# Patient Record
Sex: MALE | Race: WHITE | NOT HISPANIC OR LATINO | Employment: OTHER | URBAN - NONMETROPOLITAN AREA
[De-identification: names, ages, dates, MRNs, and addresses within clinical notes are randomized per-mention and may not be internally consistent; named-entity substitution may affect disease eponyms.]

---

## 2017-06-07 DIAGNOSIS — M54.50 LOW BACK PAIN: Primary | ICD-10-CM

## 2017-06-08 ENCOUNTER — HOSPITAL ENCOUNTER (OUTPATIENT)
Dept: MRI IMAGING | Facility: HOSPITAL | Age: 67
Discharge: HOME | End: 2017-06-08
Attending: FAMILY MEDICINE | Admitting: FAMILY MEDICINE
Payer: MEDICARE

## 2017-06-08 PROCEDURE — 72148 MRI LUMBAR SPINE W/O DYE: CPT | Mod: TC

## 2017-06-19 ENCOUNTER — TRANSFERRED RECORDS (OUTPATIENT)
Dept: HEALTH INFORMATION MANAGEMENT | Facility: HOSPITAL | Age: 67
End: 2017-06-19

## 2017-06-19 DIAGNOSIS — M51.06 INTERVERTEBRAL LUMBAR DISC DISORDER WITH MYELOPATHY, LUMBAR REGION: Primary | ICD-10-CM

## 2017-06-23 ENCOUNTER — HOSPITAL ENCOUNTER (OUTPATIENT)
Dept: INTERVENTIONAL RADIOLOGY/VASCULAR | Facility: HOSPITAL | Age: 67
Discharge: HOME | End: 2017-06-23
Attending: FAMILY MEDICINE | Admitting: FAMILY MEDICINE
Payer: MEDICARE

## 2017-06-23 PROCEDURE — 64483 NJX AA&/STRD TFRM EPI L/S 1: CPT | Mod: TC

## 2017-06-23 PROCEDURE — 25000128 H RX IP 250 OP 636: Performed by: RADIOLOGY

## 2017-06-23 RX ORDER — IOPAMIDOL 612 MG/ML
15 INJECTION, SOLUTION INTRATHECAL ONCE
Status: COMPLETED | OUTPATIENT
Start: 2017-06-23 | End: 2017-06-23

## 2017-06-23 RX ORDER — DEXAMETHASONE SODIUM PHOSPHATE 10 MG/ML
INJECTION, SOLUTION INTRAMUSCULAR; INTRAVENOUS
Status: DISPENSED
Start: 2017-06-23 | End: 2017-06-23

## 2017-06-23 RX ORDER — DEXAMETHASONE SODIUM PHOSPHATE 10 MG/ML
10 INJECTION, SOLUTION INTRAMUSCULAR; INTRAVENOUS ONCE
Status: COMPLETED | OUTPATIENT
Start: 2017-06-23 | End: 2017-06-23

## 2017-06-23 RX ADMIN — DEXAMETHASONE SODIUM PHOSPHATE 10 MG: 10 INJECTION, SOLUTION INTRAMUSCULAR; INTRAVENOUS at 08:46

## 2017-06-23 RX ADMIN — IOPAMIDOL 2 ML: 612 INJECTION, SOLUTION INTRATHECAL at 08:47

## 2017-06-23 NOTE — IP AVS SNAPSHOT
MRN:[id number]                      After Visit Summary   6/23/2017    Ramo Lockwood    MRN: [id number]           Visit Information        Provider Department      6/23/2017  8:30 AM Radiologist, Need Interventional; [facility]           Review of your medicines      UNREVIEWED medicines. Ask your doctor about these medicines        Dose / Directions    furosemide 40 MG tablet   Commonly known as:  LASIX        Dose:  40 mg   Take 1 tablet (40 mg) by mouth daily   Quantity:  10 tablet   Refills:  0       nadolol 20 MG tablet   Commonly known as:  CORGARD   Used for:  Alcoholic cirrhosis of liver (H)        Dose:  20 mg   Take 1 tablet (20 mg) by mouth daily   Quantity:  30 tablet   Refills:  1       ranitidine 150 MG tablet   Commonly known as:  ZANTAC        Dose:  150 mg   Take 150 mg by mouth   Refills:  0       spironolactone 25 MG tablet   Commonly known as:  ALDACTONE   Used for:  Alcoholic cirrhosis of liver (H), Ascites, Portal hypertension (H)        Dose:  25 mg   Take 1 tablet (25 mg) by mouth daily   Quantity:  30 tablet   Refills:  1                Protect others around you: Learn how to safely use, store and throw away your medicines at www.disposemymeds.org.         Follow-ups after your visit         Care Instructions        Further instructions from your care team       Home number on file [phone] (home)  Is it ok to leave a message at this number(s)? Yes    Dr. Macias completed your epidural steroid injection lumbar procedure on 6/23/2017.    Current Pain Level (0-10 Scale): 7/10  Post Pain Level (0-10):  3/10    Radiology Discharge instructions for Steroid Injection    Activity Level:     Do not do any heavy activity or exercise for 24 hours.   Do not drive for 4 hours after your injection.  Diet:   Return to your normal diet.  Medications:   If you have stopped taking your Aspirin, Coumadin/Warfarin, Ibuprofen, or any   other blood  "thinner for this procedure you may resume in the morning unless   your primary care provider has given you other instructions.    Diabetics may see an increase in blood sugar after steroid injections. If you are concerned about your blood sugar, please contact your family doctor.    Site Care:  Remove the bandage and bathe or shower the morning after the procedure.      Please allow two weeks to experience improvement in your pain.  If you have any further issues, please contact your provider.    Call your Primary Care Provider if you have the following (if your primary care provider is not available please seek emergency care):   Nausea with vomiting   Severe headache   Drowsiness or confusion   Redness or drainage at the injection or puncture site   Temperature over 101 degrees F   Other concerns   Worsening back pain   Stiff neck       Additional Information About Your Visit        [facility] Information     [employer] lets you send messages to your doctor, view your test results, renew your prescriptions, schedule appointments and more. To sign up, go to [email or web address] . Click on \"Log in\" on the left side of the screen, which will take you to the Welcome page. Then click on \"Sign up Now\" on the right side of the page.     You will be asked to enter the access code listed below, as well as some personal information. Please follow the directions to create your username and password.     Your access code is: [email or web address]  Expires: 2017  8:51 AM     Your access code will  in 90 days. If you need help or a new code, please call your [facility] clinic or [phone].        Care EveryWhere ID     This is your Care EveryWhere ID. This could be used by other organizations to access your [facility] medical records  [id number]         Primary Care Provider Office Phone # Fax #    Aníbal Dave [phone]      Equal Access to Services     RYAN SCHMID AH: Madi Garcia, " wadesiraeda harpal, qaybta kaharris hodges, chuy nuhain hayaan evgenyhaydee sajigrayson laNadineaachristiano ah. So [facility] [phone].    ATENCIÓN: Si geraldo corado, tiene a leger disposición servicios gratuitos de asistencia lingüística. Zenia al [phone].    We comply with applicable federal civil rights laws and [place] laws. We do not discriminate on the basis of race, color, national origin, age, disability sex, sexual orientation or gender identity.            Thank you!     Thank you for choosing [facility] for your care. Our goal is always to provide you with excellent care. Hearing back from our patients is one way we can continue to improve our services. Please take a few minutes to complete the written survey that you may receive in the mail after you visit with us. Thank you!             Medication List: This is a list of all your medications and when to take them. Check marks below indicate your daily home schedule. Keep this list as a reference.      Medications           Morning Afternoon Evening Bedtime As Needed    furosemide 40 MG tablet   Commonly known as:  LASIX   Take 1 tablet (40 mg) by mouth daily                                nadolol 20 MG tablet   Commonly known as:  CORGARD   Take 1 tablet (20 mg) by mouth daily                                ranitidine 150 MG tablet   Commonly known as:  ZANTAC   Take 150 mg by mouth                                spironolactone 25 MG tablet   Commonly known as:  ALDACTONE   Take 1 tablet (25 mg) by mouth daily

## 2017-06-23 NOTE — IP AVS SNAPSHOT
[facility]    [address] [place] [address]    Phone:  [phone]    Fax:  [phone]                                       After Visit Summary   6/23/2017    Ramo Lockwood    MRN: [id number]           After Visit Summary Signature Page     I have received my discharge instructions, and my questions have been answered. I have discussed any challenges I see with this plan with the nurse or doctor.    ..........................................................................................................................................  Patient/Patient Representative Signature      ..........................................................................................................................................  Patient Representative Print Name and Relationship to Patient    ..................................................               ................................................  Date                                            Time    ..........................................................................................................................................  Reviewed by Signature/Title    ...................................................              ..............................................  Date                                                            Time

## 2017-08-04 ENCOUNTER — TRANSFERRED RECORDS (OUTPATIENT)
Dept: MULTI SPECIALTY CLINIC | Facility: CLINIC | Age: 67
End: 2017-08-04

## 2017-08-04 LAB — COLONOSCOPY: NORMAL

## 2017-09-01 ENCOUNTER — HOSPITAL ENCOUNTER (EMERGENCY)
Facility: HOSPITAL | Age: 67
Discharge: HOME | End: 2017-09-01
Attending: NURSE PRACTITIONER | Admitting: NURSE PRACTITIONER
Payer: MEDICARE

## 2017-09-01 VITALS
TEMPERATURE: 96.9 F | DIASTOLIC BLOOD PRESSURE: 79 MMHG | RESPIRATION RATE: 16 BRPM | SYSTOLIC BLOOD PRESSURE: 120 MMHG | HEART RATE: 68 BPM | OXYGEN SATURATION: 97 %

## 2017-09-01 DIAGNOSIS — S61.216A LACERATION OF RIGHT LITTLE FINGER WITHOUT FOREIGN BODY WITHOUT DAMAGE TO NAIL, INITIAL ENCOUNTER: ICD-10-CM

## 2017-09-01 PROCEDURE — 40000268 ZZH STATISTIC NO CHARGES

## 2017-09-01 PROCEDURE — 12001 RPR S/N/AX/GEN/TRNK 2.5CM/<: CPT

## 2017-09-01 PROCEDURE — 12001 RPR S/N/AX/GEN/TRNK 2.5CM/<: CPT | Performed by: NURSE PRACTITIONER

## 2017-09-01 ASSESSMENT — ENCOUNTER SYMPTOMS
CHILLS: 0
WOUND: 1
FATIGUE: 0
APPETITE CHANGE: 0
ARTHRALGIAS: 0
FEVER: 0

## 2017-09-01 NOTE — ED AVS SNAPSHOT
[facility]    [address] [place] [address]    Phone:  [phone]                                       Ramo Lockwood   MRN: [id number]    Department:  [facility]   Date of Visit:  9/1/2017           After Visit Summary Signature Page     I have received my discharge instructions, and my questions have been answered. I have discussed any challenges I see with this plan with the nurse or doctor.    ..........................................................................................................................................  Patient/Patient Representative Signature      ..........................................................................................................................................  Patient Representative Print Name and Relationship to Patient    ..................................................               ................................................  Date                                            Time    ..........................................................................................................................................  Reviewed by Signature/Title    ...................................................              ..............................................  Date                                                            Time

## 2017-09-01 NOTE — ED NOTES
Pt presents today alone for c/o a laceration to his right pinky finger in some brush/grass, incident happened just prior to arrival.

## 2017-09-01 NOTE — ED PROVIDER NOTES
History     Chief Complaint   Patient presents with     Laceration     right anterior distal pinky finger cut on brush     HPI  Ramo Lockwood is a 67 year old male who presents today with a CC of right little finger laceration over the palmar surface of the distal phalanx.  The laceration happened about 1 hour PTA.  He was cutting brush and caught it on some brush in the woods.  Pain is minimal.  The bleeding is stopped.  He has full ROM of joints in the finger.  He denies any other injuries.  Tetanus is up to date as of 10/2012.      I have reviewed the Medications, Allergies, Past Medical and Surgical History, and Social History in the Epic system.    Allergies:   Allergies   Allergen Reactions     Codeine Camsylate          No current facility-administered medications on file prior to encounter.   Current Outpatient Prescriptions on File Prior to Encounter:  nadolol (CORGARD) 20 MG tablet Take 1 tablet (20 mg) by mouth daily   ranitidine (ZANTAC) 150 MG tablet Take 150 mg by mouth       Patient Active Problem List   Diagnosis     Epistaxis     Acute maxillary sinusitis     Nondependent alcohol abuse, continuous drinking behavior     Degeneration of lumbar or lumbosacral intervertebral disc     Type II diabetes mellitus (H)     Gout     Hyperlipidemia     Neoplasm of uncertain behavior of lip, oral cavity, and pharynx     Anemia     Essential hypertension     GI bleed     Alcoholic cirrhosis of liver (H)     Ascites     Chronic hyponatremia     Thrombocytopenia (H)     Elevated INR     Epileptic petit mal status (H)     Diarrhea     Seizure (H)     C. difficile colitis     Hyponatremia       Past Surgical History:   Procedure Laterality Date     COLONOSCOPY       PHACOEMULSIFICATION WITH STANDARD INTRAOCULAR LENS IMPLANT  6/10/2013    Procedure: PHACOEMULSIFICATION WITH STANDARD INTRAOCULAR LENS IMPLANT;  CATARACT EXTRACTION WITH INTRA OCLULAR LENS LEFT;  Surgeon: Benoit Philip MD;  Location: [facility] OR      "PHACOEMULSIFICATION WITH STANDARD INTRAOCULAR LENS IMPLANT  7/23/2013    Procedure: PHACOEMULSIFICATION WITH STANDARD INTRAOCULAR LENS IMPLANT;  CATARACT EXTRACTION WITH INTRA OCULAR LENS RIGHT;  Surgeon: Benoit Philip MD;  Location: [facility]       Social History   Substance Use Topics     Smoking status: Never Smoker     Smokeless tobacco: Never Used     Alcohol use No      Comment: H/O abuse and treatment, sober for 2 yrs.        Most Recent Immunizations   Administered Date(s) Administered     Influenza (IIV3) 12/12/2012     TD (ADULT, 7+) 08/22/2000     TDAP Vaccine (Boostrix) 10/16/2012       BMI: Estimated body mass index is 25.37 kg/(m^2) as calculated from the following:    Height as of 6/5/15: 1.778 m (5' 10\").    Weight as of 6/7/15: 80.2 kg (176 lb 12.9 oz).      Review of Systems   Constitutional: Negative for appetite change, chills, fatigue and fever.   Musculoskeletal: Negative for arthralgias.   Skin: Positive for wound.       Physical Exam   BP: 120/79  Pulse: 68  Temp: 96.9  F (36.1  C)  Resp: 16  SpO2: 97 %    Physical Exam   Constitutional: He is oriented to person, place, and time. He appears well-developed and well-nourished. He is cooperative. He does not appear ill. No distress.   Cardiovascular: Normal rate.    Pulmonary/Chest: Effort normal.   Musculoskeletal:        Right hand: He exhibits laceration (palmar surface, distal phalanx, little finger 1.5 cm, into subcutaneous tissue). He exhibits normal range of motion and no bony tenderness. Normal sensation noted. Normal strength noted.   Neurological: He is alert and oriented to person, place, and time.   Psychiatric: He has a normal mood and affect. His behavior is normal.   Nursing note and vitals reviewed.      ED Course     ED Course     Laceration repair  Date/Time: 9/2/2017 12:29 PM  Performed by: TRUE SUMMERS  Authorized by: TRUE SUMMERS   Consent: Verbal consent obtained. Written consent not obtained.  Risks and " "benefits: risks, benefits and alternatives were discussed  Consent given by: patient  Patient understanding: patient states understanding of the procedure being performed  Body area: upper extremity  Location details: right small finger  Laceration length: 1.5 cm  Foreign bodies: no foreign bodies  Tendon involvement: none  Anesthesia: local infiltration    Anesthesia:  Local Anesthetic: lidocaine 2% without epinephrine  Anesthetic total: 1.5 mL    Sedation:  Patient sedated: no  Preparation: Patient was prepped and draped in the usual sterile fashion.  Irrigation solution: saline  Irrigation method: syringe  Amount of cleaning: standard  Debridement: minimal  Degree of undermining: none  Skin closure: 5-0 nylon  Number of sutures: 7  Technique: simple  Approximation: close  Approximation difficulty: simple  Dressing: tube gauze  Patient tolerance: Patient tolerated the procedure well with no immediate complications        Assessments & Plan (with Medical Decision Making)     I have reviewed the nursing notes.    I have reviewed the findings, diagnosis, plan and need for follow up with the patient.  ASSESSMENT / PLAN:  (S61.216A) Laceration of right little finger without foreign body without damage to nail, initial encounter  Comment: closed with 7 sutures  Plan:  Keep clean and dry x 24 hours, then may shower as usual. Do not soak or swim   Take Tylenol per package directions for pain   Call tomorrow to make a follow up appointment for suture removal in 7 days   Watch for signs and symptoms of infection: Increasing redness, pain, warmth, fever, chills, malodor, increased drainage, and follow up here or with PCP if any arise   Patient verbally educated and discharge instructions given, verbalizes understanding, and all questions answered to the best of my ability.   Return to ED/UC if symptoms increase or concerns develop such as those discussed and listed on the \"When to go the Emergency Room\" portion of your " discharge instructions.       Discharge Medication List as of 9/1/2017 12:14 PM          Final diagnoses:   Laceration of right little finger without foreign body without damage to nail, initial encounter - closed with 7 sutures       9/1/2017   [facility]     Holli Wan NP  09/02/17 1238

## 2017-09-01 NOTE — ED AVS SNAPSHOT
[facility]    [address] [address]    Phone:  [phone]                                       Ramo Lockwood   MRN: [id number]    Department:  [facility]   Date of Visit:  9/1/2017           Patient Information     Date Of Birth          1950        Your diagnoses for this visit were:     Laceration of right little finger without foreign body without damage to nail, initial encounter closed with 7 sutures       You were seen by Holli Wan NP.      Follow-up Information     Follow up with [facility].    Specialty:  EMERGENCY MEDICINE    Why:  As needed, If symptoms worsen, or concerns develop    Contact information:    [address] [address] [phone]    Additional information:    From [facility]: Take [address]. Turn left at [address]. Turn left at the first stoplight on [address]. At the first stop sign, take a right onto [address]. Take a left into the parking lot and continue through until you reach the North enterance of the building.       From [place]: Take [address]. Take the [address] ramp towards [place]. Turn left onto [address]. Take a slight right onto [address]. Turn left at the first stoplight on [address]. At the first stop sign, take a right onto [address]. Take a left into the parking lot and continue through until you reach the North enterance of the building.       From [place]: Take [address]. Take a right at [address]. At the first stop sign, take a right onto [address]. Take a left into the parking lot and continue through until you reach the North enterance of the building.         Follow up with Aníbal Dave MD In 1 week.    Specialty:  Family Practice    Why:  For suture removal    Contact information:    [facility]  [address] [facility] [address]  [phone]           Discharge Instructions         Extremity Laceration: Sutures, Staples, or Tape  A laceration is a cut through the skin. If it is deep, it may require stitches (sutures) or staples to close so it can heal. Minor cuts may be treated with surgical tape closures.   X-rays may be done if something may have entered the skin through the cut. You may also need a tetanus shot if you are not up to date on this vaccination.  Home care    Follow the health care provider s instructions on how to care for the cut.    Wash your hands with soap and warm water before and after caring for your wound. This is to help prevent infection.    Keep the wound clean and dry. If a bandage was applied and it becomes wet or dirty, replace it. Otherwise, leave it in place for the first 24 hours, then change it once a day or as directed.    If sutures or staples were used, clean the wound daily:    After removing the bandage, wash the area with soap and water. Use a wet cotton swab to loosen and remove any blood or crust that forms.    After cleaning, keep the wound clean and dry. Talk with your doctor before applying any antibiotic ointment to the wound. Reapply the bandage.    You may remove the bandage to shower as usual after the first 24 hours, but do not soak the area in water (no swimming) until the stitches or staples are removed.    If surgical tape closures were used, keep the area clean and dry. If it becomes wet, blot it dry with a towel.    The doctor may prescribe an antibiotic cream or ointment to prevent infection. Do not stop taking this medication until you have finished the prescribed course or the doctor tells you to stop. The doctor may also prescribe medications for pain. Follow the doctor s instructions for taking these medications.    Avoid activities that may reopen your wound.  Follow-up care  Follow up with your health care provider. Most skin wounds heal within ten days. However, an infection may sometimes occur  despite proper treatment. Therefore, check the wound daily for the signs of infection listed below. Stitches and staples should be removed within 7-14 days. If surgical tape closures were used, you may remove them after 10 days if they have not fallen off by then.   When to seek medical advice  Call your health care provider right away if any of these occur:    Wound bleeding not controlled by direct pressure    Signs of infection, including increasing pain in the wound, increasing wound redness or swelling, or pus or bad odor coming from the wound    Fever of 100.4 F (38 C) or higher or as directed by your healthcare provider    Stitches or staples come apart or fall out or surgical tape falls off before 7 days    Wound edges re-open    Wound changes colors    Numbness around the wound     Decreased movement around the injured area  Date Last Reviewed: 6/14/2015 2000-2017 [employer]. [address] [place] [address]. All rights reserved. This information is not intended as a substitute for professional medical care. Always follow your healthcare professional's instructions.             Review of your medicines      Our records show that you are taking the medicines listed below. If these are incorrect, please call your family doctor or clinic.        Dose / Directions Last dose taken    nadolol 20 MG tablet   Commonly known as:  CORGARD   Dose:  20 mg   Quantity:  30 tablet        Take 1 tablet (20 mg) by mouth daily   Refills:  1        ranitidine 150 MG tablet   Commonly known as:  ZANTAC   Dose:  150 mg        Take 150 mg by mouth   Refills:  0                Orders Needing Specimen Collection     None      Pending Results     No orders found from 8/30/2017 to 9/2/2017.            Pending Culture Results     No orders found from 8/30/2017 to 9/2/2017.            Thank you for choosing [facility]       Thank you for choosing [facility] for your care. Our goal is always to provide you with  "excellent care. Hearing back from our patients is one way we can continue to improve our services. Please take a few minutes to complete the written survey that you may receive in the mail after you visit with us. Thank you!        [employer] Information     [employer] lets you send messages to your doctor, view your test results, renew your prescriptions, schedule appointments and more. To sign up, go to [email or web address] . Click on \"Log in\" on the left side of the screen, which will take you to the Welcome page. Then click on \"Sign up Now\" on the right side of the page.     You will be asked to enter the access code listed below, as well as some personal information. Please follow the directions to create your username and password.     Your access code is: [email or web address]  Expires: 2017  8:51 AM     Your access code will  in 90 days. If you need help or a new code, please call your [facility] clinic or [phone].        Care EveryWhere ID     This is your Care EveryWhere ID. This could be used by other organizations to access your [facility] medical records  [id number]        Equal Access to Services     ALFONSO SCHMID : Hadii pete Garcia, wachun rowan, qamaite kaalmawoody hodges, chuy cox . So [facility] [phone].    ATENCIÓN: Si habla español, tiene a leger disposición servicios gratuitos de asistencia lingüística. Llame al [phone].    We comply with applicable federal civil rights laws and [place] laws. We do not discriminate on the basis of race, color, national origin, age, disability sex, sexual orientation or gender identity.            After Visit Summary       This is your record. Keep this with you and show to your community pharmacist(s) and doctor(s) at your next visit.                  "

## 2017-09-01 NOTE — DISCHARGE INSTRUCTIONS
Extremity Laceration: Sutures, Staples, or Tape  A laceration is a cut through the skin. If it is deep, it may require stitches (sutures) or staples to close so it can heal. Minor cuts may be treated with surgical tape closures.   X-rays may be done if something may have entered the skin through the cut. You may also need a tetanus shot if you are not up to date on this vaccination.  Home care    Follow the health care provider s instructions on how to care for the cut.    Wash your hands with soap and warm water before and after caring for your wound. This is to help prevent infection.    Keep the wound clean and dry. If a bandage was applied and it becomes wet or dirty, replace it. Otherwise, leave it in place for the first 24 hours, then change it once a day or as directed.    If sutures or staples were used, clean the wound daily:    After removing the bandage, wash the area with soap and water. Use a wet cotton swab to loosen and remove any blood or crust that forms.    After cleaning, keep the wound clean and dry. Talk with your doctor before applying any antibiotic ointment to the wound. Reapply the bandage.    You may remove the bandage to shower as usual after the first 24 hours, but do not soak the area in water (no swimming) until the stitches or staples are removed.    If surgical tape closures were used, keep the area clean and dry. If it becomes wet, blot it dry with a towel.    The doctor may prescribe an antibiotic cream or ointment to prevent infection. Do not stop taking this medication until you have finished the prescribed course or the doctor tells you to stop. The doctor may also prescribe medications for pain. Follow the doctor s instructions for taking these medications.    Avoid activities that may reopen your wound.  Follow-up care  Follow up with your health care provider. Most skin wounds heal within ten days. However, an infection may sometimes occur despite proper treatment.  Therefore, check the wound daily for the signs of infection listed below. Stitches and staples should be removed within 7-14 days. If surgical tape closures were used, you may remove them after 10 days if they have not fallen off by then.   When to seek medical advice  Call your health care provider right away if any of these occur:    Wound bleeding not controlled by direct pressure    Signs of infection, including increasing pain in the wound, increasing wound redness or swelling, or pus or bad odor coming from the wound    Fever of 100.4 F (38 C) or higher or as directed by your healthcare provider    Stitches or staples come apart or fall out or surgical tape falls off before 7 days    Wound edges re-open    Wound changes colors    Numbness around the wound     Decreased movement around the injured area  Date Last Reviewed: 6/14/2015 2000-2017 [employer]. [address] [place] [id number]. All rights reserved. This information is not intended as a substitute for professional medical care. Always follow your healthcare professional's instructions.

## 2018-01-15 ENCOUNTER — HOSPITAL ENCOUNTER (OUTPATIENT)
Facility: HOSPITAL | Age: 68
Setting detail: OBSERVATION
Discharge: HOME | End: 2018-01-16
Attending: PHYSICIAN ASSISTANT | Admitting: INTERNAL MEDICINE
Payer: MEDICARE

## 2018-01-15 ENCOUNTER — APPOINTMENT (OUTPATIENT)
Dept: GENERAL RADIOLOGY | Facility: HOSPITAL | Age: 68
End: 2018-01-15
Attending: PHYSICIAN ASSISTANT
Payer: MEDICARE

## 2018-01-15 DIAGNOSIS — K70.31 ALCOHOLIC CIRRHOSIS OF LIVER WITH ASCITES (H): Primary | Chronic | ICD-10-CM

## 2018-01-15 DIAGNOSIS — J10.1 INFLUENZA A: ICD-10-CM

## 2018-01-15 DIAGNOSIS — I10 ESSENTIAL HYPERTENSION: Chronic | ICD-10-CM

## 2018-01-15 DIAGNOSIS — N17.9 ACUTE KIDNEY INJURY SUPERIMPOSED ON CHRONIC KIDNEY DISEASE (H): ICD-10-CM

## 2018-01-15 DIAGNOSIS — N18.9 ACUTE KIDNEY INJURY SUPERIMPOSED ON CHRONIC KIDNEY DISEASE (H): ICD-10-CM

## 2018-01-15 DIAGNOSIS — M62.81 GENERALIZED MUSCLE WEAKNESS: ICD-10-CM

## 2018-01-15 LAB
ALBUMIN SERPL-MCNC: 2.4 G/DL (ref 3.4–5)
ALP SERPL-CCNC: 189 U/L (ref 40–150)
ALT SERPL W P-5'-P-CCNC: 23 U/L (ref 0–70)
ANION GAP SERPL CALCULATED.3IONS-SCNC: 10 MMOL/L (ref 3–14)
AST SERPL W P-5'-P-CCNC: 49 U/L (ref 0–45)
BASOPHILS # BLD AUTO: 0 10E9/L (ref 0–0.2)
BASOPHILS NFR BLD AUTO: 0 %
BILIRUB SERPL-MCNC: 2.6 MG/DL (ref 0.2–1.3)
BUN SERPL-MCNC: 13 MG/DL (ref 7–30)
CALCIUM SERPL-MCNC: 7.8 MG/DL (ref 8.5–10.1)
CHLORIDE SERPL-SCNC: 104 MMOL/L (ref 94–109)
CO2 SERPL-SCNC: 22 MMOL/L (ref 20–32)
CREAT SERPL-MCNC: 1.44 MG/DL (ref 0.66–1.25)
DIFFERENTIAL METHOD BLD: ABNORMAL
EOSINOPHIL # BLD AUTO: 0.2 10E9/L (ref 0–0.7)
EOSINOPHIL NFR BLD AUTO: 2 %
ERYTHROCYTE [DISTWIDTH] IN BLOOD BY AUTOMATED COUNT: 15.8 % (ref 10–15)
GFR SERPL CREATININE-BSD FRML MDRD: 49 ML/MIN/1.7M2
GLUCOSE SERPL-MCNC: 106 MG/DL (ref 70–99)
HCT VFR BLD AUTO: 36.1 % (ref 40–53)
HGB BLD-MCNC: 12.5 G/DL (ref 13.3–17.7)
LACTATE SERPL-SCNC: 2 MMOL/L (ref 0.4–2)
LYMPHOCYTES # BLD AUTO: 0.1 10E9/L (ref 0.8–5.3)
LYMPHOCYTES NFR BLD AUTO: 1 %
MCH RBC QN AUTO: 32.1 PG (ref 26.5–33)
MCHC RBC AUTO-ENTMCNC: 34.6 G/DL (ref 31.5–36.5)
MCV RBC AUTO: 93 FL (ref 78–100)
MONOCYTES # BLD AUTO: 0.7 10E9/L (ref 0–1.3)
MONOCYTES NFR BLD AUTO: 6 %
NEUTROPHILS # BLD AUTO: 10.9 10E9/L (ref 1.6–8.3)
NEUTROPHILS NFR BLD AUTO: 91 %
PLATELET # BLD AUTO: 57 10E9/L (ref 150–450)
POTASSIUM SERPL-SCNC: 4 MMOL/L (ref 3.4–5.3)
PROT SERPL-MCNC: 6.9 G/DL (ref 6.8–8.8)
RBC # BLD AUTO: 3.89 10E12/L (ref 4.4–5.9)
SODIUM SERPL-SCNC: 136 MMOL/L (ref 133–144)
WBC # BLD AUTO: 12 10E9/L (ref 4–11)

## 2018-01-15 PROCEDURE — G0378 HOSPITAL OBSERVATION PER HR: HCPCS

## 2018-01-15 PROCEDURE — 87040 BLOOD CULTURE FOR BACTERIA: CPT | Mod: 91 | Performed by: PHYSICIAN ASSISTANT

## 2018-01-15 PROCEDURE — 80053 COMPREHEN METABOLIC PANEL: CPT | Performed by: PHYSICIAN ASSISTANT

## 2018-01-15 PROCEDURE — 71046 X-RAY EXAM CHEST 2 VIEWS: CPT | Mod: TC

## 2018-01-15 PROCEDURE — 36415 COLL VENOUS BLD VENIPUNCTURE: CPT | Performed by: PHYSICIAN ASSISTANT

## 2018-01-15 PROCEDURE — A9270 NON-COVERED ITEM OR SERVICE: HCPCS | Mod: GY | Performed by: INTERNAL MEDICINE

## 2018-01-15 PROCEDURE — 25000132 ZZH RX MED GY IP 250 OP 250 PS 637: Mod: GY | Performed by: PHYSICIAN ASSISTANT

## 2018-01-15 PROCEDURE — 25000125 ZZHC RX 250: Performed by: PHYSICIAN ASSISTANT

## 2018-01-15 PROCEDURE — 99284 EMERGENCY DEPT VISIT MOD MDM: CPT | Performed by: PHYSICIAN ASSISTANT

## 2018-01-15 PROCEDURE — 94640 AIRWAY INHALATION TREATMENT: CPT

## 2018-01-15 PROCEDURE — 99285 EMERGENCY DEPT VISIT HI MDM: CPT | Mod: 25

## 2018-01-15 PROCEDURE — 99219 ZZC INITIAL OBSERVATION CARE,LEVL II: CPT | Performed by: INTERNAL MEDICINE

## 2018-01-15 PROCEDURE — 85025 COMPLETE CBC W/AUTO DIFF WBC: CPT | Performed by: PHYSICIAN ASSISTANT

## 2018-01-15 PROCEDURE — 96360 HYDRATION IV INFUSION INIT: CPT

## 2018-01-15 PROCEDURE — 25000128 H RX IP 250 OP 636: Performed by: PHYSICIAN ASSISTANT

## 2018-01-15 PROCEDURE — 83605 ASSAY OF LACTIC ACID: CPT | Performed by: PHYSICIAN ASSISTANT

## 2018-01-15 PROCEDURE — 25000132 ZZH RX MED GY IP 250 OP 250 PS 637: Mod: GY | Performed by: INTERNAL MEDICINE

## 2018-01-15 PROCEDURE — 40000786 ZZHCL STATISTIC ACTIVE MRSA SURVEILLANCE CULTURE: Performed by: INTERNAL MEDICINE

## 2018-01-15 PROCEDURE — 96361 HYDRATE IV INFUSION ADD-ON: CPT

## 2018-01-15 PROCEDURE — A9270 NON-COVERED ITEM OR SERVICE: HCPCS | Mod: GY | Performed by: PHYSICIAN ASSISTANT

## 2018-01-15 RX ORDER — NALOXONE HYDROCHLORIDE 0.4 MG/ML
.1-.4 INJECTION, SOLUTION INTRAMUSCULAR; INTRAVENOUS; SUBCUTANEOUS
Status: DISCONTINUED | OUTPATIENT
Start: 2018-01-15 | End: 2018-01-16 | Stop reason: HOSPADM

## 2018-01-15 RX ORDER — SODIUM CHLORIDE 9 MG/ML
1000 INJECTION, SOLUTION INTRAVENOUS CONTINUOUS
Status: DISCONTINUED | OUTPATIENT
Start: 2018-01-15 | End: 2018-01-15

## 2018-01-15 RX ORDER — ACETAMINOPHEN 650 MG/1
650 SUPPOSITORY RECTAL EVERY 4 HOURS PRN
Status: DISCONTINUED | OUTPATIENT
Start: 2018-01-15 | End: 2018-01-16 | Stop reason: HOSPADM

## 2018-01-15 RX ORDER — PROCHLORPERAZINE MALEATE 5 MG
5 TABLET ORAL EVERY 6 HOURS PRN
Status: DISCONTINUED | OUTPATIENT
Start: 2018-01-15 | End: 2018-01-16 | Stop reason: HOSPADM

## 2018-01-15 RX ORDER — IPRATROPIUM BROMIDE AND ALBUTEROL SULFATE 2.5; .5 MG/3ML; MG/3ML
3 SOLUTION RESPIRATORY (INHALATION) ONCE
Status: COMPLETED | OUTPATIENT
Start: 2018-01-15 | End: 2018-01-15

## 2018-01-15 RX ORDER — SODIUM CHLORIDE 9 MG/ML
1000 INJECTION, SOLUTION INTRAVENOUS CONTINUOUS
Status: DISCONTINUED | OUTPATIENT
Start: 2018-01-15 | End: 2018-01-16 | Stop reason: HOSPADM

## 2018-01-15 RX ORDER — NADOLOL 20 MG/1
20 TABLET ORAL EVERY EVENING
Status: DISCONTINUED | OUTPATIENT
Start: 2018-01-15 | End: 2018-01-16 | Stop reason: HOSPADM

## 2018-01-15 RX ORDER — ONDANSETRON 2 MG/ML
4 INJECTION INTRAMUSCULAR; INTRAVENOUS EVERY 6 HOURS PRN
Status: DISCONTINUED | OUTPATIENT
Start: 2018-01-15 | End: 2018-01-16 | Stop reason: HOSPADM

## 2018-01-15 RX ORDER — BENZONATATE 100 MG/1
200 CAPSULE ORAL 3 TIMES DAILY PRN
Status: DISCONTINUED | OUTPATIENT
Start: 2018-01-15 | End: 2018-01-15

## 2018-01-15 RX ORDER — DIPHENHYDRAMINE HCL 25 MG
25 CAPSULE ORAL EVERY 6 HOURS PRN
Status: DISCONTINUED | OUTPATIENT
Start: 2018-01-15 | End: 2018-01-16 | Stop reason: HOSPADM

## 2018-01-15 RX ORDER — OSELTAMIVIR PHOSPHATE 75 MG/1
75 CAPSULE ORAL 2 TIMES DAILY
Status: DISCONTINUED | OUTPATIENT
Start: 2018-01-15 | End: 2018-01-16 | Stop reason: HOSPADM

## 2018-01-15 RX ORDER — LEVOFLOXACIN 500 MG/1
500 TABLET, FILM COATED ORAL DAILY
Status: DISCONTINUED | OUTPATIENT
Start: 2018-01-16 | End: 2018-01-16 | Stop reason: HOSPADM

## 2018-01-15 RX ORDER — BENZONATATE 100 MG/1
200 CAPSULE ORAL EVERY 4 HOURS PRN
Status: DISCONTINUED | OUTPATIENT
Start: 2018-01-15 | End: 2018-01-15

## 2018-01-15 RX ORDER — ONDANSETRON 4 MG/1
4 TABLET, ORALLY DISINTEGRATING ORAL EVERY 6 HOURS PRN
Status: DISCONTINUED | OUTPATIENT
Start: 2018-01-15 | End: 2018-01-16 | Stop reason: HOSPADM

## 2018-01-15 RX ORDER — ACETAMINOPHEN 325 MG/1
650 TABLET ORAL EVERY 4 HOURS PRN
Status: DISCONTINUED | OUTPATIENT
Start: 2018-01-15 | End: 2018-01-16 | Stop reason: HOSPADM

## 2018-01-15 RX ORDER — GUAIFENESIN/DEXTROMETHORPHAN 100-10MG/5
5 SYRUP ORAL EVERY 4 HOURS PRN
Status: DISCONTINUED | OUTPATIENT
Start: 2018-01-15 | End: 2018-01-16 | Stop reason: HOSPADM

## 2018-01-15 RX ORDER — LIDOCAINE 40 MG/G
CREAM TOPICAL
Status: DISCONTINUED | OUTPATIENT
Start: 2018-01-15 | End: 2018-01-16 | Stop reason: HOSPADM

## 2018-01-15 RX ORDER — PROCHLORPERAZINE 25 MG
12.5 SUPPOSITORY, RECTAL RECTAL EVERY 12 HOURS PRN
Status: DISCONTINUED | OUTPATIENT
Start: 2018-01-15 | End: 2018-01-16 | Stop reason: HOSPADM

## 2018-01-15 RX ORDER — LEVOFLOXACIN 500 MG/1
500 TABLET, FILM COATED ORAL ONCE
Status: COMPLETED | OUTPATIENT
Start: 2018-01-15 | End: 2018-01-15

## 2018-01-15 RX ADMIN — LEVOFLOXACIN 500 MG: 500 TABLET, FILM COATED ORAL at 18:52

## 2018-01-15 RX ADMIN — SODIUM CHLORIDE 1000 ML: 9 INJECTION, SOLUTION INTRAVENOUS at 18:55

## 2018-01-15 RX ADMIN — ACETAMINOPHEN 650 MG: 325 TABLET ORAL at 21:24

## 2018-01-15 RX ADMIN — IPRATROPIUM BROMIDE AND ALBUTEROL SULFATE 3 ML: .5; 3 SOLUTION RESPIRATORY (INHALATION) at 16:55

## 2018-01-15 RX ADMIN — SODIUM CHLORIDE 1000 ML: 9 INJECTION, SOLUTION INTRAVENOUS at 17:20

## 2018-01-15 RX ADMIN — NADOLOL 20 MG: 20 TABLET ORAL at 21:28

## 2018-01-15 RX ADMIN — BENZONATATE 200 MG: 100 CAPSULE, LIQUID FILLED ORAL at 17:38

## 2018-01-15 RX ADMIN — GUAIFENESIN AND DEXTROMETHORPHAN 5 ML: 100; 10 SYRUP ORAL at 21:24

## 2018-01-15 RX ADMIN — OSELTAMIVIR PHOSPHATE 75 MG: 75 CAPSULE ORAL at 21:24

## 2018-01-15 RX ADMIN — DIPHENHYDRAMINE HYDROCHLORIDE 25 MG: 25 CAPSULE ORAL at 21:24

## 2018-01-15 ASSESSMENT — ACTIVITIES OF DAILY LIVING (ADL)
RETIRED_COMMUNICATION: 0-->UNDERSTANDS/COMMUNICATES WITHOUT DIFFICULTY
DRESS: 0 - INDEPENDENT
SWALLOWING: 0 - SWALLOWS FOODS/LIQUIDS WITHOUT DIFFICULTY
TOILETING: 0 - INDEPENDENT
FALL_HISTORY_WITHIN_LAST_SIX_MONTHS: NO
EATING: 0 - INDEPENDENT
COGNITION: 0 - NO COGNITION ISSUES REPORTED
TRANSFERRING: 0 - INDEPENDENT
TRANSFERRING: 0-->INDEPENDENT
AMBULATION: 0 - INDEPENDENT
DRESS: 0-->INDEPENDENT
AMBULATION: 0-->INDEPENDENT
CHANGE_IN_FUNCTIONAL_STATUS_SINCE_ONSET_OF_CURRENT_ILLNESS/INJURY: NO
RETIRED_EATING: 0-->INDEPENDENT
TOILETING: 0-->INDEPENDENT
COMMUNICATION: 0 - UNDERSTANDS/COMMUNICATES WITHOUT DIFFICULTY
SWALLOWING: 0-->SWALLOWS FOODS/LIQUIDS WITHOUT DIFFICULTY
BATHING: 0-->INDEPENDENT
BATHING: 0 - INDEPENDENT

## 2018-01-15 ASSESSMENT — ENCOUNTER SYMPTOMS
NAUSEA: 1
FATIGUE: 1
WEAKNESS: 1
WHEEZING: 1
ABDOMINAL PAIN: 0
APPETITE CHANGE: 1
VOMITING: 0
FEVER: 1
COUGH: 1
SORE THROAT: 1
MYALGIAS: 1
DIZZINESS: 1
ACTIVITY CHANGE: 1
CHEST TIGHTNESS: 1
CHILLS: 1
SHORTNESS OF BREATH: 1
LIGHT-HEADEDNESS: 1

## 2018-01-15 NOTE — ED PROVIDER NOTES
History     Chief Complaint   Patient presents with     Chest Wall Pain     Patient diagnosed with influenza and started on Tamiflu 3 days ago. He reports increased chest wall pain     Otalgia     Bilateral since yesterday     Pharyngitis     on going     The history is provided by the patient.     Ramo Lockwood is a 67 year old male who presented to the ED along with family member for evaluation of cough, weakness, dyspnea, and sore throat.  He was diagnosed with influenza A on 1/12 and started on Tamiflu.  Symptoms have not improved and slightly worsened over the last few days.  Has alcoholic cirrhosis.  Anterior chest wall pain with coughing.  Bilateral ear pain.  Sore throat.     Problem List:    Patient Active Problem List    Diagnosis Date Noted     Hyponatremia 06/05/2015     Priority: Medium     C. 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     Priority: Medium     Diarrhea 04/19/2015     Priority: Medium     Seizure (H) 04/19/2015     Priority: Medium     Nondependent alcohol abuse, continuous drinking behavior 05/18/2014     Priority: Medium     Overview:   IMO Update 10/11       GI bleed 05/18/2014     Priority: Medium     Alcoholic cirrhosis of liver (H) 05/18/2014     Priority: Medium     Ascites 05/18/2014     Priority: Medium     Chronic hyponatremia 05/18/2014     Priority: Medium     Thrombocytopenia (H) 05/18/2014     Priority: Medium     Elevated INR 05/18/2014     Priority: Medium     Anemia 02/17/2014     Priority: Medium     Acute maxillary sinusitis 12/26/2013     Priority: Medium     Epistaxis 12/24/2013     Priority: Medium     Hyperlipidemia 12/19/2011     Priority: Medium     Overview:   IMO Update 10/11       Type II diabetes mellitus (H) 03/23/2011     Priority: Medium     Overview:   IMO Update 10/11       Neoplasm of uncertain behavior of lip, oral cavity, 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  PT.  L-spine and T-spine x-rays. L-spine MRI 9/3/02. Lumbar degenerative disc disease. [facility]-.  IMO Update 10/11          Past Medical History:    Past Medical History:   Diagnosis Date     Acute conjunctivitis, unspecified 11/04/2000     Chronic pain      Diabetes mellitus (H)      Gout      Hyperlipidemia      Hypertension      Special screening for malignant neoplasms, colon 02/16/2009       Past Surgical History:    Past Surgical History:   Procedure Laterality Date     COLONOSCOPY       PHACOEMULSIFICATION WITH STANDARD INTRAOCULAR LENS IMPLANT  6/10/2013    Procedure: PHACOEMULSIFICATION WITH STANDARD INTRAOCULAR LENS IMPLANT;  CATARACT EXTRACTION WITH INTRA OCLULAR LENS LEFT;  Surgeon: Benoit Philip MD;  Location: [facility] OR     PHACOEMULSIFICATION WITH STANDARD INTRAOCULAR LENS IMPLANT  7/23/2013    Procedure: PHACOEMULSIFICATION WITH STANDARD INTRAOCULAR LENS IMPLANT;  CATARACT EXTRACTION WITH INTRA OCULAR LENS RIGHT;  Surgeon: Benoit Philip MD;  Location: [facility] OR       Family History:    Family History   Problem Relation Age of Onset     Cardiovascular Father      MI       Social History:  Marital Status:   [4]  Social History   Substance Use Topics     Smoking status: Never Smoker     Smokeless tobacco: Never Used     Alcohol use No      Comment: H/O abuse and treatment, sober for 2 yrs.         Medications:      Oseltamivir Phosphate (TAMIFLU PO)   nadolol (CORGARD) 20 MG tablet   ranitidine (ZANTAC) 150 MG tablet         Review of Systems   Constitutional: Positive for activity change, appetite change, chills, fatigue and fever.   HENT: Positive for congestion, ear pain and sore throat.    Respiratory: Positive for cough, chest tightness, shortness of breath and wheezing.   "  Gastrointestinal: Positive for nausea. Negative for abdominal pain and vomiting.   Genitourinary: Negative.    Musculoskeletal: Positive for myalgias.   Skin: Negative.    Neurological: Positive for dizziness, weakness and light-headedness.       Physical Exam   BP: 121/51  Pulse: 74  Temp: 98.9  F (37.2  C)  Resp: 20  Height: 177.8 cm (5' 10\")  Weight: 86.2 kg (190 lb)  SpO2: 97 %      Physical Exam   Constitutional:   Ill appearing    Eyes:   Injected conjunctiva    Cardiovascular: Normal rate and regular rhythm.    Pulmonary/Chest: Effort normal.   Scattered rhonchi and expiratory wheezes  Harsh cough    Abdominal: Soft. There is no tenderness.   Neurological: He is alert.   Skin: Skin is warm and dry.   Psychiatric: He has a normal mood and affect.   Nursing note and vitals reviewed.      ED Course     ED Course     Procedures          CXR shows a possible left retrocardiac infiltrate and fullness of the right hilum.  No significant change from previous on the right.      Critical Care time:  none     Lactate is greater than 2 due to flu, at this time there is no sign of severe sepsis or septic shock.           Labs Ordered and Resulted from Time of ED Arrival Up to the Time of Departure from the ED   CBC WITH PLATELETS DIFFERENTIAL - Abnormal; Notable for the following:        Result Value    WBC 12.0 (*)     RBC Count 3.89 (*)     Hemoglobin 12.5 (*)     Hematocrit 36.1 (*)     RDW 15.8 (*)     Platelet Count 57 (*)     Absolute Neutrophil 10.9 (*)     Absolute Lymphocytes 0.1 (*)     All other components within normal limits   COMPREHENSIVE METABOLIC PANEL - Abnormal; Notable for the following:     Glucose 106 (*)     Creatinine 1.44 (*)     GFR Estimate 49 (*)     GFR Estimate If Black 59 (*)     Calcium 7.8 (*)     Bilirubin Total 2.6 (*)     Albumin 2.4 (*)     Alkaline Phosphatase 189 (*)     AST 49 (*)     All other components within normal limits   LACTIC ACID   PERIPHERAL IV CATHETER   BLOOD CULTURE "   BLOOD CULTURE       Assessments & Plan (with Medical Decision Making)   I think he may develop an infiltrate after fluids. He certainly appears ill.  Lives alone and does not feel safe at home.  Discussed with Dr. Resendez.  We elected to obtain BC and treat with oral Levaquin.  Admit for obs.      I have reviewed the nursing notes.    I have reviewed the findings, diagnosis, plan and need for follow up with the patient.       New Prescriptions    No medications on file       Final diagnoses:   Influenza A   Acute kidney injury superimposed on chronic kidney disease (H)   Generalized muscle weakness       1/15/2018   [facility]     Hari Del Rio PA-C  01/15/18 1545

## 2018-01-15 NOTE — ED NOTES
Patient recent diagnosed with influenza. He has been on Tamiflu X 3days. Patient reports worsening cough with green sputum and new sore throat.

## 2018-01-15 NOTE — ED NOTES
Patient's family member called to assist, as he states patient is having a difficult time breathing. Patient brought to triage. 02 saturation 97%. Patient brought to ED room 7.

## 2018-01-15 NOTE — IP AVS SNAPSHOT
MRN:[id number]                      After Visit Summary   1/15/2018    Ramo Lockwood    MRN: [id number]           Thank you!     Thank you for choosing [facility] for your care. Our goal is always to provide you with excellent care. Hearing back from our patients is one way we can continue to improve our services. Please take a few minutes to complete the written survey that you may receive in the mail after you visit with us. Thank you!        Patient Information     Date Of Birth          1950        Designated Caregiver       Most Recent Value    Caregiver    Will someone help with your care after discharge? no    Name of designated caregiver brother    Phone number of caregiver see face sheet    Caregiver address see face sheet      About your hospital stay     You were admitted on:  January 15, 2018 You last received care in the:  [facility]    You were discharged on:  January 16, 2018        Reason for your hospital stay       Influenza A with possible early pneumonia                  Who to Call     For medical emergencies, please call 911.  For non-urgent questions about your medical care, please call your primary care provider or clinic, [phone]          Attending Provider     Provider Specialty    Hari Del Rio PA-C Emergency Medicine    Linda Resendez MD Internal Medicine    Aga Correa, NP Nurse Practitioner       Primary Care Provider Office Phone # Fax #    Aníbal Dave [phone]       When to contact your care team       Call your primary doctor if you have any of the following: fever, shortness of breath, chest pain, any new or worsening symptoms.                  After Care Instructions     Activity       Your activity upon discharge: activity as tolerated            Diet       Follow this diet upon discharge: Orders Placed This Encounter      Regular Diet Adult            Discharge Instructions       You received 2 doses of  "Tamiflu while your were in the hospital, so do not take 2 of the doses from your home prescription. Your last dose should be tomorrow morning to complete the course.                  Follow-up Appointments     Follow-up and recommended labs and tests        Follow up with primary care provider, Aníbal Dave, within 7 days for hospital follow- up.  No follow up labs or test are needed.                  Further instructions from your care team       Appointment to see Ana Moore next Tuesday January 23 at 10:00.    Pending Results     Date and Time Order Name Status Description    1/15/2018 2211 Active MRSA Surveillance Culture Preliminary     1/15/2018 2109 Sputum Culture Aerobic Bacterial In process     1/15/2018 1836 Blood culture Preliminary     1/15/2018 1836 Blood culture Preliminary             Statement of Approval     Ordered          01/16/18 0930  I have reviewed and agree with all the recommendations and orders detailed in this document.  EFFECTIVE NOW     Approved and electronically signed by:  Aga Correa NP             Admission Information     Date & Time Provider Department Dept. Phone    1/15/2018 Aga Correa NP [facility] [phone]      Your Vitals Were     Blood Pressure Pulse Temperature Respirations Height Weight    98/58 77 97.9  F (36.6  C) (Tympanic) 20 1.778 m (5' 10\") 89.1 kg (196 lb 6.9 oz)    Pulse Oximetry BMI (Body Mass Index)                94% 28.18 kg/m2          [employer] A[employer] Information     [employer] lets you send messages to your doctor, view your test results, renew your prescriptions, schedule appointments and more. To sign up, go to [email or web address] . Click on \"Log in\" on the left side of the screen, which will take you to the Welcome page. Then click on \"Sign up Now\" on the right side of the page.     You will be asked to enter the access code listed below, as well as some personal information. Please follow the directions to create your " username and password.     Your access code is: [email or web address]  Expires: 2018 10:06 AM     Your access code will  in 90 days. If you need help or a new code, please call your [facility] clinic or [phone].        Care EveryWhere ID     This is your Care EveryWhere ID. This could be used by other organizations to access your [facility] medical records  [id number]        Equal Access to Services     [facility] : Hadii aad ku hadasho Soomaali, waaxda luqadaha, qaybta kaalmada adeegyada, waxay idiin hayaan adehaydee lennonjjdestini cox . So [facility] [phone].    ATENCIÓN: Si habla español, tiene a leger disposición servicios gratuitos de asistencia lingüística. Llame al [phone].    We comply with applicable federal civil rights laws and [place] laws. We do not discriminate on the basis of race, color, national origin, age, disability, sex, sexual orientation, or gender identity.               Review of your medicines      START taking        Dose / Directions    levofloxacin 750 MG tablet   Commonly known as:  LEVAQUIN   Used for:  Alcoholic cirrhosis of liver with ascites (H), Essential hypertension        Dose:  750 mg   Take 1 tablet (750 mg) by mouth daily   Quantity:  5 tablet   Refills:  0         CONTINUE these medicines which have NOT CHANGED        Dose / Directions    nadolol 20 MG tablet   Commonly known as:  CORGARD   Used for:  Alcoholic cirrhosis of liver (H)        Dose:  20 mg   Take 1 tablet (20 mg) by mouth daily   Quantity:  30 tablet   Refills:  1       ranitidine 150 MG tablet   Commonly known as:  ZANTAC        Dose:  150 mg   Take 150 mg by mouth   Refills:  0       TAMIFLU PO        Take by mouth daily   Refills:  0            Where to get your medicines      These medications were sent to [id number] - [place] - [address] [address] [address]     Phone:  [phone]     levofloxacin 750 MG tablet               ANTIBIOTIC INSTRUCTION     You've Been  Prescribed an Antibiotic - Now What?  Your healthcare team thinks that you or your loved one might have an infection. Some infections can be treated with antibiotics, which are powerful, life-saving drugs. Like all medications, antibiotics have side effects and should only be used when necessary. There are some important things you should know about your antibiotic treatment.      Your healthcare team may run tests before you start taking an antibiotic.    Your team may take samples (e.g., from your blood, urine or other areas) to run tests to look for bacteria. These test can be important to determine if you need an antibiotic at all and, if you do, which antibiotic will work best.      Within a few days, your healthcare team might change or even stop your antibiotic.    Your team may start you on an antibiotic while they are working to find out what is making you sick.    Your team might change your antibiotic because test results show that a different antibiotic would be better to treat your infection.    In some cases, once your team has more information, they learn that you do not need an antibiotic at all. They may find out that you don't have an infection, or that the antibiotic you're taking won't work against your infection. For example, an infection caused by a virus can't be treated with antibiotics. Staying on an antibiotic when you don't need it is more likely to be harmful than helpful.      You may experience side effects from your antibiotic.    Like all medications, antibiotics have side effects. Some of these can be serious.    Let you healthcare team know if you have any known allergies when you are admitted to the hospital.    One significant side effect of nearly all antibiotics is the risk of severe and sometimes deadly diarrhea caused by Clostridium difficile (C. Difficile). This occurs when a person takes antibiotics because some good germs are destroyed. Antibiotic use allows C. diificile to  take over, putting patients at high risk for this serious infection.    As a patient or caregiver, it is important to understand your or your loved one's antibiotic treatment. It is especially important for caregivers to speak up when patients can't speak for themselves. Here are some important questions to ask your healthcare team.    What infection is this antibiotic treating and how do you know I have that infection?    What side effects might occur from this antibiotic?    How long will I need to take this antibiotic?    Is it safe to take this antibiotic with other medications or supplements (e.g., vitamins) that I am taking?     Are there any special directions I need to know about taking this antibiotic? For example, should I take it with food?    How will I be monitored to know whether my infection is responding to the antibiotic?    What tests may help to make sure the right antibiotic is prescribed for me?      Information provided by:  www.cdc.gov/getsmart  U.S. Department of Health and Human Services  Centers for disease Control and Prevention  National Center for Emerging and Zoonotic Infectious Diseases  Division of Healthcare Quality Promotion         Protect others around you: Learn how to safely use, store and throw away your medicines at www.disposemymeds.org.             Medication List: This is a list of all your medications and when to take them. Check marks below indicate your daily home schedule. Keep this list as a reference.      Medications           Morning Afternoon Evening Bedtime As Needed    levofloxacin 750 MG tablet   Commonly known as:  LEVAQUIN   Take 1 tablet (750 mg) by mouth daily   Last time this was given:  500 mg on 1/15/2018  6:52 PM                                nadolol 20 MG tablet   Commonly known as:  CORGARD   Take 1 tablet (20 mg) by mouth daily   Last time this was given:  20 mg on 1/15/2018  9:28 PM                                ranitidine 150 MG tablet   Commonly  known as:  ZANTAC   Take 150 mg by mouth                                TAMIFLU PO   Take by mouth daily   Last time this was given:  75 mg on 1/16/2018  8:52 AM                                          More Information        Patient Education    Levofloxacin Ophthalmic drops, solution    Levofloxacin Oral solution    Levofloxacin Oral tablet    Levofloxacin Solution for injection    Levofloxacin, Dextrose Solution for injection  Levofloxacin Oral tablet  What is this medicine?  LEVOFLOXACIN (ronald rick FLOX a sin) is a quinolone antibiotic. It is used to treat certain kinds of bacterial infections. It will not work for colds, flu, or other viral infections.  This medicine may be used for other purposes; ask your health care provider or pharmacist if you have questions.  What should I tell my health care provider before I take this medicine?  They need to know if you have any of these conditions:    cerebral disease    irregular heartbeat    kidney disease    seizure disorder    an unusual or allergic reaction to levofloxacin, other antibiotics or medicines, foods, dyes, or preservatives    pregnant or trying to get pregnant    breast-feeding  How should I use this medicine?  Take this medicine by mouth with a full glass of water. Follow the directions on the prescription label. This medicine can be taken with or without food. Take your medicine at regular intervals. Do not take your medicine more often than directed. Do not skip doses or stop your medicine early even if you feel better. Do not stop taking except on your doctor's advice.  A special MedGuide will be given to you by the pharmacist with each prescription and refill. Be sure to read this information carefully each time.  Talk to your pediatrician regarding the use of this medicine in children. While this drug may be prescribed for children as young as 6 months for selected conditions, precautions do apply.  Overdosage: If you think you have taken too much  of this medicine contact a poison control center or emergency room at once.  NOTE: This medicine is only for you. Do not share this medicine with others.  What if I miss a dose?  If you miss a dose, take it as soon as you remember. If it is almost time for your next dose, take only that dose. Do not take double or extra doses.  What may interact with this medicine?  Do not take this medicine with any of the following medications:    arsenic trioxide    chloroquine    droperidol    medicines for irregular heart rhythm like amiodarone, disopyramide, dofetilide, flecainide, quinidine, procainamide, sotalol    some medicines for depression or mental problems like phenothiazines, pimozide, and ziprasidone  This medicine may also interact with the following medications:    amoxapine    antacids    cisapride    dairy products    didanosine (ddI) buffered tablets or powder    haloperidol    multivitamins    NSAIDS, medicines for pain and inflammation, like ibuprofen or naproxen    retinoid products like tretinoin or isotretinoin    risperidone    some other antibiotics like clarithromycin or erythromycin    sucralfate    theophylline    warfarin  This list may not describe all possible interactions. Give your health care provider a list of all the medicines, herbs, non-prescription drugs, or dietary supplements you use. Also tell them if you smoke, drink alcohol, or use illegal drugs. Some items may interact with your medicine.  What should I watch for while using this medicine?  Tell your doctor or health care professional if your symptoms do not improve or if they get worse. Drink several glasses of water a day and cut down on drinks that contain caffeine. You must not get dehydrated while taking this medicine.  You may get drowsy or dizzy. Do not drive, use machinery, or do anything that needs mental alertness until you know how this medicine affects you. Do not sit or stand up quickly, especially if you are an older  patient. This reduces the risk of dizzy or fainting spells.  This medicine can make you more sensitive to the sun. Keep out of the sun. If you cannot avoid being in the sun, wear protective clothing and use a sunscreen. Do not use sun lamps or tanning beds/booths. Contact your doctor if you get a sunburn.  If you are a diabetic monitor your blood glucose carefully. If you get an unusual reading stop taking this medicine and call your doctor right away.  Do not treat diarrhea with over-the-counter products. Contact your doctor if you have diarrhea that lasts more than 2 days or if the diarrhea is severe and watery.  Avoid antacids, calcium, iron, and zinc products for 2 hours before and 2 hours after taking a dose of this medicine.  What side effects may I notice from receiving this medicine?  Side effects that you should report to your doctor or health care professional as soon as possible:    allergic reactions like skin rash or hives, swelling of the face, lips, or tongue    changes in vision    confusion, nightmares or hallucinations    difficulty breathing    irregular heartbeat, chest pain    joint, muscle or tendon pain    pain or difficulty passing urine    persistent headache with or without blurred vision    redness, blistering, peeling or loosening of the skin, including inside the mouth    seizures    unusual pain, numbness, tingling, or weakness    vaginal irritation, discharge  Side effects that usually do not require medical attention (report to your doctor or health care professional if they continue or are bothersome):    diarrhea    dry mouth    headache    stomach upset, nausea    trouble sleeping  This list may not describe all possible side effects. Call your doctor for medical advice about side effects. You may report side effects to FDA at 8-290-FDA-5398.  Where should I keep my medicine?  Keep out of the reach of children.  Store at room temperature between 15 and 30 degrees C (59 and 86  degrees F). Keep in a tightly closed container. Throw away any unused medicine after the expiration date.  NOTE:This sheet is a summary. It may not cover all possible information. If you have questions about this medicine, talk to your doctor, pharmacist, or health care provider. Copyright  2016 Gold Standard

## 2018-01-15 NOTE — IP AVS SNAPSHOT
[facility]    [address] [place] [id number]    Phone:  [phone]    Fax:  [phone]                                       After Visit Summary   1/15/2018    Ramo Lockwood    MRN: [id number]           After Visit Summary Signature Page     I have received my discharge instructions, and my questions have been answered. I have discussed any challenges I see with this plan with the nurse or doctor.    ..........................................................................................................................................  Patient/Patient Representative Signature      ..........................................................................................................................................  Patient Representative Print Name and Relationship to Patient    ..................................................               ................................................  Date                                            Time    ..........................................................................................................................................  Reviewed by Signature/Title    ...................................................              ..............................................  Date                                                            Time

## 2018-01-16 VITALS
SYSTOLIC BLOOD PRESSURE: 98 MMHG | BODY MASS INDEX: 28.12 KG/M2 | DIASTOLIC BLOOD PRESSURE: 58 MMHG | HEART RATE: 77 BPM | TEMPERATURE: 97.9 F | HEIGHT: 70 IN | OXYGEN SATURATION: 94 % | WEIGHT: 196.43 LBS | RESPIRATION RATE: 20 BRPM

## 2018-01-16 LAB
ANION GAP SERPL CALCULATED.3IONS-SCNC: 9 MMOL/L (ref 3–14)
BUN SERPL-MCNC: 15 MG/DL (ref 7–30)
CALCIUM SERPL-MCNC: 7.2 MG/DL (ref 8.5–10.1)
CHLORIDE SERPL-SCNC: 107 MMOL/L (ref 94–109)
CHOLEST SERPL-MCNC: 72 MG/DL
CO2 SERPL-SCNC: 20 MMOL/L (ref 20–32)
CREAT SERPL-MCNC: 1.38 MG/DL (ref 0.66–1.25)
ERYTHROCYTE [DISTWIDTH] IN BLOOD BY AUTOMATED COUNT: 15.6 % (ref 10–15)
GFR SERPL CREATININE-BSD FRML MDRD: 51 ML/MIN/1.7M2
GLUCOSE SERPL-MCNC: 83 MG/DL (ref 70–99)
GRAM STN SPEC: ABNORMAL
HCT VFR BLD AUTO: 31 % (ref 40–53)
HDLC SERPL-MCNC: 15 MG/DL
HGB BLD-MCNC: 11 G/DL (ref 13.3–17.7)
LDLC SERPL CALC-MCNC: 42 MG/DL
MCH RBC QN AUTO: 32.8 PG (ref 26.5–33)
MCHC RBC AUTO-ENTMCNC: 35.5 G/DL (ref 31.5–36.5)
MCV RBC AUTO: 93 FL (ref 78–100)
NONHDLC SERPL-MCNC: 57 MG/DL
PLATELET # BLD AUTO: 32 10E9/L (ref 150–450)
POTASSIUM SERPL-SCNC: 4 MMOL/L (ref 3.4–5.3)
PROCALCITONIN SERPL-MCNC: 2.84 NG/ML
RBC # BLD AUTO: 3.35 10E12/L (ref 4.4–5.9)
SODIUM SERPL-SCNC: 136 MMOL/L (ref 133–144)
SPECIMEN SOURCE: ABNORMAL
TRIGL SERPL-MCNC: 77 MG/DL
WBC # BLD AUTO: 7.1 10E9/L (ref 4–11)

## 2018-01-16 PROCEDURE — 80061 LIPID PANEL: CPT | Performed by: INTERNAL MEDICINE

## 2018-01-16 PROCEDURE — 80048 BASIC METABOLIC PNL TOTAL CA: CPT | Performed by: INTERNAL MEDICINE

## 2018-01-16 PROCEDURE — 84145 PROCALCITONIN (PCT): CPT | Performed by: INTERNAL MEDICINE

## 2018-01-16 PROCEDURE — 87070 CULTURE OTHR SPECIMN AEROBIC: CPT | Performed by: INTERNAL MEDICINE

## 2018-01-16 PROCEDURE — 99217 ZZC OBSERVATION CARE DISCHARGE: CPT | Performed by: NURSE PRACTITIONER

## 2018-01-16 PROCEDURE — 87147 CULTURE TYPE IMMUNOLOGIC: CPT | Performed by: INTERNAL MEDICINE

## 2018-01-16 PROCEDURE — G0378 HOSPITAL OBSERVATION PER HR: HCPCS

## 2018-01-16 PROCEDURE — 85027 COMPLETE CBC AUTOMATED: CPT | Performed by: INTERNAL MEDICINE

## 2018-01-16 PROCEDURE — A9270 NON-COVERED ITEM OR SERVICE: HCPCS | Mod: GY | Performed by: INTERNAL MEDICINE

## 2018-01-16 PROCEDURE — 36415 COLL VENOUS BLD VENIPUNCTURE: CPT | Performed by: INTERNAL MEDICINE

## 2018-01-16 PROCEDURE — 25000132 ZZH RX MED GY IP 250 OP 250 PS 637: Mod: GY | Performed by: INTERNAL MEDICINE

## 2018-01-16 PROCEDURE — 87205 SMEAR GRAM STAIN: CPT | Performed by: INTERNAL MEDICINE

## 2018-01-16 RX ORDER — LEVOFLOXACIN 750 MG/1
750 TABLET, FILM COATED ORAL DAILY
Qty: 5 TABLET | Refills: 0 | Status: SHIPPED | OUTPATIENT
Start: 2018-01-16 | End: 2018-10-27

## 2018-01-16 RX ADMIN — TRAZODONE HYDROCHLORIDE 25 MG: 50 TABLET ORAL at 01:00

## 2018-01-16 RX ADMIN — OSELTAMIVIR PHOSPHATE 75 MG: 75 CAPSULE ORAL at 08:52

## 2018-01-16 NOTE — PLAN OF CARE
Problem: Patient Care Overview  Goal: Plan of Care/Patient Progress Review  Outcome: No Change  Pt alert and orientation.  Ate well for supper.  Medicated for sore throat with tylenol.  Cough syrup given for frequent cough. Notice less coughing.  Need sputum for culture. Skin good.  No bruises or open sores. IV infusing . Site good.

## 2018-01-16 NOTE — PROVIDER NOTIFICATION
DATE:  1/16/2018   TIME OF RECEIPT FROM LAB:  0553   LAB TEST:  Platelet  LAB VALUE:  32  RESULTS GIVEN WITH READ-BACK TO (PROVIDER):  Linda Resendez MD  TIME LAB VALUE REPORTED TO PROVIDER:   0555

## 2018-01-16 NOTE — PLAN OF CARE
"BP 98/53  Pulse 77  Temp 98.9  F (37.2  C) (Tympanic)  Resp 20  Ht 1.778 m (5' 10\")  Wt 89.1 kg (196 lb 6.9 oz)  SpO2 94%  BMI 28.18 kg/m2    Pt alert and oriented upon initial assessment. C/O sore throat unable to rate, voice very very rasp. Requested something to help him sleep, Trazodone 25mg given, pt slept well throughout night. Lungs clear. Mid 90s on RA, denies SOB. BS active. HR regular. SBA to bathroom, drinking and voiding well. IVF@125. No falls or injuries this shift. Will continue to monitor.     Face to face report given with opportunity to observe patient.    Report given to LAUREN Sunshine   1/16/2018  7:17 AM      "

## 2018-01-16 NOTE — ED NOTES
Pt complaining that he cant stop coughing, hob elevated, sats are 99% on room air, brother at bedside. Reassured pt and will update provider

## 2018-01-16 NOTE — H&P
[facility]    History and Physical  Hospitalist       Date of Admission:  1/15/2018  Date of Service (when I saw the patient): 01/15/18    Assessment & Plan   Ramo Lockwood is a 67 year old male who presents with cough and phlegm.    1. Influenza A- Question whether Tamiflu is working for him. Smoldering presentation. Ongoing symptoms. Not able to hydrate at home. Cough bothering him a lot. Admitted for symptom control. Needing no O2. Continue Tamiflu- finish course and supportive mgmt. Does have a pharyngitis but no pus spots or tonsillitis. Sputum Cx ordered.  2. LLL pneumonia- There is a new effusion which is very small and may represent a superimposed bacterial infection. 7 day course of Levofloxacin started (adjusted for renal fx).  3. Alcoholic liver cirrhosis- Per his hepatologist he's doing well. Continue Nadolol. No h/o GI bleed or encephalopathy.   4. Dehydration- IVFs. Follow labs.   5. DAY- Likely due to prerenal azotemia. Hydrate and continue to follow.   6. Baseline INR is >1.5, so no anticoagulation for DVT pro. Use mechanical.     Active Problems:    Influenza A    DVT Prophylaxis: Pneumatic Compression Devices  Code Status: Full Code    Disposition: Expected discharge in 1-2 days once clinically better.    mick [facility]    Primary Care Physician   Aníbal Dave    Chief Complaint   Cough/phlegm.    History is obtained from the patient    History of Present Illness   Ramo Lockwood is a 67 year old male who presents with cough and phlegm. Started about 8 days ago. About 4 days ago went to his clinic, was Dxed with Influenza A and started on Tamiflu. On day 3 but not getting better. Cough with green phlegm and throat irritation. Appetite is down and has muscle aches and pains.     Past Medical History    I have reviewed this patient's medical history and updated it with pertinent information if needed.   Past Medical History:   Diagnosis Date     Acute conjunctivitis, unspecified  11/04/2000     Chronic pain     back pain     Diabetes mellitus (H)      Gout      Hyperlipidemia      Hypertension      Special screening for malignant neoplasms, colon 02/16/2009       Past Surgical History   I have reviewed this patient's surgical history and updated it with pertinent information if needed.  Past Surgical History:   Procedure Laterality Date     COLONOSCOPY       PHACOEMULSIFICATION WITH STANDARD INTRAOCULAR LENS IMPLANT  6/10/2013    Procedure: PHACOEMULSIFICATION WITH STANDARD INTRAOCULAR LENS IMPLANT;  CATARACT EXTRACTION WITH INTRA OCLULAR LENS LEFT;  Surgeon: Benoit Philip MD;  Location: [facility]     PHACOEMULSIFICATION WITH STANDARD INTRAOCULAR LENS IMPLANT  7/23/2013    Procedure: PHACOEMULSIFICATION WITH STANDARD INTRAOCULAR LENS IMPLANT;  CATARACT EXTRACTION WITH INTRA OCULAR LENS RIGHT;  Surgeon: Benoit Philip MD;  Location: [facility]       Prior to Admission Medications   Prior to Admission Medications   Prescriptions Last Dose Informant Patient Reported? Taking?   Oseltamivir Phosphate (TAMIFLU PO) 1/15/2018 at 0900  Yes Yes   Sig: Take by mouth daily   nadolol (CORGARD) 20 MG tablet 1/14/2018 at 2100  No Yes   Sig: Take 1 tablet (20 mg) by mouth daily   ranitidine (ZANTAC) 150 MG tablet Unknown at Unknown time  Yes No   Sig: Take 150 mg by mouth      Facility-Administered Medications: None     Allergies   Allergies   Allergen Reactions     Codeine Camsylate        Social History   I have reviewed this patient's social history and updated it with pertinent information if needed. Ramo Lockwood  reports that he has never smoked. He has never used smokeless tobacco. He reports that he does not drink alcohol or use illicit drugs.    Family History   I have reviewed this patient's family history and updated it with pertinent information if needed.   Family History   Problem Relation Age of Onset     Cardiovascular Father      MI       Review of Systems   The 10 point Review of Systems is  negative other than noted in the HPI or here.     Physical Exam   Temp: 99.4  F (37.4  C) Temp src: Tympanic BP: 101/74 Pulse: 77   Resp: 15 SpO2: 97 % O2 Device: None (Room air)    Vital Signs with Ranges  Temp:  [98.4  F (36.9  C)-99.4  F (37.4  C)] 99.4  F (37.4  C)  Pulse:  [74-77] 77  Resp:  [13-21] 15  BP: (101-121)/(51-74) 101/74  SpO2:  [94 %-100 %] 97 %  190 lbs 0 oz    Constitutional: In NAD. NOT toxic looking.   Eyes: Unremarkable  HEENT: Inflammed oropharynx. No LDP.   Respiratory: Clear  Cardiovascular: S1 + S2.   GI: Soft, NT, ND, no VM.  Skin: No rashes/lesions.  Musculoskeletal: NO pedal edema.  Neurologic: Grossly non-focal.  Psychiatric: Affect normal.     Data   Data reviewed today:  I personally reviewed all data below.     Recent Labs  Lab 01/15/18  1640   WBC 12.0*   HGB 12.5*   MCV 93   PLT 57*      POTASSIUM 4.0   CHLORIDE 104   CO2 22   BUN 13   CR 1.44*   ANIONGAP 10   NESSA 7.8*   *   ALBUMIN 2.4*   PROTTOTAL 6.9   BILITOTAL 2.6*   ALKPHOS 189*   ALT 23   AST 49*       Lactic Acid   Date Value Ref Range Status   01/15/2018 2.0 0.4 - 2.0 mmol/L Final       No results found for this or any previous visit (from the past 24 hour(s)).

## 2018-01-16 NOTE — PLAN OF CARE
[facility] Inpatient Admission Note:    Patient admitted to 3212/3212-1 at approximately 1930 via cart accompanied by transport tech from emergency room . Report received from Anitra huang from ED in SBAR format at 1920 via telephone. Patient transferred to bed via self.. Patient is alert and oriented X 3, reports pain; rates at 9 on 0-10 scale.  Patient oriented to room, unit, hourly rounding, and plan of care. Explained admission packet and patient handbook with patient bill of rights brochure. Will continue to monitor and document as needed.     Inpatient Nursing criteria listed below was met:      Health care directives status obtained and documented: Yes      Care Everywhere authorization obtained No      MRSA swab completed for patient 65 years and older: Yes      Patient identifies a surrogate decision maker:no  If yes, who: Contact Information:see face sheet      Core Measure diagnosis present:No. If yes, state diagnosis: na       Is initial lactic acid >2.0? No. LA- 2.0      Vaccination assessment and education completed: Yes   Vaccinations received prior to admission: Pneumovax yes  Influenza(seasonal)  NO   Vaccination(s) ordered: influenza vaccine      Clergy visit ordered if patient requests: Yes      Skin issues/needs documented: N/A      Isolation Patient: yes Education given, correct sign in place and documentation row added to PCS:  Yes      Fall Prevention N/A: Care plan updated, education given and documented, sticker and magnet in place: N/A      Care Plan initiated: Yes      Education Documented (including assessment): Yes      Patient has discharge needs : No If yes, please explain:na

## 2018-01-16 NOTE — PLAN OF CARE
Patient discharged at 11:15 AM via wheel chair accompanied by brother and staff. Prescriptions sent to patients preferred pharmacy. All belongings sent with patient.     Discharge instructions reviewed with patient. Listed belongings gathered and returned to patient. done    Patient discharged to home.   Report called to NA    Core Measures and Vaccines  Core Measures applicable during stay: No.   Pneumonia and Influenza given prior to discharge, if indicated: yes     Surgical Patient   Surgical Procedures during stay: NA  Did patient receive discharge instruction on wound care and recognition of infection symptoms? N/A    MISC  Follow up appointment made:  Yes  Home and hospital aquired medications returned to patient: NA  Patient reports pain was well managed at discharge: Yes

## 2018-01-16 NOTE — PROGRESS NOTES
Assessment completed see flow sheet.    Ramo is sitting in bed, his PCP is Dr Aníbal Dave, his dentist is Dr Melendez and his eye doctor is Dr Lior Salazar. [facility]. He does not have a POA, he has a healthcare directive on file. He uses [facility] in [place]. He is a . [facility], [facility] and does not want to establish at this time.    Ramo lives at home alone, he states he feels safe and his home is well maintained. He performs his own self cares, manages his medication and appointment schedules. He does his own shopping and food prep, drives and has reliable transportation. He is retired from [employer].    Ramo sleeps well and has no mood concerns. He has never smoked and does not consume alcohol. His mason is important to him. He denies stressors. His support system is his brother and children. He enjoys family time, traveling, hunting and fishing.    Ramo is being discharged to home today, no needs identified. His brother will transport.     LOC: alert    Others present: Patient    Dx: Influenza A    Lives with: Lives With: alone    Living at: Living Arrangements: house    Equipment used:  None    Support System: Description of Support System: Supportive, Involved    Primary PCP: Aníbal Dave    POA/Guardian: No     Health Care Directive: YES     Pharmacy: [facility] [place]    :  No    Homecare/County Services:   No    Adequate Resources for needs (housing, utilities, food/med): YES    Meds and appointments management: YES    Work: NO    Transportation: YES     ADLs: independent    Falls: No    Able to Return to Prior Living Arrangements: YES    [place]: YES    Goals: feel better    Barriers: none    Needs: Demonstrates Competency    GABO: None    Discharge Plan: home

## 2018-01-16 NOTE — DISCHARGE SUMMARY
[facility]    Discharge Summary  Hospitalist    Date of Admission:  1/15/2018  Date of Discharge:  1/16/2018  Discharging Provider: Aga Correa CNP  Date of Service (when I saw the patient): 01/16/18    Discharge Diagnoses   Influenza A  Possible early bacterial pneumonia  Chronic liver disease  Type 2 DM, diet controlled  Alcoholic cirrhosis  Esophageal varices  Normocytic anemia,chronic  Gout    History of Present Illness   Presented to PCP 1/12/18 with 4 day history of cough, sore throat and feeling feverish. He test positive for Influenza A and was started on Tamiflu. He presented to ED 1/15 with chest wall pain from coughing, bilateral ear pain and ongoing throat pain without improvement from clinic presentation.     Hospital Course   Ramo Lockwood was admitted on 1/15/2018.  The following problems were addressed during his hospitalization:      Influenza A: Started on Tamiflu 1/12 in clinic with symptoms x4 days at that time. He will continue for full 5 day course, advised him he did receive 2 doses here so he should discard 2 doses at home.       Possible early bacterial pneumonia: CXR did not show any consolidation, however based on exam there was concern for early pneumonia and patient felt unsafe to return home. He was given IV Levaquin in ED and IV fluids. He did not have any fever or hypoxia. However, his procalcitonin is elevated this morning at 2.84 which would indicate a bacterial infection. He also provided a sputum sample that does have + cocci present. He does have sourse and diminishhed left lower lobe on exam this morning. He feels improved this morning, slept well overnight. Because his respiratory status is stable and he is not having any hypoxia he is discharged home to continue full course of oral Levaquin. He is not having any wheezing or tightness at this time so steroids or nebs would not be of significant benefit. Follow-up with PCP in 5-7 days. Instructed to RTC if he  develops new or worsening symptoms.       Aga Correa, CNP      Pending Results     Unresulted Labs Ordered in the Past 30 Days of this Admission     Date and Time Order Name Status Description    1/15/2018 2211 Active MRSA Surveillance Culture Preliminary     1/15/2018 2109 Sputum Culture Aerobic Bacterial In process     1/15/2018 1836 Blood culture Preliminary     1/15/2018 1836 Blood culture Preliminary           Code Status   Full Code       Primary Care Physician   Aníbal Dave        Discharge Disposition   Discharged to home  Condition at discharge: Stable    Consultations This Hospital Stay   None    Time Spent on this Encounter   I, Aga Correa, personally saw the patient today and spent greater than 30 minutes discharging this patient.    Discharge Orders     Reason for your hospital stay   Influenza A with possible early pneumonia     Follow-up and recommended labs and tests    Follow up with primary care provider, Aníbal Dave, within 7 days for hospital follow- up.  No follow up labs or test are needed.     Activity   Your activity upon discharge: activity as tolerated     When to contact your care team   Call your primary doctor if you have any of the following: fever, shortness of breath, chest pain, any new or worsening symptoms.     Discharge Instructions   You received 2 doses of Tamiflu while your were in the hospital, so do not take 2 of the doses from your home prescription. Your last dose should be tomorrow morning to complete the course.     Full Code     Diet   Follow this diet upon discharge: Orders Placed This Encounter     Regular Diet Adult       Discharge Medications   Current Discharge Medication List      START taking these medications    Details   levofloxacin (LEVAQUIN) 750 MG tablet Take 1 tablet (750 mg) by mouth daily  Qty: 5 tablet, Refills: 0    Associated Diagnoses: Influenza A; Alcoholic cirrhosis of liver with ascites (H); Essential hypertension          CONTINUE these medications which have NOT CHANGED    Details   Oseltamivir Phosphate (TAMIFLU PO) Take by mouth daily      nadolol (CORGARD) 20 MG tablet Take 1 tablet (20 mg) by mouth daily  Qty: 30 tablet, Refills: 1    Associated Diagnoses: Alcoholic cirrhosis of liver (H)      ranitidine (ZANTAC) 150 MG tablet Take 150 mg by mouth           Allergies   Allergies   Allergen Reactions     Codeine Camsylate      Data   Most Recent 3 CBC's:  Recent Labs   Lab Test  01/16/18   0523  01/15/18   1640  09/30/15   0804   WBC  7.1  12.0*  4.5   HGB  11.0*  12.5*  10.2*   MCV  93  93  95   PLT  32*  57*  88*      Most Recent 3 BMP's:  Recent Labs   Lab Test  01/16/18   0523  01/15/18   1640  09/30/15   0804   NA  136  136  137   POTASSIUM  4.0  4.0  3.4   CHLORIDE  107  104  103   CO2  20  22  27   BUN  15  13  11   CR  1.38*  1.44*  1.34*   ANIONGAP  9  10  7   NESSA  7.2*  7.8*  7.8*   GLC  83  106*  100*     Most Recent 2 LFT's:  Recent Labs   Lab Test  01/15/18   1640  09/30/15   0804   AST  49*  47*   ALT  23  26   ALKPHOS  189*  118   BILITOTAL  2.6*  3.0*     Most Recent INR's and Anticoagulation Dosing History:  Anticoagulation Dose History     Recent Dosing and Labs Latest Ref Rng & Units 4/19/2015 5/8/2015 6/5/2015 6/6/2015 6/7/2015 9/28/2015 9/30/2015    INR 0.80 - 1.20 1.85(H) 1.84(H) 1.49(H) 1.61(H) 1.54(H) 1.58(H) 1.55(H)        Most Recent 3 Troponin's:  Recent Labs   Lab Test  06/05/15   1120   TROPI  <0.015  The 99th percentile for upper reference range is 0.045 ug/L.  Troponin values in   the range of 0.045 - 0.120 ug/L may be associated with risks of adverse   clinical events.       Most Recent Cholesterol Panel:  Recent Labs   Lab Test  01/16/18   0523   CHOL  72   LDL  42   HDL  15*   TRIG  77     Most Recent 6 Bacteria Isolates From Any Culture (See EPIC Reports for Culture Details):  Recent Labs   Lab Test  01/15/18   2213  01/15/18   1848  01/15/18   1844  09/28/15   1125  06/06/15   0800   06/05/15   1630   CULT  Culture in progress  No growth after 11 hours  No growth after 11 hours  10,000 to 50,000 colonies/mL Coagulase negative Staphylococcus No further   identification or sensitivity done  *  No MRSA isolated  No growth after 7 days     Most Recent TSH, T4 and A1c Labs:  Recent Labs   Lab Test  05/19/14   0436   A1C  5.4     Results for orders placed or performed during the hospital encounter of 01/15/18   XR Chest 2 Views    Narrative    PROCEDURE:  XR CHEST 2 VW    HISTORY:  Inlfuenza, worsening dyspnea; .     COMPARISON:  2015    FINDINGS:   The cardiac silhouette is normal in size. The pulmonary vasculature is  normal.  Some linear atelectasis or scarring at the left lung base.  There is elevation left hemidiaphragm. No pleural effusion or  pneumothorax.      Impression    IMPRESSION:  Left basilar atelectasis or scarring. Elevated left  hemidiaphragm unchanged from 2015      ROBERTA WILCOX MD

## 2018-01-16 NOTE — PROGRESS NOTES
Name: Ramo Lockwood    MRN#: [id number]    Reason for Hospitalization: Influenza A [J10.1]  Generalized muscle weakness [M62.81]  Acute kidney injury superimposed on chronic kidney disease (H) [N17.9, N18.9]    Discharge Date: 1/16/2018    Patient / Family response to discharge plan: in agreement    Follow-Up Appt: No future appointments.    Other Providers (Care Coordinator, County Services, PCA services etc): No    Discharge Disposition: home, transported by brothbarrie Urbano

## 2018-01-16 NOTE — PHARMACY
[facility]    Pharmacy      Antimicrobial Stewardship Note     Current antimicrobial therapy:  Anti-infectives (Future)    Start     Dose/Rate Route Frequency Ordered Stop    01/16/18 1900  levofloxacin (LEVAQUIN) tablet 500 mg      500 mg Oral DAILY 01/15/18 2058      01/15/18 2100  oseltamivir (TAMIFLU) capsule 75 mg      75 mg Oral 2 TIMES DAILY 01/15/18 2058 01/18/18 0859          Indication: Community Acquired Pneumonia, Influenza A     Pertinent labs:  Creatinine   Creatinine   Date Value Ref Range Status   01/16/2018 1.38 (H) 0.66 - 1.25 mg/dL Final   01/15/2018 1.44 (H) 0.66 - 1.25 mg/dL Final   09/30/2015 1.34 (H) 0.66 - 1.25 mg/dL Final     WBC   WBC   Date Value Ref Range Status   01/16/2018 7.1 4.0 - 11.0 10e9/L Final   01/15/2018 12.0 (H) 4.0 - 11.0 10e9/L Final   09/30/2015 4.5 4.0 - 11.0 10e9/L Final     Procalcitonin No results found for: PCAL  CRP   CRP Inflammation   Date Value Ref Range Status   06/05/2015 8.0 0.0 - 8.0 mg/L Final       Culture Results:     Gram stain [[id number]] (Abnormal) Collected: 01/16/18 0100       Order Status: Completed Specimen: Sputum Updated: 01/16/18 0637        Specimen Description Sputum        Gram Stain <10 Squamous epithelial cells/low power field         >25 PMNs/low power field         Many   Gram positive cocci in pairs    (A)         Few   Gram positive cocci in chains    (A)         Gram stain review consistent with reported results.       Active MRSA Surveillance Culture [[id number]] Collected: 01/15/18 2213       Order Status: Completed Specimen: Nose from Nose Updated: 01/16/18 0628        Specimen Description Nose        Culture Micro Culture in progress       Blood culture [[id number]] Collected: 01/15/18 1844       Order Status: Completed Specimen: Blood Updated: 01/16/18 0613        Specimen Description Blood        Special Requests Right Arm        Culture Micro No growth after 11 hours       Blood culture [[id number]] Collected: 01/15/18  1848       Order Status: Completed Specimen: Blood Updated: 01/16/18 0613        Specimen Description Blood        Special Requests Right Arm        Culture Micro No growth after 11 hours           Recommendations/Interventions:  1. No recommendations at this time.    Salty Contreras [facility]  January 16, 2018

## 2018-01-17 LAB
BACTERIA SPEC CULT: ABNORMAL
BACTERIA SPEC CULT: ABNORMAL
BACTERIA SPEC CULT: NORMAL
SPECIMEN SOURCE: ABNORMAL
SPECIMEN SOURCE: NORMAL

## 2018-01-22 LAB
BACTERIA SPEC CULT: NORMAL
BACTERIA SPEC CULT: NORMAL
Lab: NORMAL
Lab: NORMAL
SPECIMEN SOURCE: NORMAL
SPECIMEN SOURCE: NORMAL

## 2018-01-29 ENCOUNTER — TELEPHONE (OUTPATIENT)
Dept: CASE MANAGEMENT | Facility: HOSPITAL | Age: 68
End: 2018-01-29

## 2018-01-30 ENCOUNTER — TELEPHONE (OUTPATIENT)
Dept: CASE MANAGEMENT | Facility: HOSPITAL | Age: 68
End: 2018-01-30

## 2018-10-27 ENCOUNTER — HOSPITAL ENCOUNTER (EMERGENCY)
Facility: HOSPITAL | Age: 68
Discharge: HOME | End: 2018-10-27
Attending: FAMILY MEDICINE | Admitting: FAMILY MEDICINE
Payer: MEDICARE

## 2018-10-27 VITALS
RESPIRATION RATE: 16 BRPM | SYSTOLIC BLOOD PRESSURE: 126 MMHG | OXYGEN SATURATION: 96 % | TEMPERATURE: 97.8 F | HEART RATE: 71 BPM | DIASTOLIC BLOOD PRESSURE: 87 MMHG

## 2018-10-27 DIAGNOSIS — R58 INTERNAL HEMORRHAGE: ICD-10-CM

## 2018-10-27 DIAGNOSIS — K62.5 RECTAL BLEEDING: ICD-10-CM

## 2018-10-27 LAB
ABO + RH BLD: NORMAL
ABO + RH BLD: NORMAL
ALBUMIN SERPL-MCNC: 2.2 G/DL (ref 3.4–5)
ALP SERPL-CCNC: 172 U/L (ref 40–150)
ALT SERPL W P-5'-P-CCNC: 23 U/L (ref 0–70)
ANION GAP SERPL CALCULATED.3IONS-SCNC: 6 MMOL/L (ref 3–14)
AST SERPL W P-5'-P-CCNC: 48 U/L (ref 0–45)
BASOPHILS # BLD AUTO: 0 10E9/L (ref 0–0.2)
BASOPHILS NFR BLD AUTO: 0.9 %
BILIRUB SERPL-MCNC: 3.2 MG/DL (ref 0.2–1.3)
BLD GP AB SCN SERPL QL: NORMAL
BLOOD BANK CMNT PATIENT-IMP: NORMAL
BUN SERPL-MCNC: 6 MG/DL (ref 7–30)
CALCIUM SERPL-MCNC: 8.2 MG/DL (ref 8.5–10.1)
CHLORIDE SERPL-SCNC: 104 MMOL/L (ref 94–109)
CO2 SERPL-SCNC: 26 MMOL/L (ref 20–32)
CREAT SERPL-MCNC: 1.26 MG/DL (ref 0.66–1.25)
DIFFERENTIAL METHOD BLD: ABNORMAL
EOSINOPHIL # BLD AUTO: 0.2 10E9/L (ref 0–0.7)
EOSINOPHIL NFR BLD AUTO: 3.7 %
ERYTHROCYTE [DISTWIDTH] IN BLOOD BY AUTOMATED COUNT: 15.5 % (ref 10–15)
GFR SERPL CREATININE-BSD FRML MDRD: 56 ML/MIN/1.7M2
GLUCOSE SERPL-MCNC: 102 MG/DL (ref 70–99)
HCT VFR BLD AUTO: 34.5 % (ref 40–53)
HGB BLD-MCNC: 12 G/DL (ref 13.3–17.7)
IMM GRANULOCYTES # BLD: 0 10E9/L (ref 0–0.4)
IMM GRANULOCYTES NFR BLD: 0.2 %
INR PPP: 1.49 (ref 0.8–1.2)
LYMPHOCYTES # BLD AUTO: 0.7 10E9/L (ref 0.8–5.3)
LYMPHOCYTES NFR BLD AUTO: 15.5 %
MCH RBC QN AUTO: 34 PG (ref 26.5–33)
MCHC RBC AUTO-ENTMCNC: 34.8 G/DL (ref 31.5–36.5)
MCV RBC AUTO: 98 FL (ref 78–100)
MONOCYTES # BLD AUTO: 0.4 10E9/L (ref 0–1.3)
MONOCYTES NFR BLD AUTO: 9.3 %
NEUTROPHILS # BLD AUTO: 3 10E9/L (ref 1.6–8.3)
NEUTROPHILS NFR BLD AUTO: 70.4 %
NRBC # BLD AUTO: 0 10*3/UL
NRBC BLD AUTO-RTO: 0 /100
PLATELET # BLD AUTO: 78 10E9/L (ref 150–450)
POTASSIUM SERPL-SCNC: 4.4 MMOL/L (ref 3.4–5.3)
PROT SERPL-MCNC: 6.8 G/DL (ref 6.8–8.8)
RBC # BLD AUTO: 3.53 10E12/L (ref 4.4–5.9)
SODIUM SERPL-SCNC: 136 MMOL/L (ref 133–144)
SPECIMEN EXP DATE BLD: NORMAL
WBC # BLD AUTO: 4.3 10E9/L (ref 4–11)

## 2018-10-27 PROCEDURE — 80053 COMPREHEN METABOLIC PANEL: CPT | Performed by: FAMILY MEDICINE

## 2018-10-27 PROCEDURE — 85025 COMPLETE CBC W/AUTO DIFF WBC: CPT | Performed by: FAMILY MEDICINE

## 2018-10-27 PROCEDURE — 86850 RBC ANTIBODY SCREEN: CPT | Performed by: FAMILY MEDICINE

## 2018-10-27 PROCEDURE — 46221 LIGATION OF HEMORRHOID(S): CPT | Performed by: SURGERY

## 2018-10-27 PROCEDURE — 86901 BLOOD TYPING SEROLOGIC RH(D): CPT | Performed by: FAMILY MEDICINE

## 2018-10-27 PROCEDURE — 85610 PROTHROMBIN TIME: CPT | Performed by: FAMILY MEDICINE

## 2018-10-27 PROCEDURE — 86900 BLOOD TYPING SEROLOGIC ABO: CPT | Performed by: FAMILY MEDICINE

## 2018-10-27 PROCEDURE — 99202 OFFICE O/P NEW SF 15 MIN: CPT | Mod: 25 | Performed by: SURGERY

## 2018-10-27 PROCEDURE — 25000128 H RX IP 250 OP 636: Performed by: FAMILY MEDICINE

## 2018-10-27 PROCEDURE — 36415 COLL VENOUS BLD VENIPUNCTURE: CPT | Performed by: FAMILY MEDICINE

## 2018-10-27 PROCEDURE — 99283 EMERGENCY DEPT VISIT LOW MDM: CPT | Mod: Z6 | Performed by: FAMILY MEDICINE

## 2018-10-27 PROCEDURE — 99283 EMERGENCY DEPT VISIT LOW MDM: CPT | Mod: 25

## 2018-10-27 RX ORDER — SODIUM CHLORIDE 9 MG/ML
1000 INJECTION, SOLUTION INTRAVENOUS CONTINUOUS
Status: DISCONTINUED | OUTPATIENT
Start: 2018-10-27 | End: 2018-10-27 | Stop reason: HOSPADM

## 2018-10-27 RX ADMIN — SODIUM CHLORIDE 1000 ML: 9 INJECTION, SOLUTION INTRAVENOUS at 14:34

## 2018-10-27 RX ADMIN — SODIUM CHLORIDE 1000 ML: 9 INJECTION, SOLUTION INTRAVENOUS at 13:32

## 2018-10-27 NOTE — ED AVS SNAPSHOT
[facility]    [address] [place] [id number]    Phone:  [phone]                                       Ramo Lockwood   MRN: [id number]    Department:  [facility]   Date of Visit:  10/27/2018           After Visit Summary Signature Page     I have received my discharge instructions, and my questions have been answered. I have discussed any challenges I see with this plan with the nurse or doctor.    ..........................................................................................................................................  Patient/Patient Representative Signature      ..........................................................................................................................................  Patient Representative Print Name and Relationship to Patient    ..................................................               ................................................  Date                                   Time    ..........................................................................................................................................  Reviewed by Signature/Title    ...................................................              ..............................................  Date                                               Time          22EPIC Rev 08/18

## 2018-10-27 NOTE — ED AVS SNAPSHOT
[facility]    [address]    [place] [address]    Phone:  [phone]                                       Ramo Lockwood   MRN: [id number]    Department:  [facility]   Date of Visit:  10/27/2018           Patient Information     Date Of Birth          1950        Your diagnoses for this visit were:     Rectal bleeding     Internal hemorrhage        You were seen by Shana Mchugh MD.      Follow-up Information     Please follow up.    Why:  As needed        Discharge Instructions         Lower GI Bleeding (Stable)  You have signs of blood in your stool. This is called rectal bleeding. The bleeding may have begun in another part of your gastrointestinal (GI) tract. If the blood is bright red, it is likely coming from the lower part of the GI tract. If the blood is black or dark, it might be coming from higher up in the GI tract. Very small amounts of GI bleeding may not be visible and can only be discovered during a test on your stool. Possible causes of lower GI bleeding include:    Hemorrhoids    Anal fissures    Diverticulitis    Inflammatory bowel disease (Crohn's disease or ulcerative colitis)    Polyps (growths) in the intestine  Note: Iron supplements and medicines for diarrhea or upset stomach can cause black stools. Foods such as licorice and red beets can also discolor the stool and be mistaken for bleeding. These are not bleeding and are not a cause for alarm.  Home care  You have not lost a large amount of blood and your condition appears stable at this time. You may resume normal activity as long as you feel well.  Don't take NSAIDs, such as aspirin, ibuprofen, or naproxen. They can irritate the stomach and cause further bleeding. If you are taking these medicines for other medical reasons, talk to your healthcare provider before you stop them.   Follow-up care  Follow up with your healthcare provider as advised. Further tests may be needed to find the  cause of your bleeding.  When to seek medical advice  Call your healthcare provider right away for any of the following:    Large amount of rectal bleeding     Increasing abdominal pain    Weakness, dizziness  Call 911  Call 911 if any of the following occur:    Loss of consciousness    Vomiting blood  Date Last Reviewed: 6/24/2015 2000-2017 [employer]. [address] [place] [address]. All rights reserved. This information is not intended as a substitute for professional medical care. Always follow your healthcare professional's instructions.             Review of your medicines      Our records show that you are taking the medicines listed below. If these are incorrect, please call your family doctor or clinic.        Dose / Directions Last dose taken    nadolol 20 MG tablet   Commonly known as:  CORGARD   Dose:  20 mg   Quantity:  30 tablet        Take 1 tablet (20 mg) by mouth daily   Refills:  1        ranitidine 150 MG tablet   Commonly known as:  ZANTAC   Dose:  150 mg        Take 150 mg by mouth   Refills:  0                Procedures and tests performed during your visit     ABO/Rh type and screen    CBC with platelets differential    Comprehensive metabolic panel    INR      Orders Needing Specimen Collection     None      Pending Results     No orders found from 10/25/2018 to 10/28/2018.            Pending Culture Results     No orders found from 10/25/2018 to 10/28/2018.            Thank you for choosing [facility]       Thank you for choosing [facility] for your care. Our goal is always to provide you with excellent care. Hearing back from our patients is one way we can continue to improve our services. Please take a few minutes to complete the written survey that you may receive in the mail after you visit with us. Thank you!        relocalityhart Information     [employer] lets you send messages to your doctor, view your test results, renew your prescriptions, schedule appointments and  "more. To sign up, go to [email or web address] . Click on \"Log in\" on the left side of the screen, which will take you to the Welcome page. Then click on \"Sign up Now\" on the right side of the page.     You will be asked to enter the access code listed below, as well as some personal information. Please follow the directions to create your username and password.     Your access code is: [email or web address]  Expires: 2019  5:51 PM     Your access code will  in 90 days. If you need help or a new code, please call your [facility] clinic or [phone].        Care EveryWhere ID     This is your Care EveryWhere ID. This could be used by other organizations to access your [facility] medical records  [id number]        Equal Access to Services     RYAN SCHMID : Madi Garcia, marina rowan, abilio hodges, chuy pan. So [facility] [phone].    ATENCIÓN: Si habla español, tiene a leger disposición servicios gratuitos de asistencia lingüística. Llame al [phone].    We comply with applicable federal civil rights laws and [place] laws. We do not discriminate on the basis of race, color, national origin, age, disability, sex, sexual orientation, or gender identity.            After Visit Summary       This is your record. Keep this with you and show to your community pharmacist(s) and doctor(s) at your next visit.                  "

## 2018-10-27 NOTE — ED NOTES
Pt to room 6, accompanied by brother, call light in reach.  Rectal bleeding started this morning, also having loose stool x 2 today.  Denies pain, nausea or vomiting at this time.

## 2018-10-27 NOTE — OP NOTE
[facility]   Operative Note    Pre-operative diagnosis: Rectal Bleeding   Post-operative diagnosis Bleeding internal varice   Procedure: Anoscopy with banding of internal varice   Surgeon(s): Josr Acuña MD   Estimated blood loss: Minimal    Specimens: none   Findings: Bleeding from internal anal varix on left lateral quadrant     Description of procedure:   Patient was prepped and draped in the left lateral quadrant position. The anus was visualized and there were external hemorrhoids visible with no signs of bleeding. Digital rectal exam was performed with no anal mass identified. Anoscopy was then placed and there was large internal hemorrhoids visualized in the right posterior, right anterior and left lateral quadrant. In the left lateral quadrant there was some bleeding coming from the internal hemorrhoid. This was then grasped and a rubber band was placed. The bleeding has ceased. There was no further bleeding and no signs of bleeding proximally. The anoscopy was removed and the patient tolerated the bedside procedure without difficulty.

## 2018-10-27 NOTE — DISCHARGE INSTRUCTIONS
Lower GI Bleeding (Stable)  You have signs of blood in your stool. This is called rectal bleeding. The bleeding may have begun in another part of your gastrointestinal (GI) tract. If the blood is bright red, it is likely coming from the lower part of the GI tract. If the blood is black or dark, it might be coming from higher up in the GI tract. Very small amounts of GI bleeding may not be visible and can only be discovered during a test on your stool. Possible causes of lower GI bleeding include:    Hemorrhoids    Anal fissures    Diverticulitis    Inflammatory bowel disease (Crohn's disease or ulcerative colitis)    Polyps (growths) in the intestine  Note: Iron supplements and medicines for diarrhea or upset stomach can cause black stools. Foods such as licorice and red beets can also discolor the stool and be mistaken for bleeding. These are not bleeding and are not a cause for alarm.  Home care  You have not lost a large amount of blood and your condition appears stable at this time. You may resume normal activity as long as you feel well.  Don't take NSAIDs, such as aspirin, ibuprofen, or naproxen. They can irritate the stomach and cause further bleeding. If you are taking these medicines for other medical reasons, talk to your healthcare provider before you stop them.   Follow-up care  Follow up with your healthcare provider as advised. Further tests may be needed to find the cause of your bleeding.  When to seek medical advice  Call your healthcare provider right away for any of the following:    Large amount of rectal bleeding     Increasing abdominal pain    Weakness, dizziness  Call 911  Call 911 if any of the following occur:    Loss of consciousness    Vomiting blood  Date Last Reviewed: 6/24/2015 2000-2017 [employer]. [address] [place] [address], [place] [address]. All rights reserved. This information is not intended as a substitute for professional medical care. Always follow your  healthcare professional's instructions.

## 2018-10-27 NOTE — CONSULTS
[facility]    General Surgery Consultation    Date of Admission:  10/27/2018    Assessment & Plan   Ramo Lockwood is a 68 year old male who was admitted on 10/27/2018. I was asked to see the patient for rectal bleeding.    At this time there is a bleeding rectal varix identified on anoscopy. We will proceed with bedside anoscopy with banding of the varix. An informed consent was obtained documenting the risks including but not limited to bleeding, infection, damage to surrounding structures, pain, need for repeat or redo procedures. All questions and concerns were addressed.    The patient had banding of his bleeding internal rectal varix and has no signs of further bleeding. As he is hemodynamically stable I would recommend that he be evaluated by his gastroenterology team and a colonoscopy be performed when they do their upper endoscopy. Then from a surgical standpoint he can be discharge home.       Josr Acuña    Code Status    Prior    Reason for Consult   Reason for consult: I was asked by Shana Mchugh to evaluate this patient for rectal bleeding.    Primary Care Physician   Aníbal Dave    Chief Complaint   Rectal bleeding    History is obtained from the patient    History of Present Illness   Ramo Lockwood is a 68 year old male who presents with rectal bleeding. He was at home and noticed bleeding this morning with a bowel movement. There was a small amount of blood on the stool and in the bowl. He came in to be further evaluated. He says that he has had a similar bout of rectal bleeding one month ago with no evaluation. At that time it resolved on its own. He has no abdominal pain or tenderness. He has no issues with constipation or diarrhea. He has not had any upper GI bleeding or hematemesis.    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 He has undergone RFA for his liver tumors with resolution. He has been evaluated by the transplant team in [place]  where he is on the wait list and further evaluation in 3-6 months. His MELD was currently 17. He follows with GI at [facility] in [place] and was planned for an upper endoscopy to evaluate for varices. His last colonoscopy was one year ago in [place], [place].     Past Medical History   I have reviewed this patient's medical history and updated it with pertinent information if needed.   Past Medical History:   Diagnosis Date     Acute conjunctivitis, unspecified 11/04/2000     Chronic pain     back pain     Diabetes mellitus (H)      Gout      Hyperlipidemia      Hypertension      Special screening for malignant neoplasms, colon 02/16/2009       Past Surgical History   I have reviewed this patient's surgical history and updated it with pertinent information if needed.  Past Surgical History:   Procedure Laterality Date     COLONOSCOPY       PHACOEMULSIFICATION WITH STANDARD INTRAOCULAR LENS IMPLANT  6/10/2013    Procedure: PHACOEMULSIFICATION WITH STANDARD INTRAOCULAR LENS IMPLANT;  CATARACT EXTRACTION WITH INTRA OCLULAR LENS LEFT;  Surgeon: Benoit Philip MD;  Location: [facility]     PHACOEMULSIFICATION WITH STANDARD INTRAOCULAR LENS IMPLANT  7/23/2013    Procedure: PHACOEMULSIFICATION WITH STANDARD INTRAOCULAR LENS IMPLANT;  CATARACT EXTRACTION WITH INTRA OCULAR LENS RIGHT;  Surgeon: Benoit Philip MD;  Location: [facility]       Prior to Admission Medications   Prior to Admission Medications   Prescriptions Last Dose Informant Patient Reported? Taking?   nadolol (CORGARD) 20 MG tablet 10/26/2018 at Unknown time  No Yes   Sig: Take 1 tablet (20 mg) by mouth daily   ranitidine (ZANTAC) 150 MG tablet Past Week at Unknown time  Yes Yes   Sig: Take 150 mg by mouth      Facility-Administered Medications: None     Allergies   Allergies   Allergen Reactions     Codeine Camsylate        Social History   I have reviewed this patient's social history and updated it with pertinent information if needed. Ramo Lockwood  reports that he  has never smoked. He has never used smokeless tobacco. He reports that he does not drink alcohol or use illicit drugs.    Family History   I have reviewed this patient's family history and updated it with pertinent information if needed.   Family History   Problem Relation Age of Onset     Cardiovascular Father      MI       Review of Systems   As per hpi    Physical Exam   Temp: 97.2  F (36.2  C) Temp src: Tympanic BP: 116/52 Pulse: 71 Heart Rate: 59 Resp: 16 SpO2: 98 % O2 Device: None (Room air)    Vital Signs with Ranges  Temp:  [97.2  F (36.2  C)] 97.2  F (36.2  C)  Pulse:  [71] 71  Heart Rate:  [54-73] 59  Resp:  [16] 16  BP: (113-144)/(52-92) 116/52  SpO2:  [94 %-99 %] 98 %  0 lbs 0 oz    Constitutional: awake, alert, cooperative, no apparent distress, and appears stated age  Eyes: lids and lashes normal and pupils equal, round and reactive to light  ENT: normocepalic, without obvious abnormality  Hematologic / Lymphatic: no cervical lymphadenopathy and no supraclavicular lymphadenopathy  Respiratory: No increased work of breathing, good air exchange, clear to auscultation bilaterally, no crackles or wheezing  Cardiovascular: regular rate and rhythm and normal S1 and S2  GI: soft, non-tender, mild distension with appreciable fluid wave  Skin: no bruising or bleeding and normal skin color, texture, turgor  Musculoskeletal: no lower extremity pitting edema present  Neurologic: Awake, alert, oriented to name, place and time.      Data   Results for orders placed or performed during the hospital encounter of 10/27/18 (from the past 24 hour(s))   CBC with platelets differential   Result Value Ref Range    WBC 4.3 4.0 - 11.0 10e9/L    RBC Count 3.53 (L) 4.4 - 5.9 10e12/L    Hemoglobin 12.0 (L) 13.3 - 17.7 g/dL    Hematocrit 34.5 (L) 40.0 - 53.0 %    MCV 98 78 - 100 fl    MCH 34.0 (H) 26.5 - 33.0 pg    MCHC 34.8 31.5 - 36.5 g/dL    RDW 15.5 (H) 10.0 - 15.0 %    Platelet Count 78 (L) 150 - 450 10e9/L    Diff Method  Automated Method     % Neutrophils 70.4 %    % Lymphocytes 15.5 %    % Monocytes 9.3 %    % Eosinophils 3.7 %    % Basophils 0.9 %    % Immature Granulocytes 0.2 %    Nucleated RBCs 0 0 /100    Absolute Neutrophil 3.0 1.6 - 8.3 10e9/L    Absolute Lymphocytes 0.7 (L) 0.8 - 5.3 10e9/L    Absolute Monocytes 0.4 0.0 - 1.3 10e9/L    Absolute Eosinophils 0.2 0.0 - 0.7 10e9/L    Absolute Basophils 0.0 0.0 - 0.2 10e9/L    Abs Immature Granulocytes 0.0 0 - 0.4 10e9/L    Absolute Nucleated RBC 0.0    Comprehensive metabolic panel   Result Value Ref Range    Sodium 136 133 - 144 mmol/L    Potassium 4.4 3.4 - 5.3 mmol/L    Chloride 104 94 - 109 mmol/L    Carbon Dioxide 26 20 - 32 mmol/L    Anion Gap 6 3 - 14 mmol/L    Glucose 102 (H) 70 - 99 mg/dL    Urea Nitrogen 6 (L) 7 - 30 mg/dL    Creatinine 1.26 (H) 0.66 - 1.25 mg/dL    GFR Estimate 56 (L) >60 mL/min/1.7m2    GFR Estimate If [place] 68 >60 mL/min/1.7m2    Calcium 8.2 (L) 8.5 - 10.1 mg/dL    Bilirubin Total 3.2 (H) 0.2 - 1.3 mg/dL    Albumin 2.2 (L) 3.4 - 5.0 g/dL    Protein Total 6.8 6.8 - 8.8 g/dL    Alkaline Phosphatase 172 (H) 40 - 150 U/L    ALT 23 0 - 70 U/L    AST 48 (H) 0 - 45 U/L   INR   Result Value Ref Range    INR 1.49 (H) 0.80 - 1.20   ABO/Rh type and screen   Result Value Ref Range    ABO A     RH(D) Pos     Antibody Screen Neg     Test Valid Only At [facility]        Specimen Expires 10/30/2018

## 2018-10-27 NOTE — ED PROVIDER NOTES
eMERGENCY dEPARTMENT eNCOUnter      CHIEF COMPLAINT    Chief Complaint   Patient presents with     Rectal Bleeding       HPI    Ramo Lockwood is a 68 year old male who presents with bleeding from the rectum which started this morning. This is painless, no cramping.  States he has never had this before.   Has a history of cirrhosis.  MELD score 17 Mr. [place] has hepatocellular carcinoma.  He is on the waiting list for a liver transplant, recently diagnosed with portal vein thrombosis.  He states this morning woke up with painless rectal bleeding.  Not dizzy or lightheaded.    REVIEW OF SYSTEMS    GI: No vomiting or hematemesis  Cardiac: No chest pain or syncope  Pulmonary: No difficulty breathing or new cough  General: No fevers or chills  : No hematuria or dysuria  See HPI for further details.   All other systems reviewed and are negative.    PAST MEDICAL & SURGICAL HISTORY    Past Medical History:   Diagnosis Date     Acute conjunctivitis, unspecified 11/04/2000     Chronic pain     back pain     Diabetes mellitus (H)      Gout      Hyperlipidemia      Hypertension      Special screening for malignant neoplasms, colon 02/16/2009     Past Surgical History:   Procedure Laterality Date     COLONOSCOPY       PHACOEMULSIFICATION WITH STANDARD INTRAOCULAR LENS IMPLANT  6/10/2013    Procedure: PHACOEMULSIFICATION WITH STANDARD INTRAOCULAR LENS IMPLANT;  CATARACT EXTRACTION WITH INTRA OCLULAR LENS LEFT;  Surgeon: Benoit Philip MD;  Location: [facility]     PHACOEMULSIFICATION WITH STANDARD INTRAOCULAR LENS IMPLANT  7/23/2013    Procedure: PHACOEMULSIFICATION WITH STANDARD INTRAOCULAR LENS IMPLANT;  CATARACT EXTRACTION WITH INTRA OCULAR LENS RIGHT;  Surgeon: Benoit Philip MD;  Location: [facility]       CURRENT MEDICATIONS    Current Outpatient Rx   Medication Sig Dispense Refill     nadolol (CORGARD) 20 MG tablet Take 1 tablet (20 mg) by mouth daily 30 tablet 1     ranitidine (ZANTAC) 150 MG tablet Take 150 mg by mouth          ALLERGIES    Allergies   Allergen Reactions     Codeine Camsylate        SOCIAL AND FAMILY HISTORY    Social History     Social History     Marital status:      Spouse name: N/A     Number of children: N/A     Years of education: N/A     Social History Main Topics     Smoking status: Never Smoker     Smokeless tobacco: Never Used     Alcohol use No      Comment: H/O abuse and treatment, sober for 2 yrs.      Drug use: No     Sexual activity: Not Currently     Other Topics Concern     Parent/Sibling W/ Cabg, Mi Or Angioplasty Before 65f 55m? No     Social History Narrative     Family History   Problem Relation Age of Onset     Cardiovascular Father      MI       PHYSICAL EXAM    VITAL SIGNS: /92  Pulse 71  Temp 97.2  F (36.2  C) (Tympanic)  Resp 16  SpO2 95%  Constitutional:  Well developed, well nourished  Eyes:  Sclera nonicteric, conjunctiva moist  HENT:  Atraumatic, nose normal  Neck: Supple, no JVD  Respiratory:  No retractions, no accessory muscle use, normal breath sounds   Cardiovascular:  regular rate, normal rhythm, no murmurs  GI:  no abdominal tenderness, no guarding, bowel sounds present  Rectal: has several external hemmorhoids which are not thrombosed or actively bleeding. Rectal exam shows active bleeding.    Musculoskeletal:  No edema, no acute deformity  Integument: No rash, dry skin  Neurologic:  Alert & oriented, no slurred speech  Psychiatric: Cooperative, pleasant affect       ED COURSE & MEDICAL DECISION MAKING    Pertinent Labs & Imaging studies reviewed and interpreted. (See chart for details)     See chart for details of medications given during the ED stay.  Vitals:    10/27/18 1252   BP: 144/92   Pulse: 71   Resp: 16   Temp: 97.2  F (36.2  C)   TempSrc: Tympanic   SpO2: 95%       FINAL IMPRESSION    Rectal bleeding    PLAN  Dr. Acuña is consulted and here to see the patient.  I did speak to GI at [facility], on call for Dr. Mendoza.  Likely sources are rectal  varices/hemorhoids.      Adden: Dr. Acuña located a bleeding internal hemorrhoid, which he banded in the emergency department.  The patient tolerated well and was discharged to home.          Shana Mchugh MD  10/27/18 1632       Shana Mchugh MD  10/27/18 1954

## 2018-12-20 ENCOUNTER — HOSPITAL ENCOUNTER (OUTPATIENT)
Dept: ULTRASOUND IMAGING | Facility: HOSPITAL | Age: 68
Discharge: HOME | End: 2018-12-20
Attending: FAMILY MEDICINE | Admitting: FAMILY MEDICINE
Payer: MEDICARE

## 2018-12-20 VITALS
OXYGEN SATURATION: 97 % | RESPIRATION RATE: 16 BRPM | SYSTOLIC BLOOD PRESSURE: 127 MMHG | DIASTOLIC BLOOD PRESSURE: 99 MMHG

## 2018-12-20 DIAGNOSIS — K70.31 ASCITES DUE TO ALCOHOLIC CIRRHOSIS (H): ICD-10-CM

## 2018-12-20 LAB
APPEARANCE FLD: NORMAL
COLOR FLD: YELLOW
INR PPP: 1.49 (ref 0.8–1.2)
MONOS+MACROS NFR FLD MANUAL: 91 %
NEUTS BAND NFR FLD MANUAL: 9 %
PLATELET # BLD AUTO: 65 10E9/L (ref 150–450)
RBC # FLD: 882 /UL
SPECIMEN SOURCE FLD: NORMAL
WBC # FLD AUTO: 54 /UL

## 2018-12-20 PROCEDURE — 85610 PROTHROMBIN TIME: CPT | Performed by: RADIOLOGY

## 2018-12-20 PROCEDURE — 85049 AUTOMATED PLATELET COUNT: CPT | Performed by: RADIOLOGY

## 2018-12-20 PROCEDURE — 49083 ABD PARACENTESIS W/IMAGING: CPT | Mod: TC

## 2018-12-20 PROCEDURE — 25000125 ZZHC RX 250: Performed by: RADIOLOGY

## 2018-12-20 PROCEDURE — 87070 CULTURE OTHR SPECIMN AEROBIC: CPT | Performed by: RADIOLOGY

## 2018-12-20 PROCEDURE — 89051 BODY FLUID CELL COUNT: CPT | Performed by: RADIOLOGY

## 2018-12-20 PROCEDURE — 87205 SMEAR GRAM STAIN: CPT | Performed by: RADIOLOGY

## 2018-12-20 RX ORDER — NICOTINE POLACRILEX 4 MG
15-30 LOZENGE BUCCAL
Status: CANCELLED | OUTPATIENT
Start: 2018-12-20

## 2018-12-20 RX ORDER — DEXTROSE MONOHYDRATE 25 G/50ML
25-50 INJECTION, SOLUTION INTRAVENOUS
Status: DISCONTINUED | OUTPATIENT
Start: 2018-12-20 | End: 2018-12-21 | Stop reason: HOSPADM

## 2018-12-20 RX ORDER — DEXTROSE MONOHYDRATE 25 G/50ML
25-50 INJECTION, SOLUTION INTRAVENOUS
Status: CANCELLED | OUTPATIENT
Start: 2018-12-20

## 2018-12-20 RX ORDER — NICOTINE POLACRILEX 4 MG
15-30 LOZENGE BUCCAL
Status: DISCONTINUED | OUTPATIENT
Start: 2018-12-20 | End: 2018-12-21 | Stop reason: HOSPADM

## 2018-12-20 RX ADMIN — LIDOCAINE HYDROCHLORIDE 5 ML: 10 INJECTION, SOLUTION EPIDURAL; INFILTRATION; INTRACAUDAL; PERINEURAL at 12:18

## 2018-12-20 NOTE — PROGRESS NOTES
There were  no complications and patient has no symptoms..      Tolerated procedure well.    Patient to US for Paracentesis.  Positioned supine on table.  Draining clear nick fluid.  Fluid is to be sent to lab. 5150 ml total fld drained.   Pt tolerated the procedure well.      Discharge instructions given to pt.    Radiologist:Dr. Macias    Time Out: Prior to the start of the procedure and with procedural staff participation, I verbally confirmed the patient s identity using two indicators, relevant allergies, that the procedure was appropriate and matched the consent or emergent situation, and that the correct equipment/implants were available. Immediately prior to starting the procedure I conducted the Time Out with the procedural staff and re-confirmed the patient s name, procedure, and site/side. (The Joint Commission universal protocol was followed.)  Yes    Position:  supine    Pain:  denies    Sedation:None. Local Anesthestic used  No sedation    Estimated Blood Loss: Less than 50 ml, pressure applied for 10 minutes after catheter removed.       Condition: Stable    Comments: See dictated procedure note for full details     Carole Ingram RN

## 2018-12-20 NOTE — IP AVS SNAPSHOT
MRN:[id number]                      After Visit Summary   12/20/2018    Ramo Lockwood    MRN: [id number]           Visit Information        Provider Department      12/20/2018 12:00 PM [facility]; [facility]; [facility]TRAS[facility]           Review of your medicines      UNREVIEWED medicines. Ask your doctor about these medicines       Dose / Directions   nadolol 20 MG tablet  Commonly known as:  CORGARD  Used for:  Alcoholic cirrhosis of liver (H)      Dose:  20 mg  Take 1 tablet (20 mg) by mouth daily  Quantity:  30 tablet  Refills:  1     ranitidine 150 MG tablet  Commonly known as:  ZANTAC      Dose:  150 mg  Take 150 mg by mouth  Refills:  0              Protect others around you: Learn how to safely use, store and throw away your medicines at www.disposemymeds.org.       Follow-ups after your visit       Care Instructions       Further instructions from your care team           DISCHARGE INSTRUCTIONS  Paracentesis      IMPORTANT: As you prepare for discharge, the following information will help you return to your best level of health.       This Information Is About Your Follow Up Care  Call your doctor if you do not get better. Call sooner if you feel worse. You can reach your doctor by calling their clinic phone number.                                    This Information Is About Procedures      PARACENTESIS  This procedure involved the insertion of a needle into the space between your abdominal organs and the membrane that surrounds them (peritoneal space). It was done to get a sample of the fluid or to remove excess fluid.    Call your doctor if you have:    any drainage from the procedure site.    any redness, swelling, pain or pus around the procedure site.    swelling in your scrotum.    any new or severe symptoms.                        This Information Is About Your Illness and Diagnosis    ASCITES    Ascites is extra fluid in your abdomen. The lining inside your abdomen is called the  "\"peritoneum\". The peritoneum is made up of two thin layers. One layer is attached to the wall of the abdomen. The other layer is wrapped around the abdominal organs.  These layers usually slide right next to each other, without extra fluid. With ascites, fluid builds up in the space between these layers.    Causes of ascites may include:    Cirrhosis of the liver (most common cause)    A blood clot in the vein of the liver    Congestive heart failure    Inflammation of the sac around the heart    Chronic hepatitis    Pancreatitis    Liver cancer    Nephrotic syndrome    Poor nutrition    Cancer in the abdomen    Chronic hemodialysis    Infection in the abdomen    When you go home, follow these instructions:    Take your medications as scheduled.    Follow a low sodium diet if it is ordered for you.     Try using herbs and other flavorings, like lemon juice, to season your food.    Avoid canned and prepackaged foods.    Do not add salt to any of your food, either while cooking or while eating.    Avoid salty snacks like chips, nuts, and crackers.  Try fresh fruit or vegetables instead.    Follow instructions given to you by your dietician at the hospital.    Sleep with your head elevated (on several pillows or with wood blocks under the head of the bed) if breathing is hard for you when lying flat.    Eat small frequent meals if you get full easily.    Keep follow up appointments with your doctor.     Call your doctor if:    You notice your clothes are getting tighter around the waist.    Your weight is increasing.    You are more short of breath than usual.    You are having more discomfort in your abdomen.    You have trouble following your diet.    You have any other questions or concerns.                                  Additional Information About Your Visit       Care EveryWhere ID    This is your Care EveryWhere ID. This could be used by other organizations to access your [facility] medical " records  [id number]       Your Vitals Were     Blood Pressure   127/99      Respirations   16    Pulse Oximetry   97%            Primary Care Provider Office Phone # Fax #    Aníbal Dave [phone]      Equal Access to Services    RYAN SCHMID : Hadii aad ku hadarsalano Soomaali, waaxda luqadaha, qaybta kaalmada adeegyada, waxjasmina breenin antonn adehaydee gu lajackchristiano . So [facility] [phone].    ATENCIÓN: Si habla español, tiene a leger disposición servicios gratuitos de asistencia lingüística. [facility] [phone].    We comply with applicable federal civil rights laws and [place] laws. We do not discriminate on the basis of race, color, national origin, age, disability, sex, sexual orientation, or gender identity.           Thank you!    Thank you for choosing [facility] for your care. Our goal is always to provide you with excellent care. Hearing back from our patients is one way we can continue to improve our services. Please take a few minutes to complete the written survey that you may receive in the mail after you visit with us. Thank you!            Medication List      ASK your doctor about these medications          Morning Afternoon Evening Bedtime As Needed    nadolol 20 MG tablet  Commonly known as:  CORGARD  Take 1 tablet (20 mg) by mouth daily                     ranitidine 150 MG tablet  Commonly known as:  ZANTAC  Take 150 mg by mouth

## 2018-12-20 NOTE — DISCHARGE INSTRUCTIONS
"    DISCHARGE INSTRUCTIONS  Paracentesis      IMPORTANT: As you prepare for discharge, the following information will help you return to your best level of health.       This Information Is About Your Follow Up Care  Call your doctor if you do not get better. Call sooner if you feel worse. You can reach your doctor by calling their clinic phone number.                                    This Information Is About Procedures      PARACENTESIS  This procedure involved the insertion of a needle into the space between your abdominal organs and the membrane that surrounds them (peritoneal space). It was done to get a sample of the fluid or to remove excess fluid.    Call your doctor if you have:    any drainage from the procedure site.    any redness, swelling, pain or pus around the procedure site.    swelling in your scrotum.    any new or severe symptoms.                        This Information Is About Your Illness and Diagnosis    ASCITES    Ascites is extra fluid in your abdomen. The lining inside your abdomen is called the \"peritoneum\". The peritoneum is made up of two thin layers. One layer is attached to the wall of the abdomen. The other layer is wrapped around the abdominal organs.  These layers usually slide right next to each other, without extra fluid. With ascites, fluid builds up in the space between these layers.    Causes of ascites may include:    Cirrhosis of the liver (most common cause)    A blood clot in the vein of the liver    Congestive heart failure    Inflammation of the sac around the heart    Chronic hepatitis    Pancreatitis    Liver cancer    Nephrotic syndrome    Poor nutrition    Cancer in the abdomen    Chronic hemodialysis    Infection in the abdomen    When you go home, follow these instructions:    Take your medications as scheduled.    Follow a low sodium diet if it is ordered for you.     Try using herbs and other flavorings, like lemon juice, to season your food.    Avoid canned " and prepackaged foods.    Do not add salt to any of your food, either while cooking or while eating.    Avoid salty snacks like chips, nuts, and crackers.  Try fresh fruit or vegetables instead.    Follow instructions given to you by your dietician at the hospital.    Sleep with your head elevated (on several pillows or with wood blocks under the head of the bed) if breathing is hard for you when lying flat.    Eat small frequent meals if you get full easily.    Keep follow up appointments with your doctor.     Call your doctor if:    You notice your clothes are getting tighter around the waist.    Your weight is increasing.    You are more short of breath than usual.    You are having more discomfort in your abdomen.    You have trouble following your diet.    You have any other questions or concerns.

## 2018-12-20 NOTE — IP AVS SNAPSHOT
[facility]  [address] [place] [address]  Phone:  [phone]                                    After Visit Summary   12/20/2018    Ramo Lockwood    MRN: [id number]           After Visit Summary Signature Page    I have received my discharge instructions, and my questions have been answered. I have discussed any challenges I see with this plan with the nurse or doctor.    ..........................................................................................................................................  Patient/Patient Representative Signature      ..........................................................................................................................................  Patient Representative Print Name and Relationship to Patient    ..................................................               ................................................  Date                                   Time    ..........................................................................................................................................  Reviewed by Signature/Title    ...................................................              ..............................................  Date                                               Time          22EPIC Rev 08/18

## 2018-12-21 ENCOUNTER — TELEPHONE (OUTPATIENT)
Dept: INTERVENTIONAL RADIOLOGY/VASCULAR | Facility: HOSPITAL | Age: 68
End: 2018-12-21

## 2018-12-21 LAB
GRAM STN SPEC: NORMAL
GRAM STN SPEC: NORMAL
SPECIMEN SOURCE: NORMAL

## 2018-12-27 LAB
BACTERIA SPEC CULT: NORMAL
SPECIMEN SOURCE: NORMAL

## 2019-01-22 ENCOUNTER — APPOINTMENT (OUTPATIENT)
Dept: LAB | Facility: HOSPITAL | Age: 69
End: 2019-01-22
Attending: FAMILY MEDICINE
Payer: MEDICARE

## 2019-01-22 ENCOUNTER — HOSPITAL ENCOUNTER (OUTPATIENT)
Dept: ULTRASOUND IMAGING | Facility: HOSPITAL | Age: 69
Discharge: HOME | End: 2019-01-22
Attending: FAMILY MEDICINE | Admitting: FAMILY MEDICINE
Payer: MEDICARE

## 2019-01-22 VITALS
DIASTOLIC BLOOD PRESSURE: 78 MMHG | RESPIRATION RATE: 16 BRPM | SYSTOLIC BLOOD PRESSURE: 116 MMHG | OXYGEN SATURATION: 98 %

## 2019-01-22 DIAGNOSIS — R18.8 OTHER ASCITES: ICD-10-CM

## 2019-01-22 DIAGNOSIS — K70.31 ALCOHOLIC CIRRHOSIS OF LIVER WITH ASCITES (H): ICD-10-CM

## 2019-01-22 LAB
APPEARANCE FLD: NORMAL
APTT PPP: 41 SEC (ref 24–37)
COLOR FLD: YELLOW
INR PPP: 1.37 (ref 0.8–1.2)
MONOS+MACROS NFR FLD MANUAL: 86 %
NEUTS BAND NFR FLD MANUAL: 14 %
RBC # FLD: 175 /UL
SPECIMEN SOURCE FLD: NORMAL
WBC # FLD AUTO: 107 /UL

## 2019-01-22 PROCEDURE — 87205 SMEAR GRAM STAIN: CPT | Performed by: RADIOLOGY

## 2019-01-22 PROCEDURE — 89051 BODY FLUID CELL COUNT: CPT | Performed by: RADIOLOGY

## 2019-01-22 PROCEDURE — 36415 COLL VENOUS BLD VENIPUNCTURE: CPT | Performed by: RADIOLOGY

## 2019-01-22 PROCEDURE — 85610 PROTHROMBIN TIME: CPT | Performed by: RADIOLOGY

## 2019-01-22 PROCEDURE — 87070 CULTURE OTHR SPECIMN AEROBIC: CPT | Performed by: RADIOLOGY

## 2019-01-22 PROCEDURE — P9047 ALBUMIN (HUMAN), 25%, 50ML: HCPCS

## 2019-01-22 PROCEDURE — 27210238 US PARACENTESIS: Mod: TC

## 2019-01-22 PROCEDURE — 25000128 H RX IP 250 OP 636

## 2019-01-22 PROCEDURE — 85730 THROMBOPLASTIN TIME PARTIAL: CPT | Performed by: RADIOLOGY

## 2019-01-22 PROCEDURE — 25000125 ZZHC RX 250

## 2019-01-22 RX ORDER — ALBUMIN (HUMAN) 12.5 G/50ML
50 SOLUTION INTRAVENOUS ONCE
Status: CANCELLED | OUTPATIENT
Start: 2019-01-22 | End: 2019-01-22

## 2019-01-22 RX ORDER — LIDOCAINE HYDROCHLORIDE 10 MG/ML
INJECTION, SOLUTION EPIDURAL; INFILTRATION; INTRACAUDAL; PERINEURAL
Status: COMPLETED
Start: 2019-01-22 | End: 2019-01-22

## 2019-01-22 RX ORDER — ALBUMIN (HUMAN) 12.5 G/50ML
SOLUTION INTRAVENOUS
Status: COMPLETED
Start: 2019-01-22 | End: 2019-01-22

## 2019-01-22 RX ORDER — ALBUMIN (HUMAN) 12.5 G/50ML
50 SOLUTION INTRAVENOUS ONCE
Status: COMPLETED | OUTPATIENT
Start: 2019-01-22 | End: 2019-01-22

## 2019-01-22 RX ADMIN — LIDOCAINE HYDROCHLORIDE 5 ML: 10 INJECTION, SOLUTION EPIDURAL; INFILTRATION; INTRACAUDAL at 13:14

## 2019-01-22 RX ADMIN — ALBUMIN (HUMAN) 50 G: 12.5 SOLUTION INTRAVENOUS at 13:15

## 2019-01-22 RX ADMIN — ALBUMIN HUMAN 50 G: 0.25 SOLUTION INTRAVENOUS at 13:15

## 2019-01-22 NOTE — IP AVS SNAPSHOT
[facility]  [address] [place] [address]  Phone:  [phone]                                    After Visit Summary   1/22/2019    Ramo Lockwood    MRN: [id number]           After Visit Summary Signature Page    I have received my discharge instructions, and my questions have been answered. I have discussed any challenges I see with this plan with the nurse or doctor.    ..........................................................................................................................................  Patient/Patient Representative Signature      ..........................................................................................................................................  Patient Representative Print Name and Relationship to Patient    ..................................................               ................................................  Date                                   Time    ..........................................................................................................................................  Reviewed by Signature/Title    ...................................................              ..............................................  Date                                               Time          22EPIC Rev 08/18

## 2019-01-22 NOTE — PROGRESS NOTES
"There were  no complications and patient has no symptoms..      Tolerated procedure well.    Patient to US for Paracentesis.  Positioned supine on table.  Draining clear nick fluid.  Fluid is to be sent to lab. 6725 ml total fld drained.   Pt tolerated the procedure well.  After sitting up after the procedure patient noted some light headedness.  Blood pressure 120/76.  Patient admits he hasnt eaten today because of \"Digestive problems\" that he was at Dr. KUMAR Lutz' office for today.  Provided orange juice and patient sat for about 5 minutes.  Denies any further dizziness or light headedness.    Discharge instructions given to pt.    Radiologist:Dr Haq    Time Out: Prior to the start of the procedure and with procedural staff participation, I verbally confirmed the patient s identity using two indicators, relevant allergies, that the procedure was appropriate and matched the consent or emergent situation, and that the correct equipment/implants were available. Immediately prior to starting the procedure I conducted the Time Out with the procedural staff and re-confirmed the patient s name, procedure, and site/side. (The Joint Commission universal protocol was followed.)  Yes    Position:  supine    Pain:  0    Sedation:None. Local Anesthestic used  No sedation    Estimated Blood Loss: None     Condition: Stable    Comments: See dictated procedure note for full details     Carole Ingram RN          "

## 2019-01-22 NOTE — IP AVS SNAPSHOT
MRN:[id number]                      After Visit Summary   1/22/2019    Ramo Lockwood    MRN: [id number]           Visit Information        Provider Department      1/22/2019  1:00 PM [facility]; [facility]; [facility]           Review of your medicines      UNREVIEWED medicines. Ask your doctor about these medicines       Dose / Directions   nadolol 20 MG tablet  Commonly known as:  CORGARD  Used for:  Alcoholic cirrhosis of liver (H)      Dose:  20 mg  Take 1 tablet (20 mg) by mouth daily  Quantity:  30 tablet  Refills:  1     ranitidine 150 MG tablet  Commonly known as:  ZANTAC      Dose:  150 mg  Take 150 mg by mouth  Refills:  0              Protect others around you: Learn how to safely use, store and throw away your medicines at www.disposemymeds.org.       Follow-ups after your visit       Care Instructions       Further instructions from your care team           DISCHARGE INSTRUCTIONS  Paracentesis      IMPORTANT: As you prepare for discharge, the following information will help you return to your best level of health.       This Information Is About Your Follow Up Care  Call your doctor if you do not get better. Call sooner if you feel worse. You can reach your doctor by calling their clinic phone number.                                    This Information Is About Procedures      PARACENTESIS  This procedure involved the insertion of a needle into the space between your abdominal organs and the membrane that surrounds them (peritoneal space). It was done to get a sample of the fluid or to remove excess fluid.    Call your doctor if you have:    any drainage from the procedure site.    any redness, swelling, pain or pus around the procedure site.    swelling in your scrotum.    any new or severe symptoms.                        This Information Is About Your Illness and Diagnosis    ASCITES    Ascites is extra fluid in your abdomen. The lining inside your abdomen is called the  "\"peritoneum\". The peritoneum is made up of two thin layers. One layer is attached to the wall of the abdomen. The other layer is wrapped around the abdominal organs.  These layers usually slide right next to each other, without extra fluid. With ascites, fluid builds up in the space between these layers.    Causes of ascites may include:    Cirrhosis of the liver (most common cause)    A blood clot in the vein of the liver    Congestive heart failure    Inflammation of the sac around the heart    Chronic hepatitis    Pancreatitis    Liver cancer    Nephrotic syndrome    Poor nutrition    Cancer in the abdomen    Chronic hemodialysis    Infection in the abdomen    When you go home, follow these instructions:    Take your medications as scheduled.    Follow a low sodium diet if it is ordered for you.     Try using herbs and other flavorings, like lemon juice, to season your food.    Avoid canned and prepackaged foods.    Do not add salt to any of your food, either while cooking or while eating.    Avoid salty snacks like chips, nuts, and crackers.  Try fresh fruit or vegetables instead.    Follow instructions given to you by your dietician at the hospital.    Sleep with your head elevated (on several pillows or with wood blocks under the head of the bed) if breathing is hard for you when lying flat.    Eat small frequent meals if you get full easily.    Keep follow up appointments with your doctor.     Call your doctor if:    You notice your clothes are getting tighter around the waist.    Your weight is increasing.    You are more short of breath than usual.    You are having more discomfort in your abdomen.    You have trouble following your diet.    You have any other questions or concerns.                                  Additional Information About Your Visit       Care EveryWhere ID    This is your Care EveryWhere ID. This could be used by other organizations to access your [facility] medical " records  [id number]       Your Vitals Were     Blood Pressure   116/78    Respirations   16    Pulse Oximetry   98%            Primary Care Provider Office Phone # Fax #    Aníbal Dave [phone]      Equal Access to Services    RYAN SCHMID : Hadii aad ku hadarsalano Soomaali, waaxda luqadaha, qaybta kaalmada adeegyada, waxjasmina waltonn adehaydee gu lajackchristiano . So [facility] [phone].    ATENCIÓN: Si habla español, tiene a leger disposición servicios gratuitos de asistencia lingüística. [facility] [phone].    We comply with applicable federal civil rights laws and [place] laws. We do not discriminate on the basis of race, color, national origin, age, disability, sex, sexual orientation, or gender identity.           Thank you!    Thank you for choosing [facility] for your care. Our goal is always to provide you with excellent care. Hearing back from our patients is one way we can continue to improve our services. Please take a few minutes to complete the written survey that you may receive in the mail after you visit with us. Thank you!            Medication List      ASK your doctor about these medications          Morning Afternoon Evening Bedtime As Needed    nadolol 20 MG tablet  Also known as:  CORGARD  INSTRUCTIONS:  Take 1 tablet (20 mg) by mouth daily                     ranitidine 150 MG tablet  Also known as:  ZANTAC  INSTRUCTIONS:  Take 150 mg by mouth

## 2019-01-23 LAB
GRAM STN SPEC: NORMAL
SPECIMEN SOURCE: NORMAL

## 2019-01-29 LAB
BACTERIA SPEC CULT: NO GROWTH
SPECIMEN SOURCE: NORMAL

## 2019-05-21 ENCOUNTER — HOSPITAL ENCOUNTER (EMERGENCY)
Facility: HOSPITAL | Age: 69
Discharge: HOME | End: 2019-05-22
Attending: INTERNAL MEDICINE | Admitting: INTERNAL MEDICINE
Payer: MEDICARE

## 2019-05-21 DIAGNOSIS — K59.00 CONSTIPATION, UNSPECIFIED CONSTIPATION TYPE: ICD-10-CM

## 2019-05-21 PROCEDURE — 99283 EMERGENCY DEPT VISIT LOW MDM: CPT | Mod: 25

## 2019-05-21 PROCEDURE — 99283 EMERGENCY DEPT VISIT LOW MDM: CPT

## 2019-05-21 PROCEDURE — 99283 EMERGENCY DEPT VISIT LOW MDM: CPT | Mod: Z6 | Performed by: INTERNAL MEDICINE

## 2019-05-21 RX ORDER — ASPIRIN 81 MG/1
81 TABLET, CHEWABLE ORAL DAILY
COMMUNITY

## 2019-05-21 RX ORDER — MYCOPHENOLATE MOFETIL 500 MG/1
4000 TABLET ORAL 2 TIMES DAILY
COMMUNITY
End: 2019-06-25

## 2019-05-21 RX ORDER — ACYCLOVIR 400 MG/1
400 TABLET ORAL 2 TIMES DAILY
COMMUNITY
End: 2020-02-01

## 2019-05-21 RX ORDER — PREDNISONE 5 MG/1
5 TABLET ORAL AT BEDTIME
COMMUNITY
End: 2020-02-01

## 2019-05-21 NOTE — ED AVS SNAPSHOT
[facility]  [address] [address]  Phone:  [phone]                                    Ramo Lockwood   MRN: [id number]    Department:  [facility]   Date of Visit:  5/21/2019           After Visit Summary Signature Page    I have received my discharge instructions, and my questions have been answered. I have discussed any challenges I see with this plan with the nurse or doctor.    ..........................................................................................................................................  Patient/Patient Representative Signature      ..........................................................................................................................................  Patient Representative Print Name and Relationship to Patient    ..................................................               ................................................  Date                                   Time    ..........................................................................................................................................  Reviewed by Signature/Title    ...................................................              ..............................................  Date                                               Time          22EPIC Rev 08/18

## 2019-05-22 ENCOUNTER — APPOINTMENT (OUTPATIENT)
Dept: ULTRASOUND IMAGING | Facility: HOSPITAL | Age: 69
End: 2019-05-22
Attending: PHYSICIAN ASSISTANT
Payer: MEDICARE

## 2019-05-22 ENCOUNTER — HOSPITAL ENCOUNTER (EMERGENCY)
Facility: HOSPITAL | Age: 69
Discharge: HOME | End: 2019-05-22
Attending: PHYSICIAN ASSISTANT | Admitting: PHYSICIAN ASSISTANT
Payer: MEDICARE

## 2019-05-22 ENCOUNTER — APPOINTMENT (OUTPATIENT)
Dept: GENERAL RADIOLOGY | Facility: HOSPITAL | Age: 69
End: 2019-05-22
Attending: PHYSICIAN ASSISTANT
Payer: MEDICARE

## 2019-05-22 VITALS
RESPIRATION RATE: 18 BRPM | OXYGEN SATURATION: 100 % | TEMPERATURE: 97.6 F | DIASTOLIC BLOOD PRESSURE: 88 MMHG | SYSTOLIC BLOOD PRESSURE: 131 MMHG

## 2019-05-22 VITALS
BODY MASS INDEX: 22.96 KG/M2 | HEART RATE: 88 BPM | SYSTOLIC BLOOD PRESSURE: 125 MMHG | DIASTOLIC BLOOD PRESSURE: 83 MMHG | WEIGHT: 160 LBS | OXYGEN SATURATION: 97 % | RESPIRATION RATE: 17 BRPM | TEMPERATURE: 100.3 F

## 2019-05-22 DIAGNOSIS — Z94.4 STATUS POST LIVER TRANSPLANTATION (H): ICD-10-CM

## 2019-05-22 LAB
ALBUMIN SERPL-MCNC: 2.8 G/DL (ref 3.4–5)
ALBUMIN UR-MCNC: 10 MG/DL
ALP SERPL-CCNC: 87 U/L (ref 40–150)
ALT SERPL W P-5'-P-CCNC: 20 U/L (ref 0–70)
ANION GAP SERPL CALCULATED.3IONS-SCNC: 5 MMOL/L (ref 3–14)
APPEARANCE UR: CLEAR
AST SERPL W P-5'-P-CCNC: 6 U/L (ref 0–45)
BACTERIA #/AREA URNS HPF: ABNORMAL /HPF
BASOPHILS # BLD AUTO: 0 10E9/L (ref 0–0.2)
BASOPHILS NFR BLD AUTO: 0.3 %
BILIRUB SERPL-MCNC: 0.7 MG/DL (ref 0.2–1.3)
BILIRUB UR QL STRIP: NEGATIVE
BUN SERPL-MCNC: 14 MG/DL (ref 7–30)
CALCIUM SERPL-MCNC: 8.6 MG/DL (ref 8.5–10.1)
CHLORIDE SERPL-SCNC: 99 MMOL/L (ref 94–109)
CO2 SERPL-SCNC: 27 MMOL/L (ref 20–32)
COLOR UR AUTO: ABNORMAL
CREAT SERPL-MCNC: 1.37 MG/DL (ref 0.66–1.25)
DIFFERENTIAL METHOD BLD: ABNORMAL
EOSINOPHIL # BLD AUTO: 0.5 10E9/L (ref 0–0.7)
EOSINOPHIL NFR BLD AUTO: 6.7 %
ERYTHROCYTE [DISTWIDTH] IN BLOOD BY AUTOMATED COUNT: 16.8 % (ref 10–15)
GFR SERPL CREATININE-BSD FRML MDRD: 51 ML/MIN/{1.73_M2}
GLUCOSE SERPL-MCNC: 114 MG/DL (ref 70–99)
GLUCOSE UR STRIP-MCNC: NEGATIVE MG/DL
HCT VFR BLD AUTO: 24.7 % (ref 40–53)
HGB BLD-MCNC: 8.7 G/DL (ref 13.3–17.7)
HGB UR QL STRIP: NEGATIVE
IMM GRANULOCYTES # BLD: 0 10E9/L (ref 0–0.4)
IMM GRANULOCYTES NFR BLD: 0.3 %
KETONES UR STRIP-MCNC: NEGATIVE MG/DL
LACTATE BLD-SCNC: 0.7 MMOL/L (ref 0.7–2)
LEUKOCYTE ESTERASE UR QL STRIP: NEGATIVE
LIPASE SERPL-CCNC: 85 U/L (ref 73–393)
LYMPHOCYTES # BLD AUTO: 0.2 10E9/L (ref 0.8–5.3)
LYMPHOCYTES NFR BLD AUTO: 2.5 %
MCH RBC QN AUTO: 33 PG (ref 26.5–33)
MCHC RBC AUTO-ENTMCNC: 35.2 G/DL (ref 31.5–36.5)
MCV RBC AUTO: 94 FL (ref 78–100)
MONOCYTES # BLD AUTO: 0.6 10E9/L (ref 0–1.3)
MONOCYTES NFR BLD AUTO: 9.3 %
NEUTROPHILS # BLD AUTO: 5.6 10E9/L (ref 1.6–8.3)
NEUTROPHILS NFR BLD AUTO: 80.9 %
NITRATE UR QL: NEGATIVE
NRBC # BLD AUTO: 0 10*3/UL
NRBC BLD AUTO-RTO: 0 /100
PH UR STRIP: 5.5 PH (ref 4.7–8)
PLATELET # BLD AUTO: 104 10E9/L (ref 150–450)
POTASSIUM SERPL-SCNC: 4.5 MMOL/L (ref 3.4–5.3)
PROT SERPL-MCNC: 6.1 G/DL (ref 6.8–8.8)
RBC # BLD AUTO: 2.64 10E12/L (ref 4.4–5.9)
RBC #/AREA URNS AUTO: <1 /HPF (ref 0–2)
SODIUM SERPL-SCNC: 131 MMOL/L (ref 133–144)
SOURCE: ABNORMAL
SP GR UR STRIP: 1.01 (ref 1–1.03)
UROBILINOGEN UR STRIP-MCNC: NORMAL MG/DL (ref 0–2)
WBC # BLD AUTO: 6.9 10E9/L (ref 4–11)
WBC #/AREA URNS AUTO: <1 /HPF (ref 0–5)

## 2019-05-22 PROCEDURE — 25000132 ZZH RX MED GY IP 250 OP 250 PS 637: Mod: GY | Performed by: INTERNAL MEDICINE

## 2019-05-22 PROCEDURE — A9270 NON-COVERED ITEM OR SERVICE: HCPCS | Performed by: INTERNAL MEDICINE

## 2019-05-22 PROCEDURE — 93976 VASCULAR STUDY: CPT | Mod: TC

## 2019-05-22 PROCEDURE — 36415 COLL VENOUS BLD VENIPUNCTURE: CPT | Performed by: PHYSICIAN ASSISTANT

## 2019-05-22 PROCEDURE — 81001 URINALYSIS AUTO W/SCOPE: CPT | Performed by: PHYSICIAN ASSISTANT

## 2019-05-22 PROCEDURE — 85025 COMPLETE CBC W/AUTO DIFF WBC: CPT | Performed by: PHYSICIAN ASSISTANT

## 2019-05-22 PROCEDURE — 83605 ASSAY OF LACTIC ACID: CPT | Performed by: PHYSICIAN ASSISTANT

## 2019-05-22 PROCEDURE — 71046 X-RAY EXAM CHEST 2 VIEWS: CPT | Mod: TC

## 2019-05-22 PROCEDURE — 80197 ASSAY OF TACROLIMUS: CPT | Performed by: PHYSICIAN ASSISTANT

## 2019-05-22 PROCEDURE — 80053 COMPREHEN METABOLIC PANEL: CPT | Performed by: PHYSICIAN ASSISTANT

## 2019-05-22 PROCEDURE — 99285 EMERGENCY DEPT VISIT HI MDM: CPT | Mod: 25

## 2019-05-22 PROCEDURE — 99285 EMERGENCY DEPT VISIT HI MDM: CPT | Mod: Z6 | Performed by: PHYSICIAN ASSISTANT

## 2019-05-22 PROCEDURE — 25000132 ZZH RX MED GY IP 250 OP 250 PS 637: Performed by: PHYSICIAN ASSISTANT

## 2019-05-22 PROCEDURE — A9270 NON-COVERED ITEM OR SERVICE: HCPCS | Performed by: PHYSICIAN ASSISTANT

## 2019-05-22 PROCEDURE — 83690 ASSAY OF LIPASE: CPT | Performed by: PHYSICIAN ASSISTANT

## 2019-05-22 RX ORDER — MAGNESIUM CARB/ALUMINUM HYDROX 105-160MG
148 TABLET,CHEWABLE ORAL ONCE
Status: COMPLETED | OUTPATIENT
Start: 2019-05-22 | End: 2019-05-22

## 2019-05-22 RX ORDER — LACTULOSE 10 G/15ML
20 SOLUTION ORAL 3 TIMES DAILY
Qty: 500 ML | Refills: 0 | Status: SHIPPED | OUTPATIENT
Start: 2019-05-22 | End: 2019-06-01

## 2019-05-22 RX ORDER — ACETAMINOPHEN 325 MG/1
650 TABLET ORAL ONCE
Status: COMPLETED | OUTPATIENT
Start: 2019-05-22 | End: 2019-05-22

## 2019-05-22 RX ORDER — BISACODYL 10 MG
10 SUPPOSITORY, RECTAL RECTAL ONCE
Status: COMPLETED | OUTPATIENT
Start: 2019-05-22 | End: 2019-05-22

## 2019-05-22 RX ORDER — BISACODYL 10 MG
10 SUPPOSITORY, RECTAL RECTAL DAILY PRN
Qty: 2 SUPPOSITORY | Refills: 0 | Status: SHIPPED | OUTPATIENT
Start: 2019-05-22 | End: 2020-02-01

## 2019-05-22 RX ORDER — BISACODYL 10 MG
10 SUPPOSITORY, RECTAL RECTAL ONCE
Status: DISCONTINUED | OUTPATIENT
Start: 2019-05-22 | End: 2019-05-22 | Stop reason: HOSPADM

## 2019-05-22 RX ADMIN — ACETAMINOPHEN 650 MG: 325 TABLET, FILM COATED ORAL at 13:13

## 2019-05-22 RX ADMIN — MAGNESIUM CITRATE 148 ML: 1.75 LIQUID ORAL at 00:34

## 2019-05-22 RX ADMIN — MAGNESIUM CITRATE 148 ML: 1.75 LIQUID ORAL at 01:56

## 2019-05-22 RX ADMIN — BISACODYL 10 MG: 10 SUPPOSITORY RECTAL at 00:34

## 2019-05-22 ASSESSMENT — ENCOUNTER SYMPTOMS
NAUSEA: 0
DIFFICULTY URINATING: 0
CONSTIPATION: 1
WEAKNESS: 0
NUMBNESS: 1
DIZZINESS: 0
BACK PAIN: 0
BLOOD IN STOOL: 0
HEADACHES: 1
COLOR CHANGE: 0
BLOOD IN STOOL: 0
MYALGIAS: 0
NECK PAIN: 0
NUMBNESS: 0
FLANK PAIN: 0
NECK PAIN: 0
WHEEZING: 0
DYSURIA: 0
DIARRHEA: 0
COUGH: 0
DYSURIA: 0
HEMATURIA: 0
NECK STIFFNESS: 0
ARTHRALGIAS: 0
ANAL BLEEDING: 0
COUGH: 0
SINUS PAIN: 0
NAUSEA: 0
ABDOMINAL DISTENTION: 0
FEVER: 0
SHORTNESS OF BREATH: 0
VOMITING: 0
EYE PAIN: 0
CHEST TIGHTNESS: 0
ABDOMINAL PAIN: 0
SLEEP DISTURBANCE: 0
PALPITATIONS: 0
EYE REDNESS: 0
FEVER: 0
APPETITE CHANGE: 0
LIGHT-HEADEDNESS: 0
CHEST TIGHTNESS: 0
CONFUSION: 0
HEADACHES: 0
COLOR CHANGE: 0
FLANK PAIN: 0
VOICE CHANGE: 0
SHORTNESS OF BREATH: 0
VOMITING: 0
CHILLS: 0
SORE THROAT: 0
FREQUENCY: 0
CONFUSION: 0
LIGHT-HEADEDNESS: 0
CONSTIPATION: 0
CHILLS: 0
DIAPHORESIS: 0
FREQUENCY: 0
ABDOMINAL PAIN: 0

## 2019-05-22 NOTE — ED NOTES
"Pt returned to ER after receiving a call from RN on nightshift, stating, \"he needed to returned d/t him having a fever when he was discharged last night.\" Pt returned this am. Pt c/o headache and otherwise no c/o any. See assessments. Pt is s/p liver transplant x1 month. Warm blanket provided and call light placed within reach.   "

## 2019-05-22 NOTE — ED PROVIDER NOTES
History     Chief Complaint   Patient presents with     Fever     seen last night for constipation. was told to come back because of a fever. d/t have liver transplant in a month. afebrile in triage     HPI  Ramo Lockwood is a 69 year old male s/p liver transplant one month ago (April 21, 2019, for alcohol and HCC) who was seen yesterday evening and treated for constipation.  He was treated with mag citrate and dulcolax suppository with good results stating he is no longer constipated.  He was noted to have a low grade fever of 99.3 after he was discharged and was called back by the evening nurse to return to the ED for further workup.  He told the nurse he would return the following morning.  His only complaint is a headache.  His abdominal pain and constipation has improved.  He has mild abdominal pain near his incision, otherwise, feels he is doing well.  He does not feel febrile and does not have a fever today in triage.      Allergies:  Allergies   Allergen Reactions     Codeine Camsylate        Problem List:    Patient Active Problem List    Diagnosis Date Noted     Influenza A 01/15/2018     Priority: Medium     Hyponatremia 06/05/2015     Priority: Medium     C. 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     Priority: Medium     Diarrhea 04/19/2015     Priority: Medium     Seizure (H) 04/19/2015     Priority: Medium     Nondependent alcohol abuse, continuous drinking behavior 05/18/2014     Priority: Medium     Overview:   IMO Update 10/11       GI bleed 05/18/2014     Priority: Medium     Alcoholic cirrhosis of liver (H) 05/18/2014     Priority: Medium     Ascites 05/18/2014     Priority: Medium     Chronic hyponatremia 05/18/2014     Priority: Medium     Thrombocytopenia (H) 05/18/2014     Priority: Medium     Elevated INR 05/18/2014     Priority: Medium     Anemia 02/17/2014     Priority: Medium     Acute maxillary sinusitis 12/26/2013     Priority:  Medium     Epistaxis 12/24/2013     Priority: Medium     Hyperlipidemia 12/19/2011     Priority: Medium     Overview:   IMO Update 10/11       Type II diabetes mellitus (H) 03/23/2011     Priority: Medium     Overview:   IMO Update 10/11       Neoplasm of uncertain behavior of lip, oral cavity, 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  PT.  L-spine and T-spine x-rays. L-spine MRI 9/3/02. Lumbar degenerative disc disease. [facility]  IMO Update 10/11          Past Medical History:    Past Medical History:   Diagnosis Date     Acute conjunctivitis, unspecified 11/04/2000     Chronic pain      Diabetes mellitus (H)      Gout      Hyperlipidemia      Hypertension      Special screening for malignant neoplasms, colon 02/16/2009       Past Surgical History:    Past Surgical History:   Procedure Laterality Date     COLONOSCOPY       PHACOEMULSIFICATION WITH STANDARD INTRAOCULAR LENS IMPLANT  6/10/2013    Procedure: PHACOEMULSIFICATION WITH STANDARD INTRAOCULAR LENS IMPLANT;  CATARACT EXTRACTION WITH INTRA OCLULAR LENS LEFT;  Surgeon: Benoit Philip MD;  Location: [facility]     PHACOEMULSIFICATION WITH STANDARD INTRAOCULAR LENS IMPLANT  7/23/2013    Procedure: PHACOEMULSIFICATION WITH STANDARD INTRAOCULAR LENS IMPLANT;  CATARACT EXTRACTION WITH INTRA OCULAR LENS RIGHT;  Surgeon: Benoit Philip MD;  Location: [facility] OR       Family History:    Family History   Problem Relation Age of Onset     Cardiovascular Father         MI       Social History:  Marital Status:   [4]  Social History     Tobacco Use     Smoking status: Never Smoker     Smokeless tobacco: Never Used   Substance Use Topics     Alcohol use: No     Comment: H/O abuse and treatment, sober for 2 yrs.      Drug use: No         Medications:      aspirin (ASA) 81 MG chewable tablet   bisacodyl (DULCOLAX) 10 MG suppository   lactulose (CHRONULAC) 10 GM/15ML solution   mycophenolate (GENERIC EQUIVALENT) 500 MG tablet   predniSONE (DELTASONE) 5 MG tablet   ranitidine (ZANTAC) 150 MG tablet   tacrolimus (ASTAGRAF XL) 1 MG 24 hr capsule   acyclovir (ZOVIRAX) 400 MG tablet         Review of Systems   Constitutional: Negative for appetite change, chills and fever.   HENT: Negative for congestion, dental problem, ear pain, mouth sores, sinus pain and sore throat.    Eyes: Negative for pain, redness and visual disturbance.   Respiratory: Negative for cough, chest tightness and shortness of breath.    Cardiovascular: Negative for chest pain and leg swelling.   Gastrointestinal: Negative for abdominal pain, blood in stool, constipation, diarrhea, nausea and vomiting.   Genitourinary: Negative for difficulty urinating, dysuria, flank pain, frequency, hematuria and urgency.   Musculoskeletal: Negative for arthralgias, neck pain and neck stiffness.   Skin: Negative for color change and rash.   Neurological: Positive for numbness (chronic numbness in bilateral feet) and headaches. Negative for light-headedness.   Psychiatric/Behavioral: Negative for confusion.       Physical Exam   BP: 126/93  Heart Rate: 100  Temp: 98.3  F (36.8  C)  Resp: 18  SpO2: 99 %      Physical Exam   Constitutional: He is oriented to person, place, and time. No distress.   HENT:   Head: Normocephalic and atraumatic.   Right Ear: External ear normal.   Left Ear: External ear normal.   Nose: Nose normal.   Mouth/Throat: Oropharynx is clear and moist.   Eyes: Pupils are equal, round, and reactive to light. Conjunctivae and EOM are normal. No scleral icterus.   Neck: Normal range of motion. Neck supple.   Cardiovascular: Normal rate, regular rhythm, normal heart sounds and intact distal pulses.   Pulmonary/Chest: Effort normal and breath sounds normal. No stridor. No respiratory  distress. He has no wheezes. He has no rales. He exhibits no tenderness.   Abdominal: Soft. Bowel sounds are normal. He exhibits no distension and no mass. There is tenderness (mildly tender over recent incision, healing well). There is no rebound and no guarding. No hernia.   Musculoskeletal: Normal range of motion. He exhibits no edema, tenderness or deformity.   Lymphadenopathy:     He has no cervical adenopathy.   Neurological: He is alert and oriented to person, place, and time.   Skin: Skin is warm and dry. No rash noted. He is not diaphoretic.   Psychiatric: He has a normal mood and affect.       ED Course     ED Course as of May 22 2248   Wed May 22, 2019   1038 He is currently afebrile. No significant symptoms besides a headache.  Will start with checking labs, CXR, and UA.        1204 Call out to [place] [facility] team to discuss his case.  Awaiting phone call.      1243 Discussed patient with Dr. Navarrete who recommended adding a CMV PCR, prograf level, and liver ultrasound doppler of hepatic artery for further evaluation.  If prograf level is greater than 10, that could be contributing to his headaches.  Would recommend treating his headaches with tylenol.        1417 Normal hepatic ultrasound.  Prograf level and CMV send out labs.  Will have him follow up with primary care regarding lab work.  Headache improved with tylenol 650 mg once.  Ok to discharge home with instructions to return to the ED if headache return/worsen or fevers return.          Procedures           Results for orders placed or performed during the hospital encounter of 05/22/19 (from the past 24 hour(s))   CBC with platelets differential   Result Value Ref Range    WBC 6.9 4.0 - 11.0 10e9/L    RBC Count 2.64 (L) 4.4 - 5.9 10e12/L    Hemoglobin 8.7 (L) 13.3 - 17.7 g/dL    Hematocrit 24.7 (L) 40.0 - 53.0 %    MCV 94 78 - 100 fl    MCH 33.0 26.5 - 33.0 pg    MCHC 35.2 31.5 - 36.5 g/dL    RDW 16.8 (H) 10.0 - 15.0 %    Platelet Count 104  (L) 150 - 450 10e9/L    Diff Method Automated Method     % Neutrophils 80.9 %    % Lymphocytes 2.5 %    % Monocytes 9.3 %    % Eosinophils 6.7 %    % Basophils 0.3 %    % Immature Granulocytes 0.3 %    Nucleated RBCs 0 0 /100    Absolute Neutrophil 5.6 1.6 - 8.3 10e9/L    Absolute Lymphocytes 0.2 (L) 0.8 - 5.3 10e9/L    Absolute Monocytes 0.6 0.0 - 1.3 10e9/L    Absolute Eosinophils 0.5 0.0 - 0.7 10e9/L    Absolute Basophils 0.0 0.0 - 0.2 10e9/L    Abs Immature Granulocytes 0.0 0 - 0.4 10e9/L    Absolute Nucleated RBC 0.0    Comprehensive metabolic panel   Result Value Ref Range    Sodium 131 (L) 133 - 144 mmol/L    Potassium 4.5 3.4 - 5.3 mmol/L    Chloride 99 94 - 109 mmol/L    Carbon Dioxide 27 20 - 32 mmol/L    Anion Gap 5 3 - 14 mmol/L    Glucose 114 (H) 70 - 99 mg/dL    Urea Nitrogen 14 7 - 30 mg/dL    Creatinine 1.37 (H) 0.66 - 1.25 mg/dL    GFR Estimate 51 (L) >60 mL/min/[1.73_m2]    GFR Estimate If Black 60 (L) >60 mL/min/[1.73_m2]    Calcium 8.6 8.5 - 10.1 mg/dL    Bilirubin Total 0.7 0.2 - 1.3 mg/dL    Albumin 2.8 (L) 3.4 - 5.0 g/dL    Protein Total 6.1 (L) 6.8 - 8.8 g/dL    Alkaline Phosphatase 87 40 - 150 U/L    ALT 20 0 - 70 U/L    AST 6 0 - 45 U/L   Lactic acid whole blood   Result Value Ref Range    Lactic Acid 0.7 0.7 - 2.0 mmol/L   Lipase   Result Value Ref Range    Lipase 85 73 - 393 U/L   Chest XR,  PA & LAT    Narrative    PROCEDURE:  XR CHEST 2 VW    HISTORY:  fevers, s/p liver transplant.     COMPARISON:  None.    FINDINGS:   The cardiac silhouette is normal in size. The pulmonary vasculature is  normal.  There is some linear atelectasis or scarring at the left lung  base. No pleural effusion or pneumothorax.      Impression    IMPRESSION:  No acute cardiopulmonary disease.      ROBERTA WILCOX MD   UA reflex to Microscopic and Culture   Result Value Ref Range    Color Urine Light Yellow     Appearance Urine Clear     Glucose Urine Negative NEG^Negative mg/dL    Bilirubin Urine Negative  NEG^Negative    Ketones Urine Negative NEG^Negative mg/dL    Specific Gravity Urine 1.012 1.003 - 1.035    Blood Urine Negative NEG^Negative    pH Urine 5.5 4.7 - 8.0 pH    Protein Albumin Urine 10 (A) NEG^Negative mg/dL    Urobilinogen mg/dL Normal 0.0 - 2.0 mg/dL    Nitrite Urine Negative NEG^Negative    Leukocyte Esterase Urine Negative NEG^Negative    Source Unspecified Urine     RBC Urine <1 0 - 2 /HPF    WBC Urine <1 0 - 5 /HPF    Bacteria Urine None (A) NEG^Negative /HPF   Abdomen pelvis vascular study limited US    Narrative    PROCEDURE: US ABDOMEN OR PELVIS DOPPLER LIMITED    HISTORY: liver ultrasound to evaluate hepatic artery flow, s/p liver  transplant 4/21/19 with fevers    TECHNIQUE: Grayscale as well as color flow and spectral Doppler  ultrasound of the liver transplant was performed.     COMPARISON: Only pretransplant imaging is available.    GRAYSCALE LIVER FINDINGS:   The liver transplant is not enlarged at 15.3 cm. It has a homogenous  echogenicity and a smooth echotexture. No intrahepatic mass is seen.  The surface of the liver is smooth.     COLOR FLOW AND SPECTRAL DOPPLER IMAGING FINDINGS:   There is hepatopetal flow in the main portal vein.     Arterial flow is seen in the hepatic arteries with sharp systolic  upstrokes and diastolic flow present. Calculated resistive indices for  the hepatic arteries are normal.     SPECTRAL DOPPLER MEASUREMENTS:   Peak velocity in the region of the portal vein anastomosis: 37 cm/sec    Peak systolic velocity in the region of the hepatic artery  anastomosis: 46 cm/sec  Resistive Index in main hepatic artery: 0.69  Resistive Index in posterior branch right hepatic artery: 0.70  Resistive Index in left hepatic artery: 0.65      Impression    IMPRESSION:     Normal ultrasound appearance of the liver transplant. No evidence of  flow-limiting portal or hepatic artery stenosis. No evidence of  elevated hepatic artery resistive indices to suggest  hepatic  inflammation or rejection.    MARY JANE MCGRAW MD       Medications   acetaminophen (TYLENOL) tablet 650 mg (650 mg Oral Given 5/22/19 2583)       Assessments & Plan (with Medical Decision Making)   69 year old male s/p liver transplant one month ago returns for low grade fever noted yesterday while he was in the ER.  Has not felt feverish.  Afebrile here today.  Work up revealed no signs of infectious source.  Only symptom was headache which may be related to his new medications.  Repeated prograf level and CMV PRC per [facility] transplant team.  Hepatic ultrasound without abnormalities.  Headache improved with tylenol.  Recommended he follow up with primary care as well as his transplant team.  Follow up if headaches worsen or fevers return.      I have reviewed the nursing notes.    I have reviewed the findings, diagnosis, plan and need for follow up with the patient.       Medication List      There are no discharge medications for this visit.         Final diagnoses:   Status post liver transplantation (H)   Fever  Headache    Sheela Zuniga PA-C  5/22/2019   [facility]     Sheela Zuniga PA-C  05/22/19 2967

## 2019-05-22 NOTE — ED NOTES
"Pt presents with c/o 2 days of constipation and now with abdominal pain, \"from the constipation.\"  "

## 2019-05-22 NOTE — DISCHARGE INSTRUCTIONS
Continue to monitor for fevers.  Lab work and CXR negative for signs of infection.  Liver function appears normal.  Discussed with Dr. Navarrete from [place] who recommended prograf level and CMV lab which are send out labs.  Please follow up with primary care in 1-2 days.  Return to the ER if you develop new symptoms or recurrent fevers.

## 2019-05-22 NOTE — ED NOTES
"Call to patient to inform him that due to having a fever before he left the doctor wants him to return to the ED for evaluation. Patient states he just got into bed and does not want to come back in tonight. Informed patient that it was his choice but the doctor wants him to come back now for evaluation. Patient states, \"No, I'll come back in the morning.\"   "

## 2019-05-22 NOTE — ED NOTES
"Patient given verbal and written discharge instructions. Patient verbalized understanding of discharge instructions. Afebrile. Rates headache 6/10. Pt states \"I'm feeling better.\" pt to follow up with PCP in 1-2 days.   "

## 2019-05-22 NOTE — ED NOTES
Discharge instructions given. Verbalized understanding. Signed prescriptions handed to patient with discharge paperwork. Ambulated out of ED independently with steady gait.

## 2019-05-22 NOTE — ED AVS SNAPSHOT
[facility]  [address] [address]  Phone:  [phone]                                    Ramo Lockwood   MRN: [id number]    Department:  [facility]   Date of Visit:  5/22/2019           After Visit Summary Signature Page    I have received my discharge instructions, and my questions have been answered. I have discussed any challenges I see with this plan with the nurse or doctor.    ..........................................................................................................................................  Patient/Patient Representative Signature      ..........................................................................................................................................  Patient Representative Print Name and Relationship to Patient    ..................................................               ................................................  Date                                   Time    ..........................................................................................................................................  Reviewed by Signature/Title    ...................................................              ..............................................  Date                                               Time          22EPIC Rev 08/18

## 2019-05-22 NOTE — ED PROVIDER NOTES
History     Chief Complaint   Patient presents with     Constipation     for 2 days.     Abdominal Pain     The history is provided by the patient.   Constipation   Severity:  Moderate  Timing:  Constant  Chronicity:  New  Stool description:  Pellet like and hard  Associated symptoms: no abdominal pain, no back pain, no dysuria, no fever, no nausea and no vomiting          Allergies:  Allergies   Allergen Reactions     Codeine Camsylate        Problem List:    Patient Active Problem List    Diagnosis Date Noted     Influenza A 01/15/2018     Priority: Medium     Hyponatremia 06/05/2015     Priority: Medium     C. 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     Priority: Medium     Diarrhea 04/19/2015     Priority: Medium     Seizure (H) 04/19/2015     Priority: Medium     Nondependent alcohol abuse, continuous drinking behavior 05/18/2014     Priority: Medium     Overview:   IMO Update 10/11       GI bleed 05/18/2014     Priority: Medium     Alcoholic cirrhosis of liver (H) 05/18/2014     Priority: Medium     Ascites 05/18/2014     Priority: Medium     Chronic hyponatremia 05/18/2014     Priority: Medium     Thrombocytopenia (H) 05/18/2014     Priority: Medium     Elevated INR 05/18/2014     Priority: Medium     Anemia 02/17/2014     Priority: Medium     Acute maxillary sinusitis 12/26/2013     Priority: Medium     Epistaxis 12/24/2013     Priority: Medium     Hyperlipidemia 12/19/2011     Priority: Medium     Overview:   IMO Update 10/11       Type II diabetes mellitus (H) 03/23/2011     Priority: Medium     Overview:   IMO Update 10/11       Neoplasm of uncertain behavior of lip, oral cavity, 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  PT.  L-spine and T-spine x-rays. L-spine MRI 9/3/02. Lumbar degenerative disc disease. [facility].  IMO Update 10/11          Past Medical History:    Past Medical History:   Diagnosis Date     Acute conjunctivitis, unspecified 11/04/2000     Chronic pain      Diabetes mellitus (H)      Gout      Hyperlipidemia      Hypertension      Special screening for malignant neoplasms, colon 02/16/2009       Past Surgical History:    Past Surgical History:   Procedure Laterality Date     COLONOSCOPY       PHACOEMULSIFICATION WITH STANDARD INTRAOCULAR LENS IMPLANT  6/10/2013    Procedure: PHACOEMULSIFICATION WITH STANDARD INTRAOCULAR LENS IMPLANT;  CATARACT EXTRACTION WITH INTRA OCLULAR LENS LEFT;  Surgeon: Benoit Philip MD;  Location: [facility]     PHACOEMULSIFICATION WITH STANDARD INTRAOCULAR LENS IMPLANT  7/23/2013    Procedure: PHACOEMULSIFICATION WITH STANDARD INTRAOCULAR LENS IMPLANT;  CATARACT EXTRACTION WITH INTRA OCULAR LENS RIGHT;  Surgeon: Benoit Philip MD;  Location: [facility] OR       Family History:    Family History   Problem Relation Age of Onset     Cardiovascular Father         MI       Social History:  Marital Status:   [4]  Social History     Tobacco Use     Smoking status: Never Smoker     Smokeless tobacco: Never Used   Substance Use Topics     Alcohol use: No     Comment: H/O abuse and treatment, sober for 2 yrs.      Drug use: No        Medications:      acyclovir (ZOVIRAX) 400 MG tablet   aspirin (ASA) 81 MG chewable tablet   bisacodyl (DULCOLAX) 10 MG suppository   lactulose (CHRONULAC) 10 GM/15ML solution   mycophenolate (GENERIC EQUIVALENT) 500 MG tablet   predniSONE (DELTASONE) 5 MG tablet   ranitidine (ZANTAC) 150 MG tablet   tacrolimus (ASTAGRAF XL) 1 MG 24 hr capsule         Review of Systems   Constitutional: Negative for chills, diaphoresis and fever.   HENT: Negative for voice change.    Eyes: Negative for visual disturbance.   Respiratory: Negative for cough, chest tightness,  shortness of breath and wheezing.    Cardiovascular: Negative for chest pain, palpitations and leg swelling.   Gastrointestinal: Positive for constipation. Negative for abdominal distention, abdominal pain, anal bleeding, blood in stool, nausea and vomiting.   Genitourinary: Negative for decreased urine volume, dysuria, flank pain and frequency.   Musculoskeletal: Negative for back pain, gait problem, myalgias and neck pain.   Skin: Negative for color change, pallor and rash.   Neurological: Negative for dizziness, syncope, weakness, light-headedness, numbness and headaches.   Psychiatric/Behavioral: Negative for confusion, sleep disturbance and suicidal ideas.       Physical Exam   BP: 137/95  Pulse: 93  Heart Rate: 84  Temp: 99.3  F (37.4  C)  Resp: (!) 32  Weight: 72.6 kg (160 lb)  SpO2: 100 %      Physical Exam   Constitutional: He is oriented to person, place, and time. He appears well-developed and well-nourished.   HENT:   Head: Normocephalic and atraumatic.   Eyes: Pupils are equal, round, and reactive to light. Conjunctivae are normal.   Neck: Normal range of motion. Neck supple. No JVD present. No tracheal deviation present. No thyromegaly present.   Cardiovascular: Normal rate, regular rhythm, normal heart sounds and intact distal pulses. Exam reveals no gallop.   No murmur heard.  Pulmonary/Chest: Effort normal and breath sounds normal. No stridor. No respiratory distress. He has no wheezes. He has no rales. He exhibits no tenderness.   Abdominal: Soft. Bowel sounds are normal. He exhibits no distension and no mass. There is no tenderness. There is no rebound and no guarding.       Mild tenderness around surgical wound, no redness, no discharge, no sign or symptoms of infection   Musculoskeletal: Normal range of motion. He exhibits no edema or tenderness.   Lymphadenopathy:     He has no cervical adenopathy.   Neurological: He is alert and oriented to person, place, and time.   Skin: Skin is warm. No  rash noted. No erythema. No pallor.   Psychiatric: His behavior is normal.   Nursing note and vitals reviewed.      ED Course        Procedures             No results found for this or any previous visit (from the past 24 hour(s)).    Medications   bisacodyl (DULCOLAX) Suppository 10 mg (10 mg Rectal Not Given 5/22/19 0157)   bisacodyl (DULCOLAX) Suppository 10 mg (10 mg Rectal Given 5/22/19 0034)   magnesium citrate solution 148 mL (148 mLs Oral Given 5/22/19 0034)   magnesium citrate solution 148 mL (148 mLs Oral Given 5/22/19 0156)       Assessments & Plan (with Medical Decision Making)   Constipation, pt still passing gas, last BM was at AM , small amount of hardstool  Bisacodyl + Magnesium citrate given  Pt had multiple bowel movement, felt much better  D C home  Pt had fever 100.3 on discharge Vitals check, contacted to return to ER for reevaluation, he agreed to come back  I have reviewed the nursing notes.    I have reviewed the findings, diagnosis, plan and need for follow up with the patient.         Medication List      Started    bisacodyl 10 MG suppository  Commonly known as:  DULCOLAX  10 mg, Rectal, DAILY PRN     lactulose 10 GM/15ML solution  Commonly known as:  CHRONULAC  20 g, Oral, 3 TIMES DAILY            Final diagnoses:   Constipation, unspecified constipation type       5/21/2019   [facility]     Carmelo Pérez MD  05/22/19 9771

## 2019-05-23 LAB
TACROLIMUS BLD-MCNC: 13.1 UG/L (ref 5–15)
TME LAST DOSE: NORMAL H

## 2019-05-23 NOTE — RESULT ENCOUNTER NOTE
Result reviewed with JAGUAR Saunders and she would like transplant coordinator Gabriella Oshea contacted at [phone].  Called and message left to return call.

## 2019-05-24 ENCOUNTER — APPOINTMENT (OUTPATIENT)
Dept: GENERAL RADIOLOGY | Facility: HOSPITAL | Age: 69
End: 2019-05-24
Attending: FAMILY MEDICINE
Payer: MEDICARE

## 2019-05-24 ENCOUNTER — HOSPITAL ENCOUNTER (EMERGENCY)
Facility: HOSPITAL | Age: 69
Discharge: HOME | End: 2019-05-24
Attending: FAMILY MEDICINE | Admitting: FAMILY MEDICINE
Payer: MEDICARE

## 2019-05-24 VITALS
DIASTOLIC BLOOD PRESSURE: 78 MMHG | OXYGEN SATURATION: 94 % | SYSTOLIC BLOOD PRESSURE: 133 MMHG | TEMPERATURE: 99.7 F | RESPIRATION RATE: 16 BRPM

## 2019-05-24 DIAGNOSIS — T50.905A MEDICATION SIDE EFFECTS, INITIAL ENCOUNTER: Primary | ICD-10-CM

## 2019-05-24 LAB
ALBUMIN SERPL-MCNC: 2.8 G/DL (ref 3.4–5)
ALP SERPL-CCNC: 98 U/L (ref 40–150)
ALT SERPL W P-5'-P-CCNC: 17 U/L (ref 0–70)
AMMONIA PLAS-SCNC: <10 UMOL/L (ref 10–50)
ANION GAP SERPL CALCULATED.3IONS-SCNC: 8 MMOL/L (ref 3–14)
AST SERPL W P-5'-P-CCNC: 7 U/L (ref 0–45)
BASOPHILS # BLD AUTO: 0 10E9/L (ref 0–0.2)
BASOPHILS NFR BLD AUTO: 0.1 %
BILIRUB SERPL-MCNC: 0.8 MG/DL (ref 0.2–1.3)
BUN SERPL-MCNC: 18 MG/DL (ref 7–30)
CALCIUM SERPL-MCNC: 8.3 MG/DL (ref 8.5–10.1)
CHLORIDE SERPL-SCNC: 94 MMOL/L (ref 94–109)
CMV DNA SPEC NAA+PROBE-ACNC: NORMAL [IU]/ML
CMV DNA SPEC NAA+PROBE-LOG#: NORMAL {LOG_IU}/ML
CO2 SERPL-SCNC: 25 MMOL/L (ref 20–32)
CREAT SERPL-MCNC: 1.81 MG/DL (ref 0.66–1.25)
DIFFERENTIAL METHOD BLD: ABNORMAL
EOSINOPHIL # BLD AUTO: 0.8 10E9/L (ref 0–0.7)
EOSINOPHIL NFR BLD AUTO: 10.4 %
ERYTHROCYTE [DISTWIDTH] IN BLOOD BY AUTOMATED COUNT: 16 % (ref 10–15)
GFR SERPL CREATININE-BSD FRML MDRD: 37 ML/MIN/{1.73_M2}
GLUCOSE SERPL-MCNC: 118 MG/DL (ref 70–99)
HCT VFR BLD AUTO: 24.2 % (ref 40–53)
HGB BLD-MCNC: 8.7 G/DL (ref 13.3–17.7)
IMM GRANULOCYTES # BLD: 0 10E9/L (ref 0–0.4)
IMM GRANULOCYTES NFR BLD: 0.5 %
INR PPP: 1.13 (ref 0.8–1.2)
LACTATE BLD-SCNC: 0.8 MMOL/L (ref 0.7–2)
LIPASE SERPL-CCNC: 94 U/L (ref 73–393)
LYMPHOCYTES # BLD AUTO: 0.2 10E9/L (ref 0.8–5.3)
LYMPHOCYTES NFR BLD AUTO: 2.9 %
MAGNESIUM SERPL-MCNC: 1.6 MG/DL (ref 1.6–2.3)
MCH RBC QN AUTO: 32.6 PG (ref 26.5–33)
MCHC RBC AUTO-ENTMCNC: 36 G/DL (ref 31.5–36.5)
MCV RBC AUTO: 91 FL (ref 78–100)
MONOCYTES # BLD AUTO: 0.7 10E9/L (ref 0–1.3)
MONOCYTES NFR BLD AUTO: 8.8 %
NEUTROPHILS # BLD AUTO: 6.2 10E9/L (ref 1.6–8.3)
NEUTROPHILS NFR BLD AUTO: 77.3 %
NRBC # BLD AUTO: 0 10*3/UL
NRBC BLD AUTO-RTO: 0 /100
PLATELET # BLD AUTO: 128 10E9/L (ref 150–450)
POTASSIUM SERPL-SCNC: 4 MMOL/L (ref 3.4–5.3)
PROT SERPL-MCNC: 6.3 G/DL (ref 6.8–8.8)
RBC # BLD AUTO: 2.67 10E12/L (ref 4.4–5.9)
SODIUM SERPL-SCNC: 127 MMOL/L (ref 133–144)
SPECIMEN SOURCE: NORMAL
TROPONIN I SERPL-MCNC: <0.015 UG/L (ref 0–0.04)
WBC # BLD AUTO: 8 10E9/L (ref 4–11)

## 2019-05-24 PROCEDURE — 93010 ELECTROCARDIOGRAM REPORT: CPT | Performed by: INTERNAL MEDICINE

## 2019-05-24 PROCEDURE — 96375 TX/PRO/DX INJ NEW DRUG ADDON: CPT

## 2019-05-24 PROCEDURE — 83605 ASSAY OF LACTIC ACID: CPT | Performed by: FAMILY MEDICINE

## 2019-05-24 PROCEDURE — 25000128 H RX IP 250 OP 636: Performed by: FAMILY MEDICINE

## 2019-05-24 PROCEDURE — 85610 PROTHROMBIN TIME: CPT | Performed by: FAMILY MEDICINE

## 2019-05-24 PROCEDURE — 84484 ASSAY OF TROPONIN QUANT: CPT | Performed by: FAMILY MEDICINE

## 2019-05-24 PROCEDURE — 36415 COLL VENOUS BLD VENIPUNCTURE: CPT | Performed by: FAMILY MEDICINE

## 2019-05-24 PROCEDURE — 93005 ELECTROCARDIOGRAM TRACING: CPT

## 2019-05-24 PROCEDURE — 99285 EMERGENCY DEPT VISIT HI MDM: CPT | Mod: 25

## 2019-05-24 PROCEDURE — 96376 TX/PRO/DX INJ SAME DRUG ADON: CPT

## 2019-05-24 PROCEDURE — 82140 ASSAY OF AMMONIA: CPT | Performed by: FAMILY MEDICINE

## 2019-05-24 PROCEDURE — 83690 ASSAY OF LIPASE: CPT | Performed by: FAMILY MEDICINE

## 2019-05-24 PROCEDURE — 83735 ASSAY OF MAGNESIUM: CPT | Performed by: FAMILY MEDICINE

## 2019-05-24 PROCEDURE — 80053 COMPREHEN METABOLIC PANEL: CPT | Performed by: FAMILY MEDICINE

## 2019-05-24 PROCEDURE — 96365 THER/PROPH/DIAG IV INF INIT: CPT

## 2019-05-24 PROCEDURE — 99285 EMERGENCY DEPT VISIT HI MDM: CPT | Mod: Z6 | Performed by: FAMILY MEDICINE

## 2019-05-24 PROCEDURE — 71045 X-RAY EXAM CHEST 1 VIEW: CPT | Mod: TC

## 2019-05-24 PROCEDURE — 85025 COMPLETE CBC W/AUTO DIFF WBC: CPT | Performed by: FAMILY MEDICINE

## 2019-05-24 RX ORDER — HYDROMORPHONE HYDROCHLORIDE 1 MG/ML
0.5 INJECTION, SOLUTION INTRAMUSCULAR; INTRAVENOUS; SUBCUTANEOUS ONCE
Status: COMPLETED | OUTPATIENT
Start: 2019-05-24 | End: 2019-05-24

## 2019-05-24 RX ORDER — ONDANSETRON 2 MG/ML
4 INJECTION INTRAMUSCULAR; INTRAVENOUS ONCE
Status: COMPLETED | OUTPATIENT
Start: 2019-05-24 | End: 2019-05-24

## 2019-05-24 RX ORDER — SODIUM CHLORIDE 9 MG/ML
1000 INJECTION, SOLUTION INTRAVENOUS CONTINUOUS
Status: DISCONTINUED | OUTPATIENT
Start: 2019-05-24 | End: 2019-05-24 | Stop reason: HOSPADM

## 2019-05-24 RX ORDER — OXYCODONE HYDROCHLORIDE 10 MG/1
10 TABLET ORAL EVERY 8 HOURS PRN
Qty: 10 TABLET | Refills: 0 | Status: SHIPPED | OUTPATIENT
Start: 2019-05-24 | End: 2019-06-25

## 2019-05-24 RX ORDER — MAGNESIUM SULFATE 1 G/100ML
1 INJECTION INTRAVENOUS ONCE
Status: COMPLETED | OUTPATIENT
Start: 2019-05-24 | End: 2019-05-24

## 2019-05-24 RX ADMIN — SODIUM CHLORIDE 1000 ML: 9 INJECTION, SOLUTION INTRAVENOUS at 20:02

## 2019-05-24 RX ADMIN — ONDANSETRON 4 MG: 2 INJECTION INTRAMUSCULAR; INTRAVENOUS at 19:22

## 2019-05-24 RX ADMIN — HYDROMORPHONE HYDROCHLORIDE 0.5 MG: 1 INJECTION, SOLUTION INTRAMUSCULAR; INTRAVENOUS; SUBCUTANEOUS at 20:07

## 2019-05-24 RX ADMIN — MAGNESIUM SULFATE IN DEXTROSE 1 G: 10 INJECTION, SOLUTION INTRAVENOUS at 20:24

## 2019-05-24 RX ADMIN — HYDROMORPHONE HYDROCHLORIDE 0.5 MG: 1 INJECTION, SOLUTION INTRAMUSCULAR; INTRAVENOUS; SUBCUTANEOUS at 19:22

## 2019-05-24 ASSESSMENT — ENCOUNTER SYMPTOMS
WEAKNESS: 0
FATIGUE: 0
CHEST TIGHTNESS: 0
NECK STIFFNESS: 0
CONFUSION: 0
EYE REDNESS: 0
ARTHRALGIAS: 0
ABDOMINAL PAIN: 0
TREMORS: 0
PALPITATIONS: 0
DIFFICULTY URINATING: 0
COLOR CHANGE: 0

## 2019-05-24 NOTE — ED AVS SNAPSHOT
[facility]  [address]  [facility] [address]  Phone:  [phone]                                    Ramo Lockwood   MRN: [id number]    Department:  [facility]   Date of Visit:  5/24/2019           After Visit Summary Signature Page    I have received my discharge instructions, and my questions have been answered. I have discussed any challenges I see with this plan with the nurse or doctor.    ..........................................................................................................................................  Patient/Patient Representative Signature      ..........................................................................................................................................  Patient Representative Print Name and Relationship to Patient    ..................................................               ................................................  Date                                   Time    ..........................................................................................................................................  Reviewed by Signature/Title    ...................................................              ..............................................  Date                                               Time          22EPIC Rev 08/18

## 2019-05-24 NOTE — ED PROVIDER NOTES
History     Chief Complaint   Patient presents with     Generalized Body Aches     post liver transplant 4/21/19 at [facility], advised to follow up with ED     Headache     HPI  Ramo Lockwood is a 69 year old man with history of EtOH-induced liver failure and HCC s/p liver transplant at [facility] 4/21/19 who presents with mild, diffuse bodyaches and headaches. He called his transplant team at [facility] who he reports told him that his elevated level of tracrolimus is likely causing his S/Sx given his tacrolimus was greater than 10 ug/dL (measured as 13 ug/dL during his evaluation in this ER on 5/22/19). No fevers/chills, congestion/rhinorrhea or cough. No chest pain/shortness of breath, dizziness/diaphoresis or nausea/vomiting.    Allergies:  Allergies   Allergen Reactions     Codeine Camsylate        Problem List:    Patient Active Problem List    Diagnosis Date Noted     Influenza A 01/15/2018     Priority: Medium     Hyponatremia 06/05/2015     Priority: Medium     C. 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     Priority: Medium     Diarrhea 04/19/2015     Priority: Medium     Seizure (H) 04/19/2015     Priority: Medium     Nondependent alcohol abuse, continuous drinking behavior 05/18/2014     Priority: Medium     Overview:   IMO Update 10/11       GI bleed 05/18/2014     Priority: Medium     Alcoholic cirrhosis of liver (H) 05/18/2014     Priority: Medium     Ascites 05/18/2014     Priority: Medium     Chronic hyponatremia 05/18/2014     Priority: Medium     Thrombocytopenia (H) 05/18/2014     Priority: Medium     Elevated INR 05/18/2014     Priority: Medium     Anemia 02/17/2014     Priority: Medium     Acute maxillary sinusitis 12/26/2013     Priority: Medium     Epistaxis 12/24/2013     Priority: Medium     Hyperlipidemia 12/19/2011     Priority: Medium     Overview:   IMO Update 10/11       Type II diabetes mellitus (H) 03/23/2011     Priority:  Medium     Overview:   IMO Update 10/11       Neoplasm of uncertain behavior of lip, oral cavity, 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  PT.  L-spine and T-spine x-rays. L-spine MRI 9/3/02. Lumbar degenerative disc disease. [facility]-.  IMO Update 10/11          Past Medical History:    Past Medical History:   Diagnosis Date     Acute conjunctivitis, unspecified 11/04/2000     Chronic pain      Diabetes mellitus (H)      Gout      Hyperlipidemia      Hypertension      Special screening for malignant neoplasms, colon 02/16/2009       Past Surgical History:    Past Surgical History:   Procedure Laterality Date     COLONOSCOPY       PHACOEMULSIFICATION WITH STANDARD INTRAOCULAR LENS IMPLANT  6/10/2013    Procedure: PHACOEMULSIFICATION WITH STANDARD INTRAOCULAR LENS IMPLANT;  CATARACT EXTRACTION WITH INTRA OCLULAR LENS LEFT;  Surgeon: Benoit Philip MD;  Location: [facility]     PHACOEMULSIFICATION WITH STANDARD INTRAOCULAR LENS IMPLANT  7/23/2013    Procedure: PHACOEMULSIFICATION WITH STANDARD INTRAOCULAR LENS IMPLANT;  CATARACT EXTRACTION WITH INTRA OCULAR LENS RIGHT;  Surgeon: Benoit Philip MD;  Location: [facility]       Family History:    Family History   Problem Relation Age of Onset     Cardiovascular Father         MI       Social History:  Marital Status:   [4]  Social History     Tobacco Use     Smoking status: Never Smoker     Smokeless tobacco: Never Used   Substance Use Topics     Alcohol use: No     Comment: H/O abuse and treatment, sober for 2 yrs.      Drug use: No        Medications:      oxyCODONE IR (ROXICODONE) 10 MG tablet   acyclovir (ZOVIRAX) 400 MG tablet   aspirin (ASA) 81 MG chewable tablet   bisacodyl (DULCOLAX) 10 MG suppository   lactulose (CHRONULAC) 10  GM/15ML solution   mycophenolate (GENERIC EQUIVALENT) 500 MG tablet   predniSONE (DELTASONE) 5 MG tablet   ranitidine (ZANTAC) 150 MG tablet   tacrolimus (ASTAGRAF XL) 1 MG 24 hr capsule         Review of Systems   Constitutional: Negative for fatigue.   HENT: Negative for nosebleeds.    Eyes: Negative for redness.   Respiratory: Negative for chest tightness.    Cardiovascular: Negative for palpitations and leg swelling.   Gastrointestinal: Negative for abdominal pain.   Genitourinary: Negative for difficulty urinating.   Musculoskeletal: Negative for arthralgias and neck stiffness.   Skin: Negative for color change.   Neurological: Negative for tremors and weakness.   Psychiatric/Behavioral: Negative for confusion.       Physical Exam   BP: 135/91  Heart Rate: 99  Temp: 98.8  F (37.1  C)  Resp: 16  SpO2: 99 %      Physical Exam    General Appearance: well-developed, well-nourished, alert & oriented, no apparent distress.    HEENT: atraumatic. Pupils equal, round & reactive to light, extraocular movements intact. Bilateral ear canals clear. Nose without rhinorrhea or epistaxis. Clear oropharynx. Moist mucous membranes.    Neck: normal inspection, non-tender, full & painless ROM, supple, no lymphadenopathy or nuchal rigidity. No jugular venous distension.    Cardiovascular: regular rate, rhythm, normal S1 & S2, no murmurs, rubs or gallops.    Respiratory: clear lung sounds with good air entry, no wheezes rales or rhonchi, no acute respiratory distress.    Gastrointestinal: well-healing surgical incision, normal bowel sounds, non-tender, no masses or organomegaly.    Extremities: normal inspection, 2+/4+ pulses bilaterally, normal & painless ROM, non-tender, joints normal.    Neurologic: CN II - XII intact, no motor/sensory deficit.    Skin: normal color, no skin rash.    ED Course       Patient discussed with Dr. Lock, [facility] Liver Transplant Service, who reviews the labs and recommends the patient continue  with the plan to decrease tacrolimus from 3 mg bid to 2 mg bid (patient was informed of this change earlier this afternoon during his call with the transplant coordinator). She recommends pain management for headache/tremor side effect of tracrolimus with APAP and oxycodone. Patient should call the transplant coordinator on Monday/Tuesday by which time Dr. Lock expects the tacrolimus side effects to decrease by this time. The patient may also call earlier with any further concerns.    Dr. Lock also recommends checking magnesium and supplementing if it is low because patient has had a history of low magnesium. His creatinine is slightly higher than his baseline of 1.3 for which Dr. Lock agrees with IVF fluid bolus and for patient to be encouraged to focus on good hydration.    Procedures        Results for orders placed or performed during the hospital encounter of 05/24/19 (from the past 24 hour(s))   CBC with platelets differential   Result Value Ref Range    WBC 8.0 4.0 - 11.0 10e9/L    RBC Count 2.67 (L) 4.4 - 5.9 10e12/L    Hemoglobin 8.7 (L) 13.3 - 17.7 g/dL    Hematocrit 24.2 (L) 40.0 - 53.0 %    MCV 91 78 - 100 fl    MCH 32.6 26.5 - 33.0 pg    MCHC 36.0 31.5 - 36.5 g/dL    RDW 16.0 (H) 10.0 - 15.0 %    Platelet Count 128 (L) 150 - 450 10e9/L    Diff Method Automated Method     % Neutrophils 77.3 %    % Lymphocytes 2.9 %    % Monocytes 8.8 %    % Eosinophils 10.4 %    % Basophils 0.1 %    % Immature Granulocytes 0.5 %    Nucleated RBCs 0 0 /100    Absolute Neutrophil 6.2 1.6 - 8.3 10e9/L    Absolute Lymphocytes 0.2 (L) 0.8 - 5.3 10e9/L    Absolute Monocytes 0.7 0.0 - 1.3 10e9/L    Absolute Eosinophils 0.8 (H) 0.0 - 0.7 10e9/L    Absolute Basophils 0.0 0.0 - 0.2 10e9/L    Abs Immature Granulocytes 0.0 0 - 0.4 10e9/L    Absolute Nucleated RBC 0.0    Comprehensive metabolic panel   Result Value Ref Range    Sodium 127 (L) 133 - 144 mmol/L    Potassium 4.0 3.4 - 5.3 mmol/L    Chloride 94 94 - 109 mmol/L     Carbon Dioxide 25 20 - 32 mmol/L    Anion Gap 8 3 - 14 mmol/L    Glucose 118 (H) 70 - 99 mg/dL    Urea Nitrogen 18 7 - 30 mg/dL    Creatinine 1.81 (H) 0.66 - 1.25 mg/dL    GFR Estimate 37 (L) >60 mL/min/[1.73_m2]    GFR Estimate If Black 43 (L) >60 mL/min/[1.73_m2]    Calcium 8.3 (L) 8.5 - 10.1 mg/dL    Bilirubin Total 0.8 0.2 - 1.3 mg/dL    Albumin 2.8 (L) 3.4 - 5.0 g/dL    Protein Total 6.3 (L) 6.8 - 8.8 g/dL    Alkaline Phosphatase 98 40 - 150 U/L    ALT 17 0 - 70 U/L    AST 7 0 - 45 U/L   Lactic acid whole blood   Result Value Ref Range    Lactic Acid 0.8 0.7 - 2.0 mmol/L   Lipase   Result Value Ref Range    Lipase 94 73 - 393 U/L   Troponin I   Result Value Ref Range    Troponin I ES <0.015 0.000 - 0.045 ug/L   Ammonia   Result Value Ref Range    Ammonia <10 (L) 10 - 50 umol/L   INR   Result Value Ref Range    INR 1.13 0.80 - 1.20   Magnesium   Result Value Ref Range    Magnesium 1.6 1.6 - 2.3 mg/dL   XR Chest Port 1 View    Narrative    PROCEDURE:  XR CHEST PORT 1 VW    HISTORY:  cough.     COMPARISON:  May 22, 2019    FINDINGS:   The cardiac silhouette is normal in size. The pulmonary vasculature is  normal.  There is some linear subsegmental atelectasis at the left  lung base No pleural effusion or pneumothorax.      Impression    IMPRESSION:  Subsegmental atelectasis left lung base. Mild elevation  of the left hemidiaphragm      ROBERTA WILCOX MD       Medications   0.9% sodium chloride BOLUS (1,000 mLs Intravenous New Bag 5/24/19 2002)     Followed by   sodium chloride 0.9% infusion (has no administration in time range)   magnesium sulfate injection 1 g (has no administration in time range)   ondansetron (ZOFRAN) injection 4 mg (4 mg Intravenous Given 5/24/19 1922)   HYDROmorphone (PF) (DILAUDID) injection 0.5 mg (0.5 mg Intravenous Given 5/24/19 1922)   HYDROmorphone (PF) (DILAUDID) injection 0.5 mg (0.5 mg Intravenous Given 5/24/19 2007)       Assessments & Plan (with Medical Decision Making)   The  patient is a 69 year old man s/p liver transplant who presents with an elevated blood level of tacrolimus with medication side effect.    1) Medication side effect - Patient will be treated with rest, oral fluids and APAP as well as a limited prescription for oxycodone as needed for breakthrough pain.    Plan for patient to call [facility] Transplant Coordinator in 3 - 4 days for follow-up on his current S/Sx or earlier with any worsening S/Sx. Also plan for PCP follow-up in 5 - 7 days regarding this ER visit. The patient verbalizes agreement with this plan as well as to immediately return to the ER with any new/worsening S/Sx.      I have reviewed the nursing notes.    I have reviewed the findings, diagnosis, plan and need for follow up with the patient.       Medication List      Started    oxyCODONE IR 10 MG tablet  Commonly known as:  ROXICODONE  10 mg, Oral, EVERY 8 HOURS PRN            Final diagnoses:   Medication side effects, initial encounter       5/24/2019   [facility]     Chad Mcdaniels MD  05/24/19 2008       Chad Mcdaniels MD  05/24/19 2010

## 2019-05-24 NOTE — ED NOTES
"Patient arrives accompanied by brother for evaluation of generalized body pain and headache. This is patient's 3rd visit this week for similar complaints. Patient had a liver transplant on 5/21 at [facility]. They are currently in process of decreasing dose of his anti-rejection medications. Patient states \"My pain is 25 out of 10.\" Denies nausea or vomiting. Denies fevers. IV placed and call light within reach.   "

## 2019-05-24 NOTE — ED NOTES
Time to complete medication reconciliation was 5 minutes.    Patient was requesting physician to come into room.  Was unable to assess med rec.

## 2019-05-25 LAB
CMV DNA SPEC NAA+PROBE-ACNC: NORMAL [IU]/ML
CMV DNA SPEC NAA+PROBE-LOG#: NORMAL {LOG_IU}/ML
SPECIMEN SOURCE: NORMAL

## 2019-05-25 NOTE — ED NOTES
Face to face report given with opportunity to observe patient.    Report given to LAUREN Dunbar   5/24/2019  7:03 PM

## 2019-05-25 NOTE — ED NOTES
Patient and brother given verbal and written discharge instructions and both verbalized understanding. Patient left ambulatory with brother.

## 2019-06-04 ENCOUNTER — HOSPITAL ENCOUNTER (OUTPATIENT)
Dept: RESPIRATORY THERAPY | Facility: HOSPITAL | Age: 69
Discharge: HOME | End: 2019-06-04
Attending: FAMILY MEDICINE | Admitting: FAMILY MEDICINE
Payer: MEDICARE

## 2019-06-04 VITALS — OXYGEN SATURATION: 100 %

## 2019-06-04 PROCEDURE — 94642 AEROSOL INHALATION TREATMENT: CPT

## 2019-06-04 PROCEDURE — 94664 DEMO&/EVAL PT USE INHALER: CPT | Mod: 59

## 2019-06-04 PROCEDURE — 25000125 ZZHC RX 250: Performed by: FAMILY MEDICINE

## 2019-06-04 PROCEDURE — A9270 NON-COVERED ITEM OR SERVICE: HCPCS | Performed by: FAMILY MEDICINE

## 2019-06-04 PROCEDURE — 94640 AIRWAY INHALATION TREATMENT: CPT

## 2019-06-04 PROCEDURE — 25000132 ZZH RX MED GY IP 250 OP 250 PS 637: Performed by: FAMILY MEDICINE

## 2019-06-04 RX ORDER — ALBUTEROL SULFATE 90 UG/1
2 AEROSOL, METERED RESPIRATORY (INHALATION) EVERY 6 HOURS PRN
Status: DISCONTINUED | OUTPATIENT
Start: 2019-06-04 | End: 2019-06-04

## 2019-06-04 RX ORDER — PENTAMIDINE ISETHIONATE 300 MG/300MG
300 INHALANT RESPIRATORY (INHALATION) ONCE
Status: COMPLETED | OUTPATIENT
Start: 2019-06-04 | End: 2019-06-04

## 2019-06-04 RX ADMIN — PENTAMIDINE ISETHIONATE 300 MG: 300 INHALANT RESPIRATORY (INHALATION) at 12:48

## 2019-06-04 RX ADMIN — ALBUTEROL SULFATE 2 PUFF: 90 AEROSOL, METERED RESPIRATORY (INHALATION) at 12:39

## 2019-06-04 NOTE — PROGRESS NOTES
Pt. Instructed in use of inhaler with spacer- given 2 puffs 15 minutes before Pentamidine- SPO2 before TX 99% on room air- HR-83, RR 16- post tx RR23, SPO2 100%  DS- crackles in L base otherwise clear

## 2019-06-25 ENCOUNTER — HOSPITAL ENCOUNTER (EMERGENCY)
Facility: HOSPITAL | Age: 69
Discharge: HOME | End: 2019-06-25
Attending: PHYSICIAN ASSISTANT | Admitting: PHYSICIAN ASSISTANT
Payer: MEDICARE

## 2019-06-25 VITALS
TEMPERATURE: 97.3 F | OXYGEN SATURATION: 95 % | RESPIRATION RATE: 16 BRPM | SYSTOLIC BLOOD PRESSURE: 149 MMHG | DIASTOLIC BLOOD PRESSURE: 95 MMHG | HEART RATE: 72 BPM

## 2019-06-25 DIAGNOSIS — E87.5 HYPERKALEMIA: ICD-10-CM

## 2019-06-25 LAB
ANION GAP SERPL CALCULATED.3IONS-SCNC: 4 MMOL/L (ref 3–14)
BUN SERPL-MCNC: 19 MG/DL (ref 7–30)
CALCIUM SERPL-MCNC: 8.8 MG/DL (ref 8.5–10.1)
CHLORIDE SERPL-SCNC: 108 MMOL/L (ref 94–109)
CO2 SERPL-SCNC: 24 MMOL/L (ref 20–32)
CREAT SERPL-MCNC: 1.53 MG/DL (ref 0.66–1.25)
GFR SERPL CREATININE-BSD FRML MDRD: 45 ML/MIN/{1.73_M2}
GLUCOSE SERPL-MCNC: 95 MG/DL (ref 70–99)
POTASSIUM SERPL-SCNC: 6.4 MMOL/L (ref 3.4–5.3)
SODIUM SERPL-SCNC: 136 MMOL/L (ref 133–144)

## 2019-06-25 PROCEDURE — 93005 ELECTROCARDIOGRAM TRACING: CPT

## 2019-06-25 PROCEDURE — 96360 HYDRATION IV INFUSION INIT: CPT

## 2019-06-25 PROCEDURE — 25800030 ZZH RX IP 258 OP 636: Performed by: PHYSICIAN ASSISTANT

## 2019-06-25 PROCEDURE — 36415 COLL VENOUS BLD VENIPUNCTURE: CPT | Performed by: PHYSICIAN ASSISTANT

## 2019-06-25 PROCEDURE — 93010 ELECTROCARDIOGRAM REPORT: CPT | Performed by: INTERNAL MEDICINE

## 2019-06-25 PROCEDURE — 25000132 ZZH RX MED GY IP 250 OP 250 PS 637: Mod: GY | Performed by: PHYSICIAN ASSISTANT

## 2019-06-25 PROCEDURE — 99284 EMERGENCY DEPT VISIT MOD MDM: CPT | Mod: 25

## 2019-06-25 PROCEDURE — 80048 BASIC METABOLIC PNL TOTAL CA: CPT | Performed by: PHYSICIAN ASSISTANT

## 2019-06-25 PROCEDURE — 25000128 H RX IP 250 OP 636: Performed by: PHYSICIAN ASSISTANT

## 2019-06-25 PROCEDURE — 96361 HYDRATE IV INFUSION ADD-ON: CPT

## 2019-06-25 PROCEDURE — 99284 EMERGENCY DEPT VISIT MOD MDM: CPT | Mod: Z6 | Performed by: PHYSICIAN ASSISTANT

## 2019-06-25 RX ORDER — SODIUM POLYSTYRENE SULFONATE 15 G/60ML
15 SUSPENSION ORAL; RECTAL ONCE
Status: COMPLETED | OUTPATIENT
Start: 2019-06-25 | End: 2019-06-25

## 2019-06-25 RX ORDER — SODIUM CHLORIDE 9 MG/ML
1000 INJECTION, SOLUTION INTRAVENOUS CONTINUOUS
Status: DISCONTINUED | OUTPATIENT
Start: 2019-06-25 | End: 2019-06-25 | Stop reason: HOSPADM

## 2019-06-25 RX ADMIN — SODIUM CHLORIDE 1000 ML: 9 INJECTION, SOLUTION INTRAVENOUS at 12:39

## 2019-06-25 RX ADMIN — SODIUM CHLORIDE 1000 ML: 9 INJECTION, SOLUTION INTRAVENOUS at 13:09

## 2019-06-25 RX ADMIN — SODIUM POLYSTYRENE SULFONATE 15 G: 15 SUSPENSION ORAL; RECTAL at 13:09

## 2019-06-25 ASSESSMENT — ENCOUNTER SYMPTOMS
CHILLS: 0
DIZZINESS: 0
SHORTNESS OF BREATH: 0
ABDOMINAL PAIN: 0
FEVER: 0

## 2019-06-25 NOTE — DISCHARGE INSTRUCTIONS
Start your Kayexalate as prescribed.  Follow-up in 2 days for repeat potassium.     Please return here for ANY worsening symptoms, new symptoms, or other concerns.

## 2019-06-25 NOTE — ED PROVIDER NOTES
History     Chief Complaint   Patient presents with     Abnormal Labs     notes K+ high, states labs drawn this am. notes liver transplant in april 2019     The history is provided by the patient.     Ramo Lockwood is a 69 year old male who presented to the emergency department for evaluation of an elevated potassium.  Patient had routine laboratory evaluation this morning at his clinic and was advised to come to the emergency department due to elevated potassium.  He otherwise has no questions or concerns.  Past history is most significant for recent liver transplantation.    Allergies:  Allergies   Allergen Reactions     Codeine Camsylate        Problem List:    Patient Active Problem List    Diagnosis Date Noted     Influenza A 01/15/2018     Priority: Medium     Hyponatremia 06/05/2015     Priority: Medium     C. 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     Priority: Medium     Diarrhea 04/19/2015     Priority: Medium     Seizure (H) 04/19/2015     Priority: Medium     Nondependent alcohol abuse, continuous drinking behavior 05/18/2014     Priority: Medium     Overview:   IMO Update 10/11       GI bleed 05/18/2014     Priority: Medium     Alcoholic cirrhosis of liver (H) 05/18/2014     Priority: Medium     Ascites 05/18/2014     Priority: Medium     Chronic hyponatremia 05/18/2014     Priority: Medium     Thrombocytopenia (H) 05/18/2014     Priority: Medium     Elevated INR 05/18/2014     Priority: Medium     Anemia 02/17/2014     Priority: Medium     Acute maxillary sinusitis 12/26/2013     Priority: Medium     Epistaxis 12/24/2013     Priority: Medium     Hyperlipidemia 12/19/2011     Priority: Medium     Overview:   IMO Update 10/11       Type II diabetes mellitus (H) 03/23/2011     Priority: Medium     Overview:   IMO Update 10/11       Neoplasm of uncertain behavior of lip, oral cavity, 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  PT.  L-spine and T-spine x-rays. L-spine MRI 9/3/02. Lumbar degenerative disc disease. [facility]  IMO Update 10/11          Past Medical History:    Past Medical History:   Diagnosis Date     Acute conjunctivitis, unspecified 11/04/2000     Chronic pain      Diabetes mellitus (H)      Gout      Hyperlipidemia      Hypertension      Special screening for malignant neoplasms, colon 02/16/2009       Past Surgical History:    Past Surgical History:   Procedure Laterality Date     COLONOSCOPY       PHACOEMULSIFICATION WITH STANDARD INTRAOCULAR LENS IMPLANT  6/10/2013    Procedure: PHACOEMULSIFICATION WITH STANDARD INTRAOCULAR LENS IMPLANT;  CATARACT EXTRACTION WITH INTRA OCLULAR LENS LEFT;  Surgeon: Benoit Philip MD;  Location: [facility]     PHACOEMULSIFICATION WITH STANDARD INTRAOCULAR LENS IMPLANT  7/23/2013    Procedure: PHACOEMULSIFICATION WITH STANDARD INTRAOCULAR LENS IMPLANT;  CATARACT EXTRACTION WITH INTRA OCULAR LENS RIGHT;  Surgeon: Benoit Philip MD;  Location: [facility] OR       Family History:    Family History   Problem Relation Age of Onset     Cardiovascular Father         MI       Social History:  Marital Status:   [4]  Social History     Tobacco Use     Smoking status: Never Smoker     Smokeless tobacco: Never Used   Substance Use Topics     Alcohol use: No     Comment: H/O abuse and treatment, sober for 2 yrs.      Drug use: No        Medications:      acyclovir (ZOVIRAX) 400 MG tablet   aspirin (ASA) 81 MG chewable tablet   predniSONE (DELTASONE) 5 MG tablet   tacrolimus (ASTAGRAF XL) 1 MG 24 hr capsule   bisacodyl (DULCOLAX) 10 MG suppository         Review of Systems   Constitutional: Negative for chills and fever.   Respiratory: Negative for shortness of breath.    Cardiovascular: Negative  for chest pain.   Gastrointestinal: Negative for abdominal pain.   Genitourinary: Negative.    Neurological: Negative for dizziness.       Physical Exam   BP: (!) 151/6  Heart Rate: 78  Temp: 97.8  F (36.6  C)  Resp: 16  SpO2: 100 %      Physical Exam   Constitutional: He is oriented to person, place, and time. He appears well-developed and well-nourished.   Pleasant and talkative   Eyes: Conjunctivae and EOM are normal.   Neck: Normal range of motion. Neck supple.   Pulmonary/Chest: Effort normal.   Neurological: He is alert and oriented to person, place, and time.   Skin: Skin is warm and dry. Capillary refill takes less than 2 seconds.   Psychiatric: He has a normal mood and affect.   Nursing note and vitals reviewed.      ED Course        Procedures          EKG shows a sinus rhythm with a first-degree AV block.  Rate of 67.  VT interval is prolonged at 234 ms.  Normal QRS duration.  Normal QTC.  Very slight left axis deviation at -33 degrees.  Normal P wave duration.  There are no concerning ST segments.  There are no concerning T waves.  There is no evidence of ectopy, preexcitation, or ischemia.    Critical Care time:  none               Results for orders placed or performed during the hospital encounter of 06/25/19 (from the past 24 hour(s))   Basic metabolic panel   Result Value Ref Range    Sodium 136 133 - 144 mmol/L    Potassium 6.4 (HH) 3.4 - 5.3 mmol/L    Chloride 108 94 - 109 mmol/L    Carbon Dioxide 24 20 - 32 mmol/L    Anion Gap 4 3 - 14 mmol/L    Glucose 95 70 - 99 mg/dL    Urea Nitrogen 19 7 - 30 mg/dL    Creatinine 1.53 (H) 0.66 - 1.25 mg/dL    GFR Estimate 45 (L) >60 mL/min/[1.73_m2]    GFR Estimate If Black 52 (L) >60 mL/min/[1.73_m2]    Calcium 8.8 8.5 - 10.1 mg/dL       Medications   0.9% sodium chloride BOLUS (1,000 mLs Intravenous New Bag 6/25/19 1239)     Followed by   sodium chloride 0.9% infusion (1,000 mLs Intravenous New Bag 6/25/19 1309)   sodium polystyrene (KAYEXALATE) suspension  15 g (15 g Oral Given 6/25/19 1309)       Assessments & Plan (with Medical Decision Making)   EKG shows no concerns.  Patient was treated with IV fluids and a single dose of Kayexalate after discussion with hospitalist Dr. Godwin.  He was scheduled for repeat potassium and the patient refused.  He was adamant on discharge with home Kayexalate and follow-up in 2 days.  He tells me that he knows what to do with his potassium is elevated and that is why he has home Kayexalate for these types of issues.  He adamantly denies any questions or concerns and tells me that he is entirely asymptomatic.  I had a long detailed discussion with him.  The patient again refused any further work-up and was adamant on discharge.    This document was prepared using a combination of typing and voice generated software.  While every attempt was made for accuracy, spelling and grammatical errors may exist.    I have reviewed the nursing notes.    I have reviewed the findings, diagnosis, plan and need for follow up with the patient.          Medication List      There are no discharge medications for this visit.         Final diagnoses:   Hyperkalemia       6/25/2019   [facility]     Hari Del Rio PA-C  06/25/19 1548

## 2019-06-25 NOTE — ED AVS SNAPSHOT
[facility]  [address] [address]  Phone:  [phone]                                    Ramo Lockwood   MRN: [id number]    Department:  [facility]   Date of Visit:  6/25/2019           After Visit Summary Signature Page    I have received my discharge instructions, and my questions have been answered. I have discussed any challenges I see with this plan with the nurse or doctor.    ..........................................................................................................................................  Patient/Patient Representative Signature      ..........................................................................................................................................  Patient Representative Print Name and Relationship to Patient    ..................................................               ................................................  Date                                   Time    ..........................................................................................................................................  Reviewed by Signature/Title    ...................................................              ..............................................  Date                                               Time          22EPIC Rev 08/18

## 2019-06-25 NOTE — ED NOTES
DATE:  6/25/2019   TIME OF RECEIPT FROM LAB:  1213  LAB TEST:  Potassium  LAB VALUE:  6.4  RESULTS GIVEN WITH READ-BACK TO (PROVIDER):  Hari Del Rio PA-C  TIME LAB VALUE REPORTED TO PROVIDER:   1219

## 2019-06-25 NOTE — ED NOTES
"Pt to ED via private vehicle. Pt stated that he goes in weekly to get labs drawn since having a liver transplant in April and pt reported he received a phone call that his potassium is 7 and instructed pt to come to the ED.  Pt denies any problems. Denies any chest pains.  Pt has no problems with bowel and bladder.  Pt reported \"the sooner I get to leave the better\"    "

## 2019-07-02 ENCOUNTER — HOSPITAL ENCOUNTER (OUTPATIENT)
Dept: RESPIRATORY THERAPY | Facility: HOSPITAL | Age: 69
Discharge: HOME | End: 2019-07-02
Attending: FAMILY MEDICINE | Admitting: FAMILY MEDICINE
Payer: MEDICARE

## 2019-07-02 VITALS — OXYGEN SATURATION: 98 %

## 2019-07-02 PROCEDURE — 94642 AEROSOL INHALATION TREATMENT: CPT

## 2019-07-02 PROCEDURE — 40000275 ZZH STATISTIC RCP TIME EA 10 MIN

## 2019-07-02 PROCEDURE — 25000125 ZZHC RX 250: Performed by: FAMILY MEDICINE

## 2019-07-02 PROCEDURE — A9270 NON-COVERED ITEM OR SERVICE: HCPCS | Mod: GY | Performed by: FAMILY MEDICINE

## 2019-07-02 PROCEDURE — 25000132 ZZH RX MED GY IP 250 OP 250 PS 637: Mod: GY | Performed by: FAMILY MEDICINE

## 2019-07-02 PROCEDURE — 94640 AIRWAY INHALATION TREATMENT: CPT

## 2019-07-02 RX ORDER — PENTAMIDINE ISETHIONATE 300 MG/300MG
300 INHALANT RESPIRATORY (INHALATION) ONCE
Status: COMPLETED | OUTPATIENT
Start: 2019-07-02 | End: 2019-07-02

## 2019-07-02 RX ORDER — ALBUTEROL SULFATE 90 UG/1
2 AEROSOL, METERED RESPIRATORY (INHALATION) ONCE
Status: COMPLETED | OUTPATIENT
Start: 2019-07-02 | End: 2019-07-02

## 2019-07-02 RX ADMIN — ALBUTEROL SULFATE 2 PUFF: 90 AEROSOL, METERED RESPIRATORY (INHALATION) at 10:18

## 2019-07-02 RX ADMIN — PENTAMIDINE ISETHIONATE 300 MG: 300 INHALANT RESPIRATORY (INHALATION) at 10:16

## 2019-07-02 NOTE — PROGRESS NOTES
Nubent neb given 300mg through Respigard neb. Breath sounds clear diminished bilat.  Spao2 98%.  Albuterol MDI given prior to neb.

## 2019-08-02 ENCOUNTER — HOSPITAL ENCOUNTER (OUTPATIENT)
Dept: RESPIRATORY THERAPY | Facility: HOSPITAL | Age: 69
Discharge: HOME | End: 2019-08-02
Attending: FAMILY MEDICINE | Admitting: FAMILY MEDICINE
Payer: MEDICARE

## 2019-08-02 VITALS — OXYGEN SATURATION: 99 %

## 2019-08-02 PROCEDURE — 94642 AEROSOL INHALATION TREATMENT: CPT

## 2019-08-02 PROCEDURE — 94640 AIRWAY INHALATION TREATMENT: CPT

## 2019-08-02 PROCEDURE — 40000275 ZZH STATISTIC RCP TIME EA 10 MIN

## 2019-08-02 PROCEDURE — 25000125 ZZHC RX 250: Performed by: FAMILY MEDICINE

## 2019-08-02 RX ORDER — PENTAMIDINE ISETHIONATE 300 MG/300MG
300 INHALANT RESPIRATORY (INHALATION) ONCE
Status: COMPLETED | OUTPATIENT
Start: 2019-08-02 | End: 2019-08-02

## 2019-08-02 RX ORDER — ALBUTEROL SULFATE 90 UG/1
2 AEROSOL, METERED RESPIRATORY (INHALATION)
Status: COMPLETED | OUTPATIENT
Start: 2019-08-02 | End: 2019-08-02

## 2019-08-02 RX ADMIN — PENTAMIDINE ISETHIONATE 300 MG: 300 INHALANT RESPIRATORY (INHALATION) at 10:29

## 2019-08-02 RX ADMIN — ALBUTEROL SULFATE 2 PUFF: 90 INHALANT RESPIRATORY (INHALATION) at 10:21

## 2019-08-02 NOTE — PROGRESS NOTES
Patient arrived for outpatient nebupent treatment.  2 puffs albuterol MDI given prior to nebupent.  300mg nebupent mixed with 6ml sterile water.  Patient tolerated treatment well.  Lungs remain clear.  Spo2 99% on room air.

## 2019-09-03 ENCOUNTER — HOSPITAL ENCOUNTER (OUTPATIENT)
Dept: RESPIRATORY THERAPY | Facility: HOSPITAL | Age: 69
Discharge: HOME | End: 2019-09-03
Attending: FAMILY MEDICINE | Admitting: FAMILY MEDICINE
Payer: MEDICARE

## 2019-09-03 VITALS — OXYGEN SATURATION: 99 %

## 2019-09-03 PROCEDURE — 94642 AEROSOL INHALATION TREATMENT: CPT

## 2019-09-03 PROCEDURE — 40000275 ZZH STATISTIC RCP TIME EA 10 MIN

## 2019-09-03 RX ORDER — PENTAMIDINE ISETHIONATE 300 MG/300MG
300 INHALANT RESPIRATORY (INHALATION) ONCE
Status: DISCONTINUED | OUTPATIENT
Start: 2019-09-03 | End: 2019-09-04 | Stop reason: HOSPADM

## 2019-10-03 ENCOUNTER — HOSPITAL ENCOUNTER (OUTPATIENT)
Dept: RESPIRATORY THERAPY | Facility: HOSPITAL | Age: 69
Discharge: HOME | End: 2019-10-03
Attending: FAMILY MEDICINE | Admitting: FAMILY MEDICINE
Payer: MEDICARE

## 2019-10-03 VITALS — OXYGEN SATURATION: 97 %

## 2019-10-03 PROCEDURE — 94642 AEROSOL INHALATION TREATMENT: CPT

## 2019-10-03 PROCEDURE — 40000275 ZZH STATISTIC RCP TIME EA 10 MIN

## 2019-10-03 PROCEDURE — 25000125 ZZHC RX 250: Performed by: FAMILY MEDICINE

## 2019-10-03 RX ORDER — PENTAMIDINE ISETHIONATE 300 MG/300MG
300 INHALANT RESPIRATORY (INHALATION) ONCE
Status: COMPLETED | OUTPATIENT
Start: 2019-10-03 | End: 2019-10-03

## 2019-10-03 RX ADMIN — PENTAMIDINE ISETHIONATE 300 MG: 300 INHALANT RESPIRATORY (INHALATION) at 11:36

## 2019-10-03 NOTE — PROGRESS NOTES
Patient took home Albuterol inhaler 2 puffs 20 minutes prior to appointment.  300mg nebupent given in filtered neb.  Lungs clear RR 18, Spo2 97% on room air.  Patient tolerated treatment well.

## 2020-02-01 ENCOUNTER — APPOINTMENT (OUTPATIENT)
Dept: GENERAL RADIOLOGY | Facility: HOSPITAL | Age: 70
End: 2020-02-01
Attending: FAMILY MEDICINE
Payer: MEDICARE

## 2020-02-01 ENCOUNTER — HOSPITAL ENCOUNTER (EMERGENCY)
Facility: HOSPITAL | Age: 70
Discharge: HOME | End: 2020-02-01
Attending: FAMILY MEDICINE | Admitting: FAMILY MEDICINE
Payer: MEDICARE

## 2020-02-01 VITALS
OXYGEN SATURATION: 99 % | DIASTOLIC BLOOD PRESSURE: 88 MMHG | WEIGHT: 195 LBS | BODY MASS INDEX: 27.92 KG/M2 | TEMPERATURE: 98.7 F | HEIGHT: 70 IN | SYSTOLIC BLOOD PRESSURE: 138 MMHG | RESPIRATION RATE: 16 BRPM

## 2020-02-01 DIAGNOSIS — M25.532 LEFT WRIST PAIN: Primary | ICD-10-CM

## 2020-02-01 PROCEDURE — 73110 X-RAY EXAM OF WRIST: CPT | Mod: TC,LT

## 2020-02-01 PROCEDURE — 99283 EMERGENCY DEPT VISIT LOW MDM: CPT

## 2020-02-01 PROCEDURE — 25000132 ZZH RX MED GY IP 250 OP 250 PS 637: Mod: GY | Performed by: FAMILY MEDICINE

## 2020-02-01 PROCEDURE — 99284 EMERGENCY DEPT VISIT MOD MDM: CPT | Mod: Z6 | Performed by: FAMILY MEDICINE

## 2020-02-01 RX ORDER — ACETAMINOPHEN 500 MG
500 TABLET ORAL ONCE
Status: DISCONTINUED | OUTPATIENT
Start: 2020-02-01 | End: 2020-02-01 | Stop reason: DRUGHIGH

## 2020-02-01 RX ORDER — IBUPROFEN 200 MG
600 TABLET ORAL ONCE
Qty: 60 TABLET | Refills: 0 | COMMUNITY
Start: 2020-02-01 | End: 2020-02-01

## 2020-02-01 RX ORDER — ACETAMINOPHEN 500 MG
500 TABLET ORAL ONCE
Qty: 60 TABLET | Refills: 0 | COMMUNITY
Start: 2020-02-01 | End: 2020-02-01

## 2020-02-01 RX ORDER — IBUPROFEN 200 MG
400 TABLET ORAL ONCE
Status: COMPLETED | OUTPATIENT
Start: 2020-02-01 | End: 2020-02-01

## 2020-02-01 RX ORDER — ACETAMINOPHEN 325 MG/1
650 TABLET ORAL ONCE
Status: COMPLETED | OUTPATIENT
Start: 2020-02-01 | End: 2020-02-01

## 2020-02-01 RX ADMIN — ACETAMINOPHEN 650 MG: 325 TABLET, FILM COATED ORAL at 09:29

## 2020-02-01 RX ADMIN — IBUPROFEN 400 MG: 200 TABLET, FILM COATED ORAL at 09:29

## 2020-02-01 ASSESSMENT — ENCOUNTER SYMPTOMS
NECK PAIN: 0
DIARRHEA: 0
NAUSEA: 0
FEVER: 0
PALPITATIONS: 0
WHEEZING: 0
SHORTNESS OF BREATH: 0
SORE THROAT: 0
CHILLS: 0
BACK PAIN: 0
DYSURIA: 0
COUGH: 0
DIZZINESS: 0
VOMITING: 0
ABDOMINAL PAIN: 0

## 2020-02-01 ASSESSMENT — MIFFLIN-ST. JEOR: SCORE: 1645.76

## 2020-02-01 NOTE — ED PROVIDER NOTES
History     Chief Complaint   Patient presents with     Fall     Wrist Pain     HPI  Ramo Lockwood is a 69 year old right handed male who has a history of alcoholic liver cirrhosis (last drink 2014), liver transplant ([facility], 4/19) and diabetes (resolved), tacrocronallin (caused headaches, htn), lumbar back pain (3 herniated lumbar discs) and he was cleaning snow and ice off the roof and fell on the roof but not off the roof. He doesn't know how he landed on his left wrist. He didn't hit his head or lose consciousness.  CMS intact; patient states he does have some numbness and tingling to his thumb and 1st finger (jsut the distal phalanx).  Has had his arm ace wrapped.  Denies any other injuries in the fall.  Is not on blood thinners (he does take asa 81mg daily). Took 650mg tylenol once at 0500. He has swelling. Pain is 10/10. He iced it. He elevated it.     Social history  2/20: Ramo lives by himself. He has 2 children. Retired from being  at [employer].     Allergies:  Allergies   Allergen Reactions     Codeine Nausea     Codeine Camsylate        Problem List:    Patient Active Problem List    Diagnosis Date Noted     Influenza A 01/15/2018     Priority: Medium     Hyponatremia 06/05/2015     Priority: Medium     C. 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     Priority: Medium     Diarrhea 04/19/2015     Priority: Medium     Seizure (H) 04/19/2015     Priority: Medium     Nondependent alcohol abuse, continuous drinking behavior 05/18/2014     Priority: Medium     Overview:   IMO Update 10/11       GI bleed 05/18/2014     Priority: Medium     Alcoholic cirrhosis of liver (H) 05/18/2014     Priority: Medium     Ascites 05/18/2014     Priority: Medium     Chronic hyponatremia 05/18/2014     Priority: Medium     Thrombocytopenia (H) 05/18/2014     Priority: Medium     Elevated INR 05/18/2014     Priority: Medium     Anemia  02/17/2014     Priority: Medium     Acute maxillary sinusitis 12/26/2013     Priority: Medium     Epistaxis 12/24/2013     Priority: Medium     Hyperlipidemia 12/19/2011     Priority: Medium     Overview:   IMO Update 10/11       Type II diabetes mellitus (H) 03/23/2011     Priority: Medium     Overview:   IMO Update 10/11       Neoplasm of uncertain behavior of lip, oral cavity, 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  PT.  L-spine and T-spine x-rays. L-spine MRI 9/3/02. Lumbar degenerative disc disease. [facility]-.  IMO Update 10/11          Past Medical History:    Past Medical History:   Diagnosis Date     Acute conjunctivitis, unspecified 11/04/2000     Chronic pain      Diabetes mellitus (H)      Gout      Hyperlipidemia      Hypertension      Special screening for malignant neoplasms, colon 02/16/2009       Past Surgical History:    Past Surgical History:   Procedure Laterality Date     COLONOSCOPY       PHACOEMULSIFICATION WITH STANDARD INTRAOCULAR LENS IMPLANT  6/10/2013    Procedure: PHACOEMULSIFICATION WITH STANDARD INTRAOCULAR LENS IMPLANT;  CATARACT EXTRACTION WITH INTRA OCLULAR LENS LEFT;  Surgeon: Benoit Philip MD;  Location: [facility]     PHACOEMULSIFICATION WITH STANDARD INTRAOCULAR LENS IMPLANT  7/23/2013    Procedure: PHACOEMULSIFICATION WITH STANDARD INTRAOCULAR LENS IMPLANT;  CATARACT EXTRACTION WITH INTRA OCULAR LENS RIGHT;  Surgeon: Benoit Philip MD;  Location: [facility] OR       Family History:    Family History   Problem Relation Age of Onset     Cardiovascular Father         MI       Social History:  Marital Status:   [4]  Social History     Tobacco Use     Smoking status: Never Smoker     Smokeless tobacco: Never Used   Substance Use Topics     Alcohol use:  "No     Comment: H/O abuse and treatment, sober for 2 yrs.      Drug use: No        Medications:    acetaminophen (TYLENOL) 500 MG tablet  aspirin (ASA) 81 MG chewable tablet  ibuprofen (ADVIL/MOTRIN) 200 MG tablet  tacrolimus (ASTAGRAF XL) 1 MG 24 hr capsule          Review of Systems   Constitutional: Negative for chills and fever.   HENT: Negative for congestion, ear pain and sore throat.    Respiratory: Negative for cough, shortness of breath and wheezing.    Cardiovascular: Negative for chest pain and palpitations.   Gastrointestinal: Negative for abdominal pain, diarrhea, nausea and vomiting.   Genitourinary: Negative for dysuria.   Musculoskeletal: Negative for back pain and neck pain.   Skin: Negative for rash.   Neurological: Negative for dizziness.   Psychiatric/Behavioral:        No depression or anxiety.       Physical Exam   BP: 138/88  Heart Rate: 81  Temp: 98.7  F (37.1  C)  Resp: 16  Height: 177.8 cm (5' 10\")  Weight: 88.5 kg (195 lb)  SpO2: 99 %      Physical Exam  Vitals signs and nursing note reviewed.   Constitutional:       General: He is not in acute distress.     Appearance: He is well-developed.   HENT:      Head: Normocephalic and atraumatic.   Eyes:      Pupils: Pupils are equal, round, and reactive to light.   Neck:      Musculoskeletal: Neck supple.   Cardiovascular:      Rate and Rhythm: Normal rate and regular rhythm.      Heart sounds: Normal heart sounds. No murmur. No friction rub. No gallop.    Pulmonary:      Effort: Pulmonary effort is normal. No respiratory distress.      Breath sounds: Normal breath sounds.   Abdominal:      Tenderness: There is no abdominal tenderness. There is no guarding or rebound.   Musculoskeletal:         General: Swelling (left wrist is swollen  he is tender across the entire dorsum of the wrist.   is 4/5. ) present.   Lymphadenopathy:      Cervical: No cervical adenopathy.   Skin:     General: Skin is warm and dry.   Neurological:      Mental Status: " "He is alert and oriented to person, place, and time.         ED Course        Procedures                 Patient Vitals for the past 24 hrs:   BP Temp Temp src Heart Rate Resp SpO2 Height Weight   02/01/20 0802 138/88 98.7  F (37.1  C) Tympanic 81 16 99 % 1.778 m (5' 10\") 88.5 kg (195 lb)       LABORATORY (REVIEWED AND INTERPRETED):  CBC BMP Liver Panel   Recent Labs   Lab Test 05/24/19  1837   WBC 8.0   HGB 8.7*   *    Recent Labs   Lab Test 06/25/19  1153   POTASSIUM 6.4*   CHLORIDE 108   BUN 19    Recent Labs   Lab Test 05/24/19  1837   BILITOTAL 0.8   ALT 17   AST 7   LIPASE 94        UA     DIP MICRO   Recent Labs   Lab Test 05/22/19  1200   COLOR Light Yellow   NITRITE Negative    Invalid input(s): RBCUA, WBCUA, BACTERIAUA, EPITHELIALUA       OTHER LABS   Recent Labs   Lab Test 05/24/19  1837   INR 1.13   LIPASE 94            INTERVENTIONS:  Medications   acetaminophen (TYLENOL) tablet 500 mg (has no administration in time range)   ibuprofen (ADVIL/MOTRIN) tablet 400 mg (has no administration in time range)   acetaminophen (TYLENOL) tablet 650 mg (has no administration in time range)       ECG (Reviewed and Interpreted by me):  None    IMAGING (Reviewed and Interpreted by me):  Left wrist xray: no fx  Report reviewed from radiologist.    ED COURSE:  8:18 AM The patient has been seen and evaluated by me.  I have reviewed the medical records.    discussed xray    9:24 AM tylenol and ibuprofen ordered.     IMPRESSIONS AND PLAN:  Left wrist pain: Ramo is a 69-year-old male who is status post liver transplant ([facility], 4/19) who was on his roof yesterday cleaning off the ice.  He fell on the roof and injured his left wrist.  He does not know how he fell.  He did not hit his head nor did he lose consciousness nor did he fall off the roof.  X-ray reviewed by myself and radiologist read it as negative as well.  He does have significant swelling and tenderness.  He only took 1 dose of Tylenol.  He is given Tylenol " 650 mg and ibuprofen 400 mg here.  He is given a brace for comfort.  He is to follow-up with his primary care physician in 1 week.  RICE    DIAGNOSIS:    ICD-10-CM    1. Left wrist pain M25.532 [employer]       DISCHARGE MEDICATIONS:  New Prescriptions    ACETAMINOPHEN (TYLENOL) 500 MG TABLET    Take 1 tablet (500 mg) by mouth once for 1 dose    IBUPROFEN (ADVIL/MOTRIN) 200 MG TABLET    Take 3 tablets (600 mg) by mouth once for 1 dose         LOS: 3      2/1/2020  [facility]    No Ref-Primary, Physician       Palma Lopez MD  02/01/20 0956

## 2020-02-01 NOTE — ED TRIAGE NOTES
Patient states he slipped on the ice yesterday afternoon and broke his fall with his left wrist.  States since then his wrist has been swollen and sore.  CMS intact; patient states he does have some numbness and tingling to his thumb and 1st finger.  Has had his arm ace wrapped.  Denies any other injuries in the fall.  Is not on blood thinners.

## 2020-02-01 NOTE — DISCHARGE INSTRUCTIONS
Thank you for coming to [facility].  If you are concerned or things get worse, don't hesitate to return to the Emergency Room.    Expect increased pain in the first 48 hours.  Things should slowly improve over the next week.  If you aren't feeling back to normal, follow-up with your primary medical provider.    Use tylenol 1000 mg up to 4 times per day or percocet/vicodin/Tylenol 3.  Don't exceed 2000mg in 24 hours.     Use ibuprofen (advil or motrin) 600 mg three times daily with food.    You can use both at the same time and repeat in 6 hours or you can alternate every 3 hours or you can do one or the other.

## 2020-02-01 NOTE — ED AVS SNAPSHOT
[facility]  [address]  [place] [address]  Phone:  [phone]                                    Ramo Lockwood   MRN: [id number]    Department:  [facility]   Date of Visit:  2/1/2020           After Visit Summary Signature Page    I have received my discharge instructions, and my questions have been answered. I have discussed any challenges I see with this plan with the nurse or doctor.    ..........................................................................................................................................  Patient/Patient Representative Signature      ..........................................................................................................................................  Patient Representative Print Name and Relationship to Patient    ..................................................               ................................................  Date                                   Time    ..........................................................................................................................................  Reviewed by Signature/Title    ...................................................              ..............................................  Date                                               Time          22EPIC Rev 08/18

## 2020-09-14 ENCOUNTER — VIRTUAL VISIT (OUTPATIENT)
Dept: FAMILY MEDICINE | Facility: OTHER | Age: 70
End: 2020-09-14
Attending: PHYSICIAN ASSISTANT
Payer: MEDICARE

## 2020-09-14 VITALS — WEIGHT: 195 LBS | HEIGHT: 70 IN | BODY MASS INDEX: 27.92 KG/M2

## 2020-09-14 DIAGNOSIS — Z20.822 ENCOUNTER FOR LABORATORY TESTING FOR COVID-19 VIRUS: Primary | ICD-10-CM

## 2020-09-14 DIAGNOSIS — Z20.822 EXPOSURE TO COVID-19 VIRUS: ICD-10-CM

## 2020-09-14 DIAGNOSIS — Z20.822 COVID-19 RULED OUT: Primary | ICD-10-CM

## 2020-09-14 PROCEDURE — 99441 ZZC PHYSICIAN TELEPHONE EVALUATION 5-10 MIN: CPT | Performed by: PHYSICIAN ASSISTANT

## 2020-09-14 PROCEDURE — C9803 HOPD COVID-19 SPEC COLLECT: HCPCS

## 2020-09-14 PROCEDURE — 99207 ZZC NO CHARGE LOS: CPT

## 2020-09-14 PROCEDURE — U0003 INFECTIOUS AGENT DETECTION BY NUCLEIC ACID (DNA OR RNA); SEVERE ACUTE RESPIRATORY SYNDROME CORONAVIRUS 2 (SARS-COV-2) (CORONAVIRUS DISEASE [COVID-19]), AMPLIFIED PROBE TECHNIQUE, MAKING USE OF HIGH THROUGHPUT TECHNOLOGIES AS DESCRIBED BY CMS-2020-01-R: HCPCS | Mod: ZL | Performed by: PHYSICIAN ASSISTANT

## 2020-09-14 RX ORDER — AMLODIPINE BESYLATE 5 MG/1
TABLET ORAL
COMMUNITY
Start: 2020-09-09 | End: 2022-05-03

## 2020-09-14 ASSESSMENT — MIFFLIN-ST. JEOR: SCORE: 1640.76

## 2020-09-14 NOTE — PROGRESS NOTES
"Ramo Lockwood is a 70 year old male who is being evaluated via a billable telephone visit.      The patient has been notified of following:     \"This telephone visit will be conducted via a call between you and your physician/provider. We have found that certain health care needs can be provided without the need for a physical exam.  This service lets us provide the care you need with a short phone conversation.  If a prescription is necessary we can send it directly to your pharmacy.  If lab work is needed we can place an order for that and you can then stop by our lab to have the test done at a later time.    Telephone visits are billed at different rates depending on your insurance coverage. During this emergency period, for some insurers they may be billed the same as an in-person visit.  Please reach out to your insurance provider with any questions.    If during the course of the call the physician/provider feels a telephone visit is not appropriate, you will not be charged for this service.\"    Patient has given verbal consent for Telephone visit?  Yes    What phone number would you like to be contacted at? [phone]    How would you like to obtain your AVS? Mail a copy    Subjective     Ramo Lockwood is a 70 year old male who presents via phone visit today for the following health issues:    HPI    Patient is contacted via telephone for consideration of COVID-19 testing. Patient states his neighbor tested positive for COVID on Saturday and he was in prolonged close contact with her throughout last week. Patient is currently asymptomatic. Denies fever/chills, cough, sore throat, shortness of breath or wheezing, muscle or body aches, headaches, GI symptoms, rash. No history of pulmonary disease. Patient has a suppressed immune system, had a liver transplant last 04/2019.     PAST MEDICAL HISTORY:   Past Medical History:   Diagnosis Date     Acute conjunctivitis, unspecified 11/04/2000     Chronic pain     " back pain     Diabetes mellitus (H)      Gout      Hyperlipidemia      Hypertension      Special screening for malignant neoplasms, colon 02/16/2009       PAST SURGICAL HISTORY:   Past Surgical History:   Procedure Laterality Date     COLONOSCOPY       PHACOEMULSIFICATION WITH STANDARD INTRAOCULAR LENS IMPLANT  6/10/2013    Procedure: PHACOEMULSIFICATION WITH STANDARD INTRAOCULAR LENS IMPLANT;  CATARACT EXTRACTION WITH INTRA OCLULAR LENS LEFT;  Surgeon: Benoit Philip MD;  Location: [facility]     PHACOEMULSIFICATION WITH STANDARD INTRAOCULAR LENS IMPLANT  7/23/2013    Procedure: PHACOEMULSIFICATION WITH STANDARD INTRAOCULAR LENS IMPLANT;  CATARACT EXTRACTION WITH INTRA OCULAR LENS RIGHT;  Surgeon: Benoit Philip MD;  Location: [facility]       FAMILY HISTORY:   Family History   Problem Relation Age of Onset     Cardiovascular Father         MI       SOCIAL HISTORY:   Social History     Tobacco Use     Smoking status: Never Smoker     Smokeless tobacco: Never Used   Substance Use Topics     Alcohol use: No     Comment: H/O abuse and treatment, sober for 2 yrs.         Allergies   Allergen Reactions     Codeine Nausea     Codeine Camsylate      Current Outpatient Medications   Medication     amLODIPine (NORVASC) 5 MG tablet     aspirin (ASA) 81 MG chewable tablet     tacrolimus (ASTAGRAF XL) 1 MG 24 hr capsule     No current facility-administered medications for this visit.          Review of Systems   Constitutional, HEENT, cardiovascular, pulmonary, gi and gu systems are negative, except as otherwise noted.       Objective          Vitals:  No vitals were obtained today due to virtual visit.    healthy, alert and no distress  PSYCH: Alert and oriented times 3; coherent speech, normal   rate and volume, able to articulate logical thoughts, able   to abstract reason, no tangential thoughts, no hallucinations   or delusions  His affect is normal  RESP: No cough, no audible wheezing, able to talk in full sentences  Remainder  of exam unable to be completed due to telephone visits            Assessment/Plan:  1. COVID-19 ruled out      Patient meets criteria for COVID-19 testing. They are informed to self quarantine until results are available. They have been provided information to complete curbside testing. Will notify with results. If positive, patient is to self-quarantine at home for 14 days.     Phone call duration:  8 minutes

## 2020-09-14 NOTE — NURSING NOTE
Patient's neighbor tested positive for COVID on Saturday. Patient states that he had contact with her last week.  Maria Luisa Wynn LPN ....................  9/14/2020   9:12 AM

## 2020-09-14 NOTE — PATIENT INSTRUCTIONS
"Discharge Instructions for COVID-19 Patients  You have--or may have--COVID-19. Please follow the instructions listed below.   If you have a weakened immune system, discuss with your doctor any other actions you need to take.  How can I protect others?  If you have symptoms (fever, cough, body aches or trouble breathing):    Stay home and away from others (self-isolate) until:  ? At least 10 days have passed since your symptoms started. And   ? You've had no fever--and no medicine that reduces fever--for 1 full day (24 hours). And   ? Your other symptoms have resolved (gotten better).  If you don't show symptoms, but testing showed that you have COVID-19:    Stay home and away from others (self-isolate) until at least 10 days have passed since the date of your first positive COVID-19 test.  During this time    Stay in your own room, even for meals. Use your own bathroom if you can.    Stay away from others in your home. No hugging, kissing or shaking hands. No visitors.    Don't go to work, school or anywhere else.    Clean \"high touch\" surfaces often (doorknobs, counters, handles). Use household cleaning spray or wipes. You'll find a full list of  on the EPA website: www.epa.gov/pesticide-registration/list-n-disinfectants-use-against-sars-cov-2.    Cover your mouth and nose with a mask or other face covering to avoid spreading germs.    Wash your hands and face often. Use soap and water.    Caregivers in these groups are at risk for severe illness due to COVID-19:  ? People 65 years and older  ? People who live in a nursing home or long-term care facility  ? People with chronic disease (lung, heart, cancer, diabetes, kidney, liver, immunologic)  ? People who have a weakened immune system, including those who:    Are in cancer treatment    Take medicine that weakens the immune system, such as corticosteroids    Had a bone marrow or organ transplant    Have an immune deficiency    Have poorly controlled HIV or " AIDS    Are obese (body mass index of 40 or higher)    Smoke regularly    Caregivers should wear gloves while washing dishes, handling laundry and cleaning bedrooms and bathrooms.    Use caution when washing and drying laundry: Don't shake dirty laundry and use the warmest water setting that you can.    For more tips on managing your health at home, go to www.cdc.gov/coronavirus/2019-ncov/downloads/10Things.pdf.  How can I take care of myself at home?  1. Get lots of rest. Drink extra fluids (unless a doctor has told you not to).  2. Take Tylenol (acetaminophen) for fever or pain. If you have liver or kidney problems, ask your family doctor if it's okay to take Tylenol.     Adults can take either:  ? 650 mg (two 325 mg pills) every 4 to 6 hours, or   ? 1,000 mg (two 500 mg pills) every 8 hours as needed.  ? Note: Don't take more than 3,000 mg in one day. Acetaminophen is found in many medicines (both prescribed and over-the-counter medicines). Read all labels to be sure you don't take too much.   For children, check the Tylenol bottle for the right dose. The dose is based on the child's age or weight.  3. If you have other health problems (like cancer, heart failure, an organ transplant or severe kidney disease): Call your specialty clinic if you don't feel better in the next 2 days.  4. Know when to call 911. Emergency warning signs include:  ? Trouble breathing or shortness of breath  ? Pain or pressure in the chest that doesn't go away  ? Feeling confused like you haven't felt before, or not being able to wake up  ? Bluish-colored lips or face  5. Your doctor may have prescribed a blood thinner medicine. Follow their instructions.  Where can I get more information?     [facility] - About COVID-19: [email or web address]    CDC - What to Do If You're Sick: www.cdc.gov/coronavirus/2019-ncov/about/steps-when-sick.html    CDC - Ending Home Isolation:  www.cdc.gov/coronavirus/2019-ncov/hcp/disposition-in-home-patients.html    CDC - Caring for Someone: www.cdc.gov/coronavirus/2019-ncov/if-you-are-sick/care-for-someone.html    [facility] - Interim Guidance for Hospital Discharge to Home: www.[facility].[place]/diseases/coronavirus/hcp/hospdischarge.pdf    [facility] clinical trials (COVID-19 research studies): clinicalaffairs.[facility]-clinical-trials    Below are the COVID-19 hotlines at the [place] Department of [facility] ([facility]). Interpreters are available.  ? For health questions: Call 853-284-7720 or 1-221.660.1237 (7 a.m. to 7 p.m.)  ? For questions about schools and childcare: Call 691-745-8361 or 1-911.137.4084 (7 a.m. to 7 p.m.)    For informational purposes only. Not to replace the advice of your health care provider. Clinically reviewed by the Infection Prevention Team. Copyright   2020 [facility]. All rights reserved. [employer] 221199 - REV 08/04/20.

## 2020-09-14 NOTE — NURSING NOTE
Patient swabbed for COVID-19 testing for exposure.  Khushboo Natarajan LPN on 9/14/2020 at 3:26 PM

## 2020-09-16 LAB
SARS-COV-2 RNA SPEC QL NAA+PROBE: NOT DETECTED
SPECIMEN SOURCE: NORMAL

## 2020-09-28 DIAGNOSIS — Z94.4 TRANSPLANTED LIVER (H): Primary | ICD-10-CM

## 2020-09-28 DIAGNOSIS — Z79.899 ENCOUNTER FOR LONG-TERM (CURRENT) USE OF OTHER MEDICATIONS: ICD-10-CM

## 2020-09-30 ENCOUNTER — OFFICE VISIT (OUTPATIENT)
Dept: FAMILY MEDICINE | Facility: OTHER | Age: 70
End: 2020-09-30
Attending: FAMILY MEDICINE
Payer: MEDICARE

## 2020-09-30 VITALS
BODY MASS INDEX: 29.33 KG/M2 | HEART RATE: 80 BPM | WEIGHT: 198 LBS | OXYGEN SATURATION: 97 % | SYSTOLIC BLOOD PRESSURE: 132 MMHG | RESPIRATION RATE: 18 BRPM | DIASTOLIC BLOOD PRESSURE: 88 MMHG | TEMPERATURE: 97.1 F | HEIGHT: 69 IN

## 2020-09-30 DIAGNOSIS — Z00.00 LABORATORY EXAMINATION ORDERED AS PART OF A ROUTINE GENERAL MEDICAL EXAMINATION: ICD-10-CM

## 2020-09-30 DIAGNOSIS — G62.9 POLYNEUROPATHY: ICD-10-CM

## 2020-09-30 DIAGNOSIS — I10 ESSENTIAL HYPERTENSION: ICD-10-CM

## 2020-09-30 DIAGNOSIS — Z94.4 TRANSPLANTED LIVER (H): ICD-10-CM

## 2020-09-30 DIAGNOSIS — N18.30 CKD (CHRONIC KIDNEY DISEASE) STAGE 3, GFR 30-59 ML/MIN (H): ICD-10-CM

## 2020-09-30 DIAGNOSIS — E11.9 TYPE 2 DIABETES MELLITUS WITHOUT COMPLICATION, WITHOUT LONG-TERM CURRENT USE OF INSULIN (H): ICD-10-CM

## 2020-09-30 DIAGNOSIS — Z94.4 LIVER REPLACED BY TRANSPLANT (H): Primary | ICD-10-CM

## 2020-09-30 DIAGNOSIS — F10.20 ALCOHOLISM (H): ICD-10-CM

## 2020-09-30 DIAGNOSIS — Z23 NEEDS FLU SHOT: ICD-10-CM

## 2020-09-30 DIAGNOSIS — Z79.899 ENCOUNTER FOR LONG-TERM (CURRENT) USE OF OTHER MEDICATIONS: ICD-10-CM

## 2020-09-30 LAB
ALBUMIN SERPL-MCNC: 4.2 G/DL (ref 3.5–5.7)
ALP SERPL-CCNC: 131 U/L (ref 34–104)
ALT SERPL W P-5'-P-CCNC: 16 U/L (ref 7–52)
ANION GAP SERPL CALCULATED.3IONS-SCNC: 8 MMOL/L (ref 3–14)
AST SERPL W P-5'-P-CCNC: 17 U/L (ref 13–39)
BASOPHILS # BLD AUTO: 0 10E9/L (ref 0–0.2)
BASOPHILS NFR BLD AUTO: 0.3 %
BILIRUB SERPL-MCNC: 0.6 MG/DL (ref 0.3–1)
BUN SERPL-MCNC: 19 MG/DL (ref 7–25)
CALCIUM SERPL-MCNC: 8.9 MG/DL (ref 8.6–10.3)
CHLORIDE SERPL-SCNC: 107 MMOL/L (ref 98–107)
CO2 SERPL-SCNC: 23 MMOL/L (ref 21–31)
CREAT SERPL-MCNC: 1.52 MG/DL (ref 0.7–1.3)
DIFFERENTIAL METHOD BLD: ABNORMAL
EOSINOPHIL # BLD AUTO: 0.2 10E9/L (ref 0–0.7)
EOSINOPHIL NFR BLD AUTO: 3 %
ERYTHROCYTE [DISTWIDTH] IN BLOOD BY AUTOMATED COUNT: 13.3 % (ref 10–15)
GFR SERPL CREATININE-BSD FRML MDRD: 45 ML/MIN/{1.73_M2}
GLUCOSE SERPL-MCNC: 124 MG/DL (ref 70–105)
HCT VFR BLD AUTO: 42.2 % (ref 40–53)
HGB BLD-MCNC: 14.2 G/DL (ref 13.3–17.7)
IMM GRANULOCYTES # BLD: 0 10E9/L (ref 0–0.4)
IMM GRANULOCYTES NFR BLD: 0.6 %
LYMPHOCYTES # BLD AUTO: 0.6 10E9/L (ref 0.8–5.3)
LYMPHOCYTES NFR BLD AUTO: 8.4 %
MCH RBC QN AUTO: 28.3 PG (ref 26.5–33)
MCHC RBC AUTO-ENTMCNC: 33.6 G/DL (ref 31.5–36.5)
MCV RBC AUTO: 84 FL (ref 78–100)
MONOCYTES # BLD AUTO: 0.6 10E9/L (ref 0–1.3)
MONOCYTES NFR BLD AUTO: 9.3 %
NEUTROPHILS # BLD AUTO: 5.1 10E9/L (ref 1.6–8.3)
NEUTROPHILS NFR BLD AUTO: 78.4 %
PLATELET # BLD AUTO: 162 10E9/L (ref 150–450)
POTASSIUM SERPL-SCNC: 4.9 MMOL/L (ref 3.5–5.1)
PROT SERPL-MCNC: 6.9 G/DL (ref 6.4–8.9)
RBC # BLD AUTO: 5.01 10E12/L (ref 4.4–5.9)
SODIUM SERPL-SCNC: 138 MMOL/L (ref 134–144)
WBC # BLD AUTO: 6.6 10E9/L (ref 4–11)

## 2020-09-30 PROCEDURE — 90662 IIV NO PRSV INCREASED AG IM: CPT

## 2020-09-30 PROCEDURE — G0008 ADMIN INFLUENZA VIRUS VAC: HCPCS

## 2020-09-30 PROCEDURE — G0463 HOSPITAL OUTPT CLINIC VISIT: HCPCS | Mod: 25

## 2020-09-30 PROCEDURE — 80053 COMPREHEN METABOLIC PANEL: CPT | Mod: ZL | Performed by: INTERNAL MEDICINE

## 2020-09-30 PROCEDURE — 36415 COLL VENOUS BLD VENIPUNCTURE: CPT | Mod: ZL | Performed by: INTERNAL MEDICINE

## 2020-09-30 PROCEDURE — 85025 COMPLETE CBC W/AUTO DIFF WBC: CPT | Mod: ZL | Performed by: INTERNAL MEDICINE

## 2020-09-30 PROCEDURE — 99215 OFFICE O/P EST HI 40 MIN: CPT | Performed by: FAMILY MEDICINE

## 2020-09-30 RX ORDER — GABAPENTIN 300 MG/1
300 CAPSULE ORAL
Qty: 60 CAPSULE | Refills: 11 | Status: SHIPPED | OUTPATIENT
Start: 2020-09-30 | End: 2021-09-02

## 2020-09-30 ASSESSMENT — MIFFLIN-ST. JEOR: SCORE: 1630.56

## 2020-09-30 ASSESSMENT — PAIN SCALES - GENERAL: PAINLEVEL: SEVERE PAIN (6)

## 2020-09-30 NOTE — PROGRESS NOTES
"Nursing Notes:   Suri Mcduffie LPN  2020  8:25 AM  Signed  Chief Complaint   Patient presents with     Establish Care       Initial /88   Pulse 80   Temp 97.1  F (36.2  C) (Temporal)   Resp 18   Ht 1.74 m (5' 8.5\")   Wt 89.8 kg (198 lb)   SpO2 97%   BMI 29.67 kg/m   Estimated body mass index is 29.67 kg/m  as calculated from the following:    Height as of this encounter: 1.74 m (5' 8.5\").    Weight as of this encounter: 89.8 kg (198 lb).  Medication Reconciliation: complete    Suri Mcduffie LPN    SUBJECTIVE:  Ramo Lockwood  is a 70 year old male who comes in today to establish care.  He previously got his care in [place].  He had alcoholic cirrhosis and hepatocellular carcinoma and underwent  donor liver transplant in 2019 at [facility].  He had embolization then microwave ablation x2 in the past and had ascites and portal vein thrombosis and varices prior to his transplant.    He has type 2 diabetes.  His hemoglobin A1c was 5.4% in July.  His last lipids were normal in July.  His thyroid was normal in July as was his comprehensive metabolic panel.  He is followed closely by the transplant team at [facility].  His last virtual visit with him was in July.  He has chronic kidney disease stage III.    He has hypertension.  He is currently on amlodipine 5 mg daily 81 mg of aspirin daily and tacrolimus 1 mg 24-hour extended release capsule daily.  He was tested 2 weeks ago for Covid-19 because of a possible exposure.    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 He grew up and lived in [place] until recently and moved to [place] to be closer to his daughter Amy Islas (49).  Bhaskar is his son who lives in [place] is 45.     He is up-to-date on immunizations and is due for his flu shot.  He had colonoscopy in the past.    He gets some numbness and tingling and soreness of his feet.     Past Medical, Family, and Social History reviewed and updated as " noted below.   ROS is negative except as noted above       Allergies   Allergen Reactions     Codeine Nausea     Codeine Camsylate    ,   Family History   Problem Relation Age of Onset     Cardiovascular Father         MI   ,   Current Outpatient Medications   Medication     amLODIPine (NORVASC) 5 MG tablet     aspirin (ASA) 81 MG chewable tablet     gabapentin (NEURONTIN) 300 MG capsule     tacrolimus (ASTAGRAF XL) 1 MG 24 hr capsule     No current facility-administered medications for this visit.    ,   Past Medical History:   Diagnosis Date     Acute conjunctivitis, unspecified 11/04/2000     Alcoholic cirrhosis (H)      Chronic pain     back pain     Diabetes mellitus (H)      Gout      Hepatocellular carcinoma (H)      Hyperlipidemia      Hypertension      Special screening for malignant neoplasms, colon 02/16/2009   ,   Patient Active Problem List    Diagnosis Date Noted     Liver replaced by transplant due to alcoholic cirrhosis and HCC 09/30/2020     Priority: Medium     CKD (chronic kidney disease) stage 3, GFR 30-59 ml/min 09/30/2020     Priority: Medium     Alcoholism (H), in remission 09/30/2020     Priority: Medium     Influenza A 01/15/2018     Priority: Medium     Hyponatremia 06/05/2015     Priority: Medium     C. 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     Priority: Medium     Diarrhea 04/19/2015     Priority: Medium     Seizure (H) 04/19/2015     Priority: Medium     Nondependent alcohol abuse, continuous drinking behavior 05/18/2014     Priority: Medium     Overview:   IMO Update 10/11       GI bleed 05/18/2014     Priority: Medium     Alcoholic cirrhosis of liver (H) 05/18/2014     Priority: Medium     Ascites 05/18/2014     Priority: Medium     Chronic hyponatremia 05/18/2014     Priority: Medium     Thrombocytopenia (H) 05/18/2014     Priority: Medium     Elevated INR 05/18/2014     Priority: Medium     Anemia 02/17/2014     Priority:  "Medium     Acute maxillary sinusitis 2013     Priority: Medium     Epistaxis 2013     Priority: Medium     Hyperlipidemia 2011     Priority: Medium     Overview:   IMO Update 10/11       Type II diabetes mellitus (H) 2011     Priority: Medium     Overview:   IMO Update 10/11       Neoplasm of uncertain behavior of lip, oral cavity, and pharynx 2006     Priority: Medium     Gout 2006     Priority: Medium     Essential hypertension 2006     Priority: Medium     Degeneration of lumbar or lumbosacral intervertebral disc 2005     Priority: Medium     Overview:   Chronic back syndrome.  PT.  L-spine and T-spine x-rays. L-spine MRI 9/3/02. Lumbar degenerative disc disease. [facility]-.  IMO Update 10/11     ,   Past Surgical History:   Procedure Laterality Date     COLONOSCOPY       HERNIA REPAIR, UMBILICAL  10/02/2015     PHACOEMULSIFICATION WITH STANDARD INTRAOCULAR LENS IMPLANT  6/10/2013    Procedure: PHACOEMULSIFICATION WITH STANDARD INTRAOCULAR LENS IMPLANT;  CATARACT EXTRACTION WITH INTRA OCLULAR LENS LEFT;  Surgeon: Benoit Philip MD;  Location: [facility] OR     PHACOEMULSIFICATION WITH STANDARD INTRAOCULAR LENS IMPLANT  2013    Procedure: PHACOEMULSIFICATION WITH STANDARD INTRAOCULAR LENS IMPLANT;  CATARACT EXTRACTION WITH INTRA OCULAR LENS RIGHT;  Surgeon: Benoit Phliip MD;  Location: [facility] OR     TRANSPLANT LIVER RECIPIENT  DONOR  2019    [facility]     umbilical hernia repair with mesh  2019    [facility]    and   Social History     Tobacco Use     Smoking status: Never Smoker     Smokeless tobacco: Never Used   Substance Use Topics     Alcohol use: No     Comment: H/O abuse and treatment, sober for 2 yrs.      OBJECTIVE:  /88   Pulse 80   Temp 97.1  F (36.2  C) (Temporal)   Resp 18   Ht 1.74 m (5' 8.5\")   Wt 89.8 kg (198 lb)   SpO2 97%   BMI 29.67 kg/m     EXAM:  General Appearance: Pleasant, alert, appropriate appearance for age. No " acute distress  Head Exam: Normal. Normocephalic, atraumatic.  Eye Exam:  Normal external eyes, conjunctivae, lids, cornea. DIANE. EOMI  Ear Exam: Normal TM's bilaterally. Normal auditory canals and external ears. Non-tender.  Nose Exam: Normal external nose, mucus membranes, and septum.  OroPharynx Exam:  Dental hygiene adequate. Normal buccal mucosa. Normal pharynx.  Neck Exam:  Supple, no masses or nodes. No audible bruits  Thyroid Exam: No nodules or enlargement.  Chest/Respiratory Exam: Normal chest wall and respirations. Clear to auscultation.  Cardiovascular Exam: Regular rate and rhythm. S1, S2, no murmur, click, gallop, or rubs.  Gastrointestinal Exam: Soft, non-tender, no masses or organomegaly.  Well-healed transplant incision in the upper abdomen.  Lymphatic Exam: Non-palpable nodes in neck, clavicular regions.  Musculoskeletal Exam: Back is straight and non-tender, full ROM of upper and lower extremities.  Negative Tinel's and Phalen signs bilaterally.  Arthritic changes in both hands noted  Foot Exam: Left and right foot: good pedal pulses, no lesions, nail hygiene good. Normal sensory testing with #10 monofilament.  Skin: no rash or abnormalities  Neurologic Exam: Nonfocal, normal gross motor, tone coordination and no tremor.  Psychiatric Exam: Alert and oriented - appropriate affect.     Results for orders placed or performed in visit on 09/30/20   Comprehensive metabolic panel     Status: Abnormal   Result Value Ref Range    Sodium 138 134 - 144 mmol/L    Potassium 4.9 3.5 - 5.1 mmol/L    Chloride 107 98 - 107 mmol/L    Carbon Dioxide 23 21 - 31 mmol/L    Anion Gap 8 3 - 14 mmol/L    Glucose 124 (H) 70 - 105 mg/dL    Urea Nitrogen 19 7 - 25 mg/dL    Creatinine 1.52 (H) 0.70 - 1.30 mg/dL    GFR Estimate 45 (L) >60 mL/min/[1.73_m2]    GFR Estimate If Black 55 (L) >60 mL/min/[1.73_m2]    Calcium 8.9 8.6 - 10.3 mg/dL    Bilirubin Total 0.6 0.3 - 1.0 mg/dL    Albumin 4.2 3.5 - 5.7 g/dL    Protein Total  6.9 6.4 - 8.9 g/dL    Alkaline Phosphatase 131 (H) 34 - 104 U/L    ALT 16 7 - 52 U/L    AST 17 13 - 39 U/L   CBC and Differential     Status: Abnormal   Result Value Ref Range    WBC 6.6 4.0 - 11.0 10e9/L    RBC Count 5.01 4.4 - 5.9 10e12/L    Hemoglobin 14.2 13.3 - 17.7 g/dL    Hematocrit 42.2 40.0 - 53.0 %    MCV 84 78 - 100 fl    MCH 28.3 26.5 - 33.0 pg    MCHC 33.6 31.5 - 36.5 g/dL    RDW 13.3 10.0 - 15.0 %    Platelet Count 162 150 - 450 10e9/L    Diff Method Automated Method     % Neutrophils 78.4 %    % Lymphocytes 8.4 %    % Monocytes 9.3 %    % Eosinophils 3.0 %    % Basophils 0.3 %    % Immature Granulocytes 0.6 %    Absolute Neutrophil 5.1 1.6 - 8.3 10e9/L    Absolute Lymphocytes 0.6 (L) 0.8 - 5.3 10e9/L    Absolute Monocytes 0.6 0.0 - 1.3 10e9/L    Absolute Eosinophils 0.2 0.0 - 0.7 10e9/L    Absolute Basophils 0.0 0.0 - 0.2 10e9/L    Abs Immature Granulocytes 0.0 0 - 0.4 10e9/L      ASSESSMENT/Plan :    Ramo was seen today for establish care.    Diagnoses and all orders for this visit:    Liver replaced by transplant due to alcoholic cirrhosis and HCC    Type 2 diabetes mellitus without complication, without long-term current use of insulin (H)    Essential hypertension    Alcoholism (H), in remission    CKD (chronic kidney disease) stage 3, GFR 30-59 ml/min    Needs flu shot  -     [facility] IMM-  FLU VACCINE, INCREASED ANTIGEN, PRESV FREE    Polyneuropathy  -     gabapentin (NEURONTIN) 300 MG capsule; Take 1 capsule (300 mg) by mouth 2 times daily    Transplanted liver (H)  -     Cancel: Tacrolimus level  -     Comprehensive metabolic panel  -     CBC and Differential    Encounter for long-term (current) use of other medications  -     Cancel: Tacrolimus level  -     Comprehensive metabolic panel  -     CBC and Differential    Laboratory examination ordered as part of a routine general medical examination  -     HANDLING CHARGE TO REF LAB      He had labs done today for his transplant physicians.  He will  continue to follow with [facility].    Is bothered by neuropathy in his feet.  Some starting in his hands.  Somewhat of a stocking glove distribution.  Discussed gabapentin and elected to go ahead with that but will start with 300 mg twice daily.  Checked to make sure there is no drug interaction with his current medications and there is not.  He will keep in touch with his progress and advised that this may take a month or so to really notice a difference.  Discussed potentially increasing the dose over time.    Flu vaccine today.    A total of 40 minutes was spent with the patient, greater than 50% of the time was spent in counseling/discussion of the aforementioned concerns.     Jim Díaz MD

## 2020-09-30 NOTE — NURSING NOTE
"Chief Complaint   Patient presents with     Establish Care       Initial /88   Pulse 80   Temp 97.1  F (36.2  C) (Temporal)   Resp 18   Ht 1.74 m (5' 8.5\")   Wt 89.8 kg (198 lb)   SpO2 97%   BMI 29.67 kg/m   Estimated body mass index is 29.67 kg/m  as calculated from the following:    Height as of this encounter: 1.74 m (5' 8.5\").    Weight as of this encounter: 89.8 kg (198 lb).  Medication Reconciliation: complete    Suri Mcduffie LPN  "

## 2020-12-18 ENCOUNTER — OFFICE VISIT (OUTPATIENT)
Dept: FAMILY MEDICINE | Facility: OTHER | Age: 70
End: 2020-12-18
Attending: FAMILY MEDICINE
Payer: MEDICARE

## 2020-12-18 VITALS
WEIGHT: 204.6 LBS | DIASTOLIC BLOOD PRESSURE: 82 MMHG | SYSTOLIC BLOOD PRESSURE: 130 MMHG | BODY MASS INDEX: 30.66 KG/M2 | HEART RATE: 84 BPM | RESPIRATION RATE: 20 BRPM | OXYGEN SATURATION: 97 % | TEMPERATURE: 98.7 F

## 2020-12-18 DIAGNOSIS — H93.13 TINNITUS, BILATERAL: Primary | ICD-10-CM

## 2020-12-18 PROCEDURE — 99213 OFFICE O/P EST LOW 20 MIN: CPT | Performed by: FAMILY MEDICINE

## 2020-12-18 PROCEDURE — G0463 HOSPITAL OUTPT CLINIC VISIT: HCPCS

## 2020-12-18 ASSESSMENT — PAIN SCALES - GENERAL: PAINLEVEL: MILD PAIN (3)

## 2020-12-18 NOTE — PATIENT INSTRUCTIONS
Patient Education     Tinnitus (Ringing in the Ears)     Treatment may include maskers and hearing aids.   Tinnitus is the term for a noise in your ear not caused by an outside sound. The noise might be a ringing, clicking, hiss, or roar. It can vary in pitch. It may be soft or very loud. For some people, this is a minor problem. But for others, the noise can make it hard to hear, work, and even sleep. When tinnitus can't be cured, treatments may help.   What causes tinnitus?  Loud noises, hearing loss, and earwax can cause tinnitus. So can certain medicines. Large amounts of aspirin or caffeine are sometimes to blame. In many cases, the exact cause of tinnitus is not known.   How is tinnitus treated?  Finding and removing the cause is the best way to treat tinnitus. So your healthcare provider may refer you to an ear, nose, and throat doctor (ENT or otolaryngologist). Your hearing may also be checked by a hearing specialist (audiologist). If you have hearing loss, wearing a hearing aid may help your tinnitus. When the cause can't be found, the tinnitus itself may be treated. Some of the treatments are listed below. Your healthcare provider can tell you more about them:     Maskers. These are small devices that look like hearing aids. They have a pleasant sound that helps cover up the ringing in your ears. Hearing aids and maskers are sometimes used together.    Medicines that treat anxiety and depression. These may ease tinnitus in some people.    Hypnosis or relaxation therapy. This may help head noise seem less severe.    Tinnitus retraining therapy. This combines counseling and maskers. Both can help take your mind off the tinnitus.  To learn more    American Speech-Hearing-Language Association [phone] www.marcell.org    American Tinnitus Association [phone] www.indira.org    National [place] on Deafness and other Communication Disorders 329-643-4572 www.nidcd.nih.gov/    [employer] last reviewed this  educational content on 9/1/2019 2000-2020 [employer]. [address] [place] [address], [place] [address]. All rights reserved. This information is not intended as a substitute for professional medical care. Always follow your healthcare professional's instructions.

## 2020-12-18 NOTE — NURSING NOTE
Patient is here for concerns of ringing in his ears. States has been about a week. Has a sinus cold last week with ear ache, that went away but still has ringing.     Medication Reconciliation: complete    Rachael Snider LPN  12/18/2020 12:01 PM

## 2020-12-23 DIAGNOSIS — Z79.899 ENCOUNTER FOR LONG-TERM (CURRENT) USE OF OTHER MEDICATIONS: ICD-10-CM

## 2020-12-23 DIAGNOSIS — Z94.4 TRANSPLANTED LIVER (H): ICD-10-CM

## 2020-12-23 LAB
ALBUMIN SERPL-MCNC: 4.2 G/DL (ref 3.5–5.7)
ALP SERPL-CCNC: 121 U/L (ref 34–104)
ALT SERPL W P-5'-P-CCNC: 25 U/L (ref 7–52)
ANION GAP SERPL CALCULATED.3IONS-SCNC: 6 MMOL/L (ref 3–14)
AST SERPL W P-5'-P-CCNC: 20 U/L (ref 13–39)
BASOPHILS # BLD AUTO: 0 10E9/L (ref 0–0.2)
BASOPHILS NFR BLD AUTO: 0.2 %
BILIRUB SERPL-MCNC: 0.7 MG/DL (ref 0.3–1)
BUN SERPL-MCNC: 20 MG/DL (ref 7–25)
CALCIUM SERPL-MCNC: 8.9 MG/DL (ref 8.6–10.3)
CHLORIDE SERPL-SCNC: 106 MMOL/L (ref 98–107)
CO2 SERPL-SCNC: 24 MMOL/L (ref 21–31)
CREAT SERPL-MCNC: 1.53 MG/DL (ref 0.7–1.3)
DIFFERENTIAL METHOD BLD: NORMAL
EOSINOPHIL # BLD AUTO: 0.2 10E9/L (ref 0–0.7)
EOSINOPHIL NFR BLD AUTO: 1.8 %
ERYTHROCYTE [DISTWIDTH] IN BLOOD BY AUTOMATED COUNT: 13.2 % (ref 10–15)
GFR SERPL CREATININE-BSD FRML MDRD: 45 ML/MIN/{1.73_M2}
GLUCOSE SERPL-MCNC: 128 MG/DL (ref 70–105)
HCT VFR BLD AUTO: 43.4 % (ref 40–53)
HGB BLD-MCNC: 14.6 G/DL (ref 13.3–17.7)
IMM GRANULOCYTES # BLD: 0 10E9/L (ref 0–0.4)
IMM GRANULOCYTES NFR BLD: 0.5 %
LYMPHOCYTES # BLD AUTO: 0.9 10E9/L (ref 0.8–5.3)
LYMPHOCYTES NFR BLD AUTO: 11.5 %
MCH RBC QN AUTO: 28.2 PG (ref 26.5–33)
MCHC RBC AUTO-ENTMCNC: 33.6 G/DL (ref 31.5–36.5)
MCV RBC AUTO: 84 FL (ref 78–100)
MONOCYTES # BLD AUTO: 0.7 10E9/L (ref 0–1.3)
MONOCYTES NFR BLD AUTO: 8.3 %
NEUTROPHILS # BLD AUTO: 6.4 10E9/L (ref 1.6–8.3)
NEUTROPHILS NFR BLD AUTO: 77.7 %
PLATELET # BLD AUTO: 161 10E9/L (ref 150–450)
POTASSIUM SERPL-SCNC: 4.8 MMOL/L (ref 3.5–5.1)
PROT SERPL-MCNC: 6.8 G/DL (ref 6.4–8.9)
RBC # BLD AUTO: 5.17 10E12/L (ref 4.4–5.9)
SODIUM SERPL-SCNC: 136 MMOL/L (ref 134–144)
WBC # BLD AUTO: 8.2 10E9/L (ref 4–11)

## 2020-12-23 PROCEDURE — 85025 COMPLETE CBC W/AUTO DIFF WBC: CPT | Mod: ZL | Performed by: INTERNAL MEDICINE

## 2020-12-23 PROCEDURE — 36415 COLL VENOUS BLD VENIPUNCTURE: CPT | Mod: ZL | Performed by: INTERNAL MEDICINE

## 2020-12-23 PROCEDURE — 80053 COMPREHEN METABOLIC PANEL: CPT | Mod: ZL | Performed by: INTERNAL MEDICINE

## 2020-12-24 NOTE — PROGRESS NOTES
SUBJECTIVE:   Ramo Lockwood is a 70 year old male who presents to clinic today for the following health issues:  Nursing Notes:   Rachael Snider LPN  12/18/2020 12:10 PM  Signed  Patient is here for concerns of ringing in his ears. States has been about a week. Has a sinus cold last week with ear ache, that went away but still has ringing.     Medication Reconciliation: complete    Rachael Snider LPN  12/18/2020 12:01 PM      HPI    Pleasant 70-year-old male presents with increasing bilateral tinnitus.  He was experiencing some sinus pressure and cold symptoms a few weeks ago.  Those symptoms have resolved but the tinnitus has persisted.  He does have decreased hearing bilaterally that he is struggled with it for many years.  He also reports tinnitus has been present for many years but worsened since his cold.      Patient Active Problem List    Diagnosis Date Noted     Liver replaced by transplant due to alcoholic cirrhosis and HCC 09/30/2020     Priority: Medium     CKD (chronic kidney disease) stage 3, GFR 30-59 ml/min 09/30/2020     Priority: Medium     Alcoholism (H), in remission 09/30/2020     Priority: Medium     Influenza A 01/15/2018     Priority: Medium     Hyponatremia 06/05/2015     Priority: Medium     C. 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     Priority: Medium     Diarrhea 04/19/2015     Priority: Medium     Seizure (H) 04/19/2015     Priority: Medium     Nondependent alcohol abuse, continuous drinking behavior 05/18/2014     Priority: Medium     Overview:   IMO Update 10/11       GI bleed 05/18/2014     Priority: Medium     Alcoholic cirrhosis of liver (H) 05/18/2014     Priority: Medium     Ascites 05/18/2014     Priority: Medium     Chronic hyponatremia 05/18/2014     Priority: Medium     Thrombocytopenia (H) 05/18/2014     Priority: Medium     Elevated INR 05/18/2014     Priority: Medium     Anemia 02/17/2014     Priority: Medium      Acute maxillary sinusitis 12/26/2013     Priority: Medium     Epistaxis 12/24/2013     Priority: Medium     Hyperlipidemia 12/19/2011     Priority: Medium     Overview:   IMO Update 10/11       Type II diabetes mellitus (H) 03/23/2011     Priority: Medium     Overview:   IMO Update 10/11       Neoplasm of uncertain behavior of lip, oral cavity, 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  PT.  L-spine and T-spine x-rays. L-spine MRI 9/3/02. Lumbar degenerative disc disease. [facility]-.  IMO Update 10/11       Past Medical History:   Diagnosis Date     Acute conjunctivitis, unspecified 11/04/2000     Alcoholic cirrhosis (H)      Chronic pain     back pain     Diabetes mellitus (H)      Gout      Hepatocellular carcinoma (H)      Hyperlipidemia      Hypertension      Special screening for malignant neoplasms, colon 02/16/2009      Current Outpatient Medications   Medication Sig Dispense Refill     amLODIPine (NORVASC) 5 MG tablet        aspirin (ASA) 81 MG chewable tablet Take 81 mg by mouth daily       gabapentin (NEURONTIN) 300 MG capsule Take 1 capsule (300 mg) by mouth 2 times daily 60 capsule 11     tacrolimus (ASTAGRAF XL) 1 MG 24 hr capsule Take 1 mg by mouth 2 times daily          Review of Systems     OBJECTIVE:     /82 (BP Location: Right arm, Patient Position: Sitting, Cuff Size: Adult Large)   Pulse 84   Temp 98.7  F (37.1  C) (Tympanic)   Resp 20   Wt 92.8 kg (204 lb 9.6 oz)   SpO2 97%   BMI 30.66 kg/m    Body mass index is 30.66 kg/m .  Physical Exam  HENT:      Head: Normocephalic.      Right Ear: Tympanic membrane and ear canal normal. There is no impacted cerumen.      Left Ear: Tympanic membrane and ear canal normal. There is no impacted cerumen.      Nose:  Nose normal. No congestion.   Neurological:      Mental Status: He is alert.             ASSESSMENT/PLAN:           ICD-10-CM    1. Tinnitus, bilateral  H93.13 AUDIOLOGY ADULT REFERRAL       Ear exam was normal today.  He has had longstanding tinnitus and hearing loss likely age-related.  Recommend formal audiology evaluation.  He is comfortable this plan and will follow up with his primary when these results are available.  I spent over 15 minutes with the patient, greater than 50% spent in counseling and coordination of care.    Frances Khanna MD  [facility]

## 2021-01-05 ENCOUNTER — OFFICE VISIT (OUTPATIENT)
Dept: FAMILY MEDICINE | Facility: OTHER | Age: 71
End: 2021-01-05
Attending: FAMILY MEDICINE
Payer: MEDICARE

## 2021-01-05 VITALS
DIASTOLIC BLOOD PRESSURE: 80 MMHG | SYSTOLIC BLOOD PRESSURE: 128 MMHG | OXYGEN SATURATION: 98 % | TEMPERATURE: 97.8 F | WEIGHT: 207.8 LBS | HEART RATE: 86 BPM | RESPIRATION RATE: 16 BRPM | BODY MASS INDEX: 31.14 KG/M2

## 2021-01-05 DIAGNOSIS — D69.6 THROMBOCYTOPENIA (H): ICD-10-CM

## 2021-01-05 DIAGNOSIS — E11.9 TYPE 2 DIABETES MELLITUS WITHOUT COMPLICATION, WITHOUT LONG-TERM CURRENT USE OF INSULIN (H): ICD-10-CM

## 2021-01-05 DIAGNOSIS — F10.20 ALCOHOLISM (H): ICD-10-CM

## 2021-01-05 DIAGNOSIS — N18.31 STAGE 3A CHRONIC KIDNEY DISEASE (H): ICD-10-CM

## 2021-01-05 DIAGNOSIS — R56.9 SEIZURE (H): ICD-10-CM

## 2021-01-05 DIAGNOSIS — S76.212A STRAIN OF GROIN, LEFT, INITIAL ENCOUNTER: Primary | ICD-10-CM

## 2021-01-05 PROCEDURE — G0463 HOSPITAL OUTPT CLINIC VISIT: HCPCS

## 2021-01-05 PROCEDURE — 99213 OFFICE O/P EST LOW 20 MIN: CPT | Performed by: FAMILY MEDICINE

## 2021-01-05 RX ORDER — TACROLIMUS 1 MG/1
1 CAPSULE ORAL
COMMUNITY
Start: 2020-12-30 | End: 2021-01-05

## 2021-01-05 RX ORDER — TACROLIMUS 0.5 MG/1
0.5 CAPSULE ORAL EVERY 12 HOURS
COMMUNITY
Start: 2020-12-30 | End: 2021-01-05

## 2021-01-05 ASSESSMENT — PAIN SCALES - GENERAL: PAINLEVEL: SEVERE PAIN (7)

## 2021-01-05 NOTE — PROGRESS NOTES
"Nursing Notes:   Christal Morris LPN  2021  2:29 PM  Signed  Patient states he has been having soreness in his left upper thigh/groin area since . Patient states he was putting his socks on this morning and heard a popping noise. He now has a 7/10 pain in that area and has been unable to lift his left leg.  Chief Complaint   Patient presents with     Pain     left leg and groin       Initial /80 (BP Location: Left arm, Patient Position: Sitting, Cuff Size: Adult Large)   Pulse 86   Temp 97.8  F (36.6  C) (Tympanic)   Resp 16   Wt 94.3 kg (207 lb 12.8 oz)   SpO2 98%   BMI 31.14 kg/m   Estimated body mass index is 31.14 kg/m  as calculated from the following:    Height as of 20: 1.74 m (5' 8.5\").    Weight as of this encounter: 94.3 kg (207 lb 12.8 oz).  Medication Reconciliation: complete    Christal Morris LPN      SUBJECTIVE:  Ramo Lockwood  is a 70 year old male who comes in because of left upper thigh and groin pain since [place] day.  He rode on a snowmobile with his son with seem to make his legs more tight and sore.  Today, he was putting on his socks in the morning he felt a popping noise now has 7 out of 10 pain and has been unable to lift his leg.  He can walk and bear weight.  It hurts to lift it.     He had alcoholic cirrhosis and hepatocellular carcinoma and underwent  donor liver transplant in 2019 at [facility].  He had embolization then microwave ablation x2 in the past and had ascites and portal vein thrombosis and varices prior to his transplant.     He has type 2 diabetes.  His hemoglobin A1c was 5.4% in July.  His last lipids were normal in July.  His thyroid was normal in July as was his comprehensive metabolic panel.  He is followed closely by the transplant team at [facility].  His last virtual visit with him was in July.  He has chronic kidney disease stage III.     He has hypertension.  He is currently on amlodipine 5 mg daily 81 mg of " aspirin daily and tacrolimus 1 mg 24-hour extended release capsule daily.  He was tested 2 weeks ago for Covid-19 because of a possible exposure.    Past Medical, Family, and Social History reviewed and updated as noted below.   ROS is negative except as noted above       Allergies   Allergen Reactions     Codeine Nausea     Codeine Camsylate    ,   Family History   Problem Relation Age of Onset     Cardiovascular Father         MI   ,   Current Outpatient Medications   Medication     amLODIPine (NORVASC) 5 MG tablet     aspirin (ASA) 81 MG chewable tablet     gabapentin (NEURONTIN) 300 MG capsule     tacrolimus (ASTAGRAF XL) 1 MG 24 hr capsule     No current facility-administered medications for this visit.    ,   Past Medical History:   Diagnosis Date     Acute conjunctivitis, unspecified 11/04/2000     Alcoholic cirrhosis (H)      Chronic pain     back pain     Diabetes mellitus (H)      Gout      Hepatocellular carcinoma (H)      Hyperlipidemia      Hypertension      Special screening for malignant neoplasms, colon 02/16/2009   ,   Patient Active Problem List    Diagnosis Date Noted     Liver replaced by transplant due to alcoholic cirrhosis and HCC 09/30/2020     Priority: Medium     CKD (chronic kidney disease) stage 3, GFR 30-59 ml/min 09/30/2020     Priority: Medium     Alcoholism (H), in remission 09/30/2020     Priority: Medium     Influenza A 01/15/2018     Priority: Medium     Hyponatremia 06/05/2015     Priority: Medium     C. 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     Priority: Medium     Diarrhea 04/19/2015     Priority: Medium     Seizure (H) 04/19/2015     Priority: Medium     Nondependent alcohol abuse, continuous drinking behavior 05/18/2014     Priority: Medium     Overview:   IMO Update 10/11       GI bleed 05/18/2014     Priority: Medium     Alcoholic cirrhosis of liver (H) 05/18/2014     Priority: Medium     Ascites 05/18/2014      Priority: Medium     Chronic hyponatremia 2014     Priority: Medium     Thrombocytopenia (H) 2014     Priority: Medium     Elevated INR 2014     Priority: Medium     Anemia 2014     Priority: Medium     Acute maxillary sinusitis 2013     Priority: Medium     Epistaxis 2013     Priority: Medium     Hyperlipidemia 2011     Priority: Medium     Overview:   IMO Update 10/11       Type II diabetes mellitus (H) 2011     Priority: Medium     Overview:   IMO Update 10/11       Neoplasm of uncertain behavior of lip, oral cavity, and pharynx 2006     Priority: Medium     Gout 2006     Priority: Medium     Essential hypertension 2006     Priority: Medium     Degeneration of lumbar or lumbosacral intervertebral disc 2005     Priority: Medium     Overview:   Chronic back syndrome.  PT.  L-spine and T-spine x-rays. L-spine MRI 9/3/02. Lumbar degenerative disc disease. [facility].  IMO Update 10/11     ,   Past Surgical History:   Procedure Laterality Date     COLONOSCOPY       HERNIA REPAIR, UMBILICAL  10/02/2015     PHACOEMULSIFICATION WITH STANDARD INTRAOCULAR LENS IMPLANT  6/10/2013    Procedure: PHACOEMULSIFICATION WITH STANDARD INTRAOCULAR LENS IMPLANT;  CATARACT EXTRACTION WITH INTRA OCLULAR LENS LEFT;  Surgeon: Benoit Philip MD;  Location: [facility] OR     PHACOEMULSIFICATION WITH STANDARD INTRAOCULAR LENS IMPLANT  2013    Procedure: PHACOEMULSIFICATION WITH STANDARD INTRAOCULAR LENS IMPLANT;  CATARACT EXTRACTION WITH INTRA OCULAR LENS RIGHT;  Surgeon: Benoit Philip MD;  Location: [facility] OR     TRANSPLANT LIVER RECIPIENT  DONOR  2019    [facility]     umbilical hernia repair with mesh  2019    [facility]    and   Social History     Tobacco Use     Smoking status: Never Smoker     Smokeless tobacco: Never Used   Substance Use Topics     Alcohol use: No     Comment: H/O abuse and treatment, sober for 2 yrs.      OBJECTIVE:  /80 (BP  Location: Left arm, Patient Position: Sitting, Cuff Size: Adult Large)   Pulse 86   Temp 97.8  F (36.6  C) (Tympanic)   Resp 16   Wt 94.3 kg (207 lb 12.8 oz)   SpO2 98%   BMI 31.14 kg/m     EXAM:  Alert cooperative, no distress.  He has no bruising on his thigh nor does he have any bruising on the bottom of his foot.  He has tenderness to palpation over the sartorius muscle and activating that muscle causes pain.  He also is somewhat tender along the gracilis and psoas musculature.  ASSESSMENT/Plan :    Ramo was seen today for pain.    Diagnoses and all orders for this visit:    Strain of groin, left, initial encounter  -     PHYSICAL THERAPY REFERRAL; Future    Alcoholism (H)    Seizure (H)    Thrombocytopenia (H)    Type 2 diabetes mellitus without complication, without long-term current use of insulin (H)    Stage 3a chronic kidney disease      I think he has a groin strain and would benefit from ice and gentle stretching.  He is referred to physical therapy for evaluation and treatment.  Advise follow-up if worsening or not improving    Moderate decision making complexity because of his other underlying medical conditions.    Jim Díaz MD

## 2021-01-05 NOTE — NURSING NOTE
"Patient states he has been having soreness in his left upper thigh/groin area since [place]. Patient states he was putting his socks on this morning and heard a popping noise. He now has a 7/10 pain in that area and has been unable to lift his left leg.  Chief Complaint   Patient presents with     Pain     left leg and groin       Initial /80 (BP Location: Left arm, Patient Position: Sitting, Cuff Size: Adult Large)   Pulse 86   Temp 97.8  F (36.6  C) (Tympanic)   Resp 16   Wt 94.3 kg (207 lb 12.8 oz)   SpO2 98%   BMI 31.14 kg/m   Estimated body mass index is 31.14 kg/m  as calculated from the following:    Height as of 9/30/20: 1.74 m (5' 8.5\").    Weight as of this encounter: 94.3 kg (207 lb 12.8 oz).  Medication Reconciliation: complete    Christal Morris LPN    "

## 2021-01-07 ENCOUNTER — HOSPITAL ENCOUNTER (OUTPATIENT)
Dept: PHYSICAL THERAPY | Facility: OTHER | Age: 71
Setting detail: THERAPIES SERIES
End: 2021-01-07
Attending: FAMILY MEDICINE
Payer: MEDICARE

## 2021-01-07 DIAGNOSIS — S76.212A STRAIN OF GROIN, LEFT, INITIAL ENCOUNTER: ICD-10-CM

## 2021-01-07 PROCEDURE — 97110 THERAPEUTIC EXERCISES: CPT | Mod: GP | Performed by: PHYSICAL THERAPIST

## 2021-01-07 PROCEDURE — 97161 PT EVAL LOW COMPLEX 20 MIN: CPT | Mod: GP | Performed by: PHYSICAL THERAPIST

## 2021-01-07 NOTE — PROGRESS NOTES
[facility]          OUTPATIENT PHYSICAL THERAPY ORTHOPEDIC EVALUATION  PLAN OF TREATMENT FOR OUTPATIENT REHABILITATION  (COMPLETE FOR INITIAL CLAIMS ONLY)  Patient's Last Name, First Name, M.I.  YOB: 1950  Ramo Lockwood    Provider s Name:  [facility]   Medical Record No.  [id number]   Start of Care Date:  01/07/21   Onset Date:  (P) 12/26/20   Type:     _X__PT   ___OT   ___SLP Medical Diagnosis:  S76.212A (ICD-10-CM) - Strain of groin, left, initial encounter     PT Diagnosis:  (P) L hip flexor strain   Visits from SOC:  1      _________________________________________________________________________________  Plan of Treatment/Functional Goals:  (P) balance training, joint mobilization, manual therapy, motor coordination training, neuromuscular re-education, strengthening, stretching     (P) Cryotherapy, Hydrotherapy     Goals  Goal Identifier: (P) Pain  Goal Description: (P) Pt to report 0/10 pain throughout the day for improved ADLs  Target Date: (P) 02/18/21    Goal Identifier: (P) Strength  Goal Description: (P) Pt to demo 5/5 MMT at L hip, all planes, for improved strength and endurance  Target Date: (P) 03/04/21    Goal Identifier: (P) Ambulate  Goal Description: (P) Pt to ambulate community distances without increasing L hip pain for improved mobility  Target Date: (P) 03/04/21    Therapy Frequency:  (P) 2 times/Week  Predicted Duration of Therapy Intervention:  (P) 8 weeks    Ramy Herman, PT, ATC                 I CERTIFY THE NEED FOR THESE SERVICES FURNISHED UNDER        THIS PLAN OF TREATMENT AND WHILE UNDER MY CARE     (Physician co-signature of this document indicates review and certification of the therapy plan).                       Certification Date From:  01/07/21   Certification Date To:  03/04/21    Referring Provider:  Dr Díaz    Initial Assessment        See Epic Evaluation Start of Care Date: 01/07/21

## 2021-01-07 NOTE — PROGRESS NOTES
01/07/21 1400   General Information   Type of Visit Initial OP Ortho PT Evaluation   Start of Care Date 01/07/21   Referring Physician Dr Díaz   Patient/Family Goals Statement return to activity at prior level of function   Orders Evaluate and Treat   Date of Order 01/05/21   Certification Required? Yes   Medical Diagnosis S76.212A (ICD-10-CM) - Strain of groin, left, initial encounter   Surgical/Medical history reviewed Yes   Precautions/Limitations no known precautions/limitations   Weight-Bearing Status - LLE full weight-bearing   Weight-Bearing Status - RLE full weight-bearing   General Information Comments please see pt's chart for any additional information   Body Part(s)   Body Part(s) Hip   Presentation and Etiology   Pertinent history of current problem (include personal factors and/or comorbidities that impact the POC) On 12/26 he went for a snowmobile ride for the first time in 30 years and he has had L groin/hip pain ever since. Today he feels the best he has since the 26th.    Impairments A. Pain;F. Decreased strength and endurance   Functional Limitations perform activities of daily living;perform required work activities;perform desired leisure / sports activities   Symptom Location L anterior hip region   How/Where did it occur During recreation/sports   Onset date of current episode/exacerbation 12/26/20   Chronicity New   Pain rating (0-10 point scale) Best (/10);Worst (/10)   Best (/10) 1   Worst (/10) 4   Pain quality A. Sharp;B. Dull;D. Burning;E. Shooting;F. Stabbing   Frequency of pain/symptoms A. Constant   Pain/symptoms exacerbated by A. Sitting;B. Walking;D. Carrying;G. Certain positions;I. Bending;J. ADL;K. Home tasks   Pain/symptoms eased by E. Changing positions;F. Certain positions;G. Heat;H. Cold;I. OTC medication(s);C. Rest   Progression of symptoms since onset: Improved   Prior Level of Function   Prior Level of Function-Mobility Ind   Prior Level of Function-ADLs Ind  "  Current Level of Function   Current Community Support Family/friend caregiver   Patient role/employment history F. Retired  (worked in the mines)   Living environment House/townhome   Current equipment-Gait/Locomotion None   Current equipment-ADL None   Fall Risk Screen   Fall screen completed by PT   Have you fallen 2 or more times in the past year? No   Have you fallen and had an injury in the past year? No   Is patient a fall risk? No;Department fall risk interventions implemented   Abuse Screen (yes response referral indicated)   Feels Unsafe at Home or Work/School no   Feels Threatened by Someone no   Does Anyone Try to Keep You From Having Contact with Others or Doing Things Outside Your Home? no   Physical Signs of Abuse Present no   Functional Scales   Functional Scales Other   Other Scales  PSFS   Hip Objective Findings   Side (if bilateral, select both right and left) Left   Observation pt is pleasant and in no acute distress   Gait/Locomotion normal gait. pt is slow moving and cautious but states he is at baseline. he is \"full of arthritis\"   Hip ROM Comments normal hip and knee ROM bilat per pt's age and overall physical condition   Pelvic Screen good pelvic mobility   Hip Special Tests Comments no special tests warranted at this time   Left Hip Flexion Strength 4-/5, pain with resistance   Left Hip Abduction Strength 5-/5   Planned Therapy Interventions   Planned Therapy Interventions balance training;joint mobilization;manual therapy;motor coordination training;neuromuscular re-education;strengthening;stretching   Planned Modality Interventions   Planned Modality Interventions Cryotherapy;Hydrotherapy   Clinical Impression   Criteria for Skilled Therapeutic Interventions Met yes, treatment indicated   PT Diagnosis L hip flexor strain   Influenced by the following impairments L hip pain with mobility   Functional limitations due to impairments increased pain with walking community distances   Clinical " Presentation Stable/Uncomplicated   Clinical Decision Making (Complexity) Low complexity   Therapy Frequency 2 times/Week   Predicted Duration of Therapy Intervention (days/wks) 8 weeks   Risk & Benefits of therapy have been explained Yes   Patient, Family & other staff in agreement with plan of care Yes   Clinical Impression Comments exam consistent with L hip flexor/groin strain   Education Assessment   Preferred Learning Style Listening;Reading;Demonstration;Pictures/video   Barriers to Learning No barriers   ORTHO GOALS   PT Ortho Eval Goals 1;2;3   Ortho Goal 1   Goal Identifier Pain   Goal Description Pt to report 0/10 pain throughout the day for improved ADLs   Target Date 02/18/21   Ortho Goal 2   Goal Identifier Strength   Goal Description Pt to demo 5/5 MMT at L hip, all planes, for improved strength and endurance   Target Date 03/04/21   Ortho Goal 3   Goal Identifier Ambulate   Goal Description Pt to ambulate community distances without increasing L hip pain for improved mobility   Target Date 03/04/21   Total Evaluation Time   PT Eval, Low Complexity Minutes (45010) 15   Therapy Certification   Certification date from 01/07/21   Certification date to 03/04/21   Medical Diagnosis S76.212A (ICD-10-CM) - Strain of groin, left, initial encounter

## 2021-01-13 ENCOUNTER — HOSPITAL ENCOUNTER (OUTPATIENT)
Dept: PHYSICAL THERAPY | Facility: OTHER | Age: 71
Setting detail: THERAPIES SERIES
End: 2021-01-13
Attending: FAMILY MEDICINE
Payer: MEDICARE

## 2021-01-13 PROCEDURE — 97110 THERAPEUTIC EXERCISES: CPT | Mod: GP | Performed by: PHYSICAL THERAPIST

## 2021-01-19 ENCOUNTER — OFFICE VISIT (OUTPATIENT)
Dept: OTOLARYNGOLOGY | Facility: OTHER | Age: 71
End: 2021-01-19
Attending: OTOLARYNGOLOGY
Payer: MEDICARE

## 2021-01-19 DIAGNOSIS — H90.3 SNHL (SENSORY-NEURAL HEARING LOSS), ASYMMETRICAL: Primary | ICD-10-CM

## 2021-01-19 DIAGNOSIS — H93.13 TINNITUS, BILATERAL: ICD-10-CM

## 2021-01-19 PROCEDURE — G0463 HOSPITAL OUTPT CLINIC VISIT: HCPCS

## 2021-01-20 ENCOUNTER — HOSPITAL ENCOUNTER (OUTPATIENT)
Dept: PHYSICAL THERAPY | Facility: OTHER | Age: 71
Setting detail: THERAPIES SERIES
End: 2021-01-20
Attending: FAMILY MEDICINE
Payer: MEDICARE

## 2021-01-20 PROCEDURE — 97110 THERAPEUTIC EXERCISES: CPT | Mod: GP | Performed by: PHYSICAL THERAPIST

## 2021-01-20 NOTE — PROGRESS NOTES
document embedded image  Patient Name: Ramo Lockwood    Address: [address] [place] [address]     YOB: 1950    [place] [address]    MR Number: [id number]    Phone: [phone]  PCP: Frances Dubon            Appointment Date: 01/19/21   Visit Provider: Emiliano Montoya MD    cc: Frances Dubon; ~    ENT Progress Note  Visit Reasons: Ringing in Ears    HPI  History of Present Illness  Chief complaint:  Hearing loss, tinnitus    History  The patient is a 70-year-old man who comes to the office today for evaluation of increasing hearing loss and tinnitus.  He has noted over the last several years that he has had worsening hearing loss particularly in his right ear.  The tinnitus appears diffuse in his head.  He has had no disequilibrium or vertigo.  He has a history of having undergone liver transplant at the [facility] several years ago.  He believes he had his brain scanned to prior to proceeding with the liver transplant and was not told of any notable lesions.    Exam  The external auditory canals and TMs are clear bilaterally  Oral cavity oropharynx-free of lesions or inflammation  Nasal-no obstruction or purulence  Neck-no masses or adenopathy  Head neck integument-Clear  General the patient appears well and in no distress  Neuro-there are no focal cranial nerve deficits aside from his hearing  Audiogram-his hearing test demonstrates an asymmetric sensorineural hearing loss with speech reception threshold of 50 decibels in the right ear and 30 decibels in the left.  His discrimination scores are intact.  His tympanograms are normal.    A&P  Assessment & Plan  (1) SNHL (sensory-neural hearing loss), asymmetrical:        Status: Acute        Code(s):  H90.5 - Unspecified sensorineural hearing loss  (2) Tinnitus, bilateral:        Status: Acute        Code(s):  H93.13 - Tinnitus, bilateral  Additional Plan Details  Plan Details: We discussed the implications of asymmetric  sensorineural hearing loss, specifically the possibility of an acoustic neuroma.  The gold standard for testing for this would be an MRI.  I offered him MRI at this time verses continue to follow closely with serial audiograms and considering MRI if the asymmetry worsens.  For now he would like to simply check an audiogram in a year.  He will have follow-up with his doctors at the [facility] following his transplant surgery in the near future.  He will check with him to make sure he had an MRI of his brain couple of years ago, I see no reason to repeat this if it was negative a couple of years ago.  He should consider hearing aid trial.  We briefly discussed tinnitus interventions.      Emiliano Montoya MD    01/19/21 0800    <Electronically signed by Emiliano Montoya MD> 01/20/21 0711

## 2021-01-22 ENCOUNTER — HOSPITAL ENCOUNTER (OUTPATIENT)
Dept: PHYSICAL THERAPY | Facility: OTHER | Age: 71
Setting detail: THERAPIES SERIES
End: 2021-01-22
Attending: FAMILY MEDICINE
Payer: MEDICARE

## 2021-01-22 PROCEDURE — 97110 THERAPEUTIC EXERCISES: CPT | Mod: GP | Performed by: PHYSICAL THERAPIST

## 2021-02-25 NOTE — PROGRESS NOTES
Outpatient Physical Therapy Discharge Note     Patient: Ramo Lockwood  : 1950    Beginning/End Dates:  21 to 21    Referring Provider: Dr Díaz    Therapy Diagnosis: S76.212A (ICD-10-CM) - Strain of groin, left, initial encounter     Plan:  Discharge from therapy.    Discharge:   Pt called and canceled his remaining PT appointments and did not rescheduled. He stated he was feeling better and additional sessions were not needed. Please refer to pt's chart for any additional information. This is an unplanned DC    Reason for Discharge:   Patient has not scheduled further appointments.    Discharge Plan: Patient to continue home program.

## 2021-03-15 ENCOUNTER — PATIENT OUTREACH (OUTPATIENT)
Dept: FAMILY MEDICINE | Facility: OTHER | Age: 71
End: 2021-03-15

## 2021-03-15 NOTE — LETTER
March 15, 2021      Ramo Lockwood  [address] [address]      Your healthcare team cares about your health. To provide you with the best care,   we have reviewed your chart and based on our findings, we see that you are due to:     - DIABETES FOLLOW UP: Schedule a diabetic follow up appointment as a Office Visit. Patients with diabetes should generally see their provider every 3-6 months.    Schedule a DIABETIC EYE EXAM.  This exam is done with an optometrist. You can schedule this appointment with your eye doctor.  If you need a referral, please let us know.  - COLON CANCER SCREENING:  Call the clinic to schedule your colonoscopy or Cologuard test.    If you have already completed these items, please contact the clinic via phone or   Mychart so your care team can review and update your records. Thank you for   choosing [facility] for your healthcare needs. For any questions,   concerns, or to schedule an appointment please contact the clinic.       Healthy Regards,      Your [facility] Care Team          Enclosure: N/A

## 2021-03-15 NOTE — TELEPHONE ENCOUNTER
Patient Quality Outreach      Summary:    Patient has the following on his problem list/HM:   Diabetes    Last A1C:  Lab Results   Component Value Date    A1C 5.4 05/19/2014       Last LDL:    Lab Results   Component Value Date    LDL 42 01/16/2018       Is the patient on a Statin? No          Is the patient on Aspirin? Yes    Medications     Salicylates     aspirin (ASA) 81 MG chewable tablet             Last three blood pressure readings:  BP Readings from Last 3 Encounters:   01/05/21 128/80   12/18/20 130/82   09/30/20 132/88            Tobacco Use      Smoking status: Never Smoker      Smokeless tobacco: Never Used          Patient is due/failing the following:   A1C, Eye Exam and Diabetic Follow-Up Visit and Colonoscopy    Type of outreach:    Sent letter.    Questions for provider review:    None                                                                                                                                     Gretta Campbell LPN.....3/15/2021 9:44 AM

## 2021-04-06 DIAGNOSIS — Z94.4 TRANSPLANTED LIVER (H): ICD-10-CM

## 2021-04-06 DIAGNOSIS — Z79.899 ENCOUNTER FOR LONG-TERM (CURRENT) USE OF OTHER MEDICATIONS: ICD-10-CM

## 2021-04-06 DIAGNOSIS — Z01.89 EXAMINATION FOR, LABORATORY: Primary | ICD-10-CM

## 2021-04-06 DIAGNOSIS — H93.13 TINNITUS, BILATERAL: ICD-10-CM

## 2021-04-06 LAB
ALBUMIN SERPL-MCNC: 4.2 G/DL (ref 3.5–5.7)
ALP SERPL-CCNC: 106 U/L (ref 34–104)
ALT SERPL W P-5'-P-CCNC: 27 U/L (ref 7–52)
ANION GAP SERPL CALCULATED.3IONS-SCNC: 8 MMOL/L (ref 3–14)
AST SERPL W P-5'-P-CCNC: 21 U/L (ref 13–39)
BASOPHILS # BLD AUTO: 0 10E9/L (ref 0–0.2)
BASOPHILS NFR BLD AUTO: 0.1 %
BILIRUB SERPL-MCNC: 1.1 MG/DL (ref 0.3–1)
BUN SERPL-MCNC: 23 MG/DL (ref 7–25)
CALCIUM SERPL-MCNC: 8.8 MG/DL (ref 8.6–10.3)
CHLORIDE SERPL-SCNC: 105 MMOL/L (ref 98–107)
CO2 SERPL-SCNC: 23 MMOL/L (ref 21–31)
CREAT SERPL-MCNC: 1.5 MG/DL (ref 0.7–1.3)
DIFFERENTIAL METHOD BLD: ABNORMAL
EOSINOPHIL # BLD AUTO: 0.1 10E9/L (ref 0–0.7)
EOSINOPHIL NFR BLD AUTO: 1.2 %
ERYTHROCYTE [DISTWIDTH] IN BLOOD BY AUTOMATED COUNT: 13.3 % (ref 10–15)
GFR SERPL CREATININE-BSD FRML MDRD: 46 ML/MIN/{1.73_M2}
GLUCOSE SERPL-MCNC: 116 MG/DL (ref 70–105)
HCT VFR BLD AUTO: 42.6 % (ref 40–53)
HGB BLD-MCNC: 14.7 G/DL (ref 13.3–17.7)
IMM GRANULOCYTES # BLD: 0 10E9/L (ref 0–0.4)
IMM GRANULOCYTES NFR BLD: 0.4 %
LYMPHOCYTES # BLD AUTO: 0.9 10E9/L (ref 0.8–5.3)
LYMPHOCYTES NFR BLD AUTO: 12.8 %
MCH RBC QN AUTO: 28.8 PG (ref 26.5–33)
MCHC RBC AUTO-ENTMCNC: 34.5 G/DL (ref 31.5–36.5)
MCV RBC AUTO: 83 FL (ref 78–100)
MONOCYTES # BLD AUTO: 0.6 10E9/L (ref 0–1.3)
MONOCYTES NFR BLD AUTO: 8.8 %
NEUTROPHILS # BLD AUTO: 5.3 10E9/L (ref 1.6–8.3)
NEUTROPHILS NFR BLD AUTO: 76.7 %
PLATELET # BLD AUTO: 137 10E9/L (ref 150–450)
POTASSIUM SERPL-SCNC: 4.8 MMOL/L (ref 3.5–5.1)
PROT SERPL-MCNC: 6.9 G/DL (ref 6.4–8.9)
RBC # BLD AUTO: 5.11 10E12/L (ref 4.4–5.9)
SODIUM SERPL-SCNC: 136 MMOL/L (ref 134–144)
WBC # BLD AUTO: 6.9 10E9/L (ref 4–11)

## 2021-04-06 PROCEDURE — 80053 COMPREHEN METABOLIC PANEL: CPT | Mod: ZL | Performed by: INTERNAL MEDICINE

## 2021-04-06 PROCEDURE — 85025 COMPLETE CBC W/AUTO DIFF WBC: CPT | Mod: ZL | Performed by: INTERNAL MEDICINE

## 2021-04-08 ENCOUNTER — TELEPHONE (OUTPATIENT)
Dept: FAMILY MEDICINE | Facility: OTHER | Age: 71
End: 2021-04-08

## 2021-04-08 NOTE — TELEPHONE ENCOUNTER
He can simply do that when I see him for an appointment. It's too late to add it on.  Jim Díaz MD on 4/8/2021 at 4:46 PM

## 2021-04-08 NOTE — TELEPHONE ENCOUNTER
He just had blood work on 04/06/2021. Does he need an A1C?.  Suri Mcduffie LPN..................4/8/2021   3:13 PM

## 2021-04-08 NOTE — TELEPHONE ENCOUNTER
He said he needs a Tacrolimus level and that Kendall orders this for him.  Suri Mcduffie LPN..................4/8/2021   4:51 PM

## 2021-04-08 NOTE — TELEPHONE ENCOUNTER
This PT is scheduled for a lab appt tomorrow and no blood work is ordered. Please advise. Thank you, Lab

## 2021-04-09 DIAGNOSIS — Z79.899 ENCOUNTER FOR LONG-TERM (CURRENT) USE OF OTHER MEDICATIONS: ICD-10-CM

## 2021-04-09 DIAGNOSIS — Z94.4 TRANSPLANTED LIVER (H): ICD-10-CM

## 2021-04-09 LAB
ALBUMIN SERPL-MCNC: 4.3 G/DL (ref 3.5–5.7)
ALP SERPL-CCNC: 104 U/L (ref 34–104)
ALT SERPL W P-5'-P-CCNC: 27 U/L (ref 7–52)
ANION GAP SERPL CALCULATED.3IONS-SCNC: 9 MMOL/L (ref 3–14)
AST SERPL W P-5'-P-CCNC: 22 U/L (ref 13–39)
BASOPHILS # BLD AUTO: 0 10E9/L (ref 0–0.2)
BASOPHILS NFR BLD AUTO: 0.2 %
BILIRUB SERPL-MCNC: 0.8 MG/DL (ref 0.3–1)
BUN SERPL-MCNC: 19 MG/DL (ref 7–25)
CALCIUM SERPL-MCNC: 9 MG/DL (ref 8.6–10.3)
CHLORIDE SERPL-SCNC: 107 MMOL/L (ref 98–107)
CO2 SERPL-SCNC: 24 MMOL/L (ref 21–31)
CREAT SERPL-MCNC: 1.45 MG/DL (ref 0.7–1.3)
DIFFERENTIAL METHOD BLD: ABNORMAL
EOSINOPHIL # BLD AUTO: 0.1 10E9/L (ref 0–0.7)
EOSINOPHIL NFR BLD AUTO: 2.4 %
ERYTHROCYTE [DISTWIDTH] IN BLOOD BY AUTOMATED COUNT: 13.3 % (ref 10–15)
GFR SERPL CREATININE-BSD FRML MDRD: 48 ML/MIN/{1.73_M2}
GLUCOSE SERPL-MCNC: 123 MG/DL (ref 70–105)
HCT VFR BLD AUTO: 44.3 % (ref 40–53)
HGB BLD-MCNC: 15 G/DL (ref 13.3–17.7)
IMM GRANULOCYTES # BLD: 0 10E9/L (ref 0–0.4)
IMM GRANULOCYTES NFR BLD: 0.5 %
LYMPHOCYTES # BLD AUTO: 1 10E9/L (ref 0.8–5.3)
LYMPHOCYTES NFR BLD AUTO: 16.3 %
MCH RBC QN AUTO: 28.4 PG (ref 26.5–33)
MCHC RBC AUTO-ENTMCNC: 33.9 G/DL (ref 31.5–36.5)
MCV RBC AUTO: 84 FL (ref 78–100)
MONOCYTES # BLD AUTO: 0.7 10E9/L (ref 0–1.3)
MONOCYTES NFR BLD AUTO: 10.9 %
NEUTROPHILS # BLD AUTO: 4.2 10E9/L (ref 1.6–8.3)
NEUTROPHILS NFR BLD AUTO: 69.7 %
PLATELET # BLD AUTO: 144 10E9/L (ref 150–450)
POTASSIUM SERPL-SCNC: 4.6 MMOL/L (ref 3.5–5.1)
PROT SERPL-MCNC: 6.8 G/DL (ref 6.4–8.9)
RBC # BLD AUTO: 5.29 10E12/L (ref 4.4–5.9)
SODIUM SERPL-SCNC: 140 MMOL/L (ref 134–144)
WBC # BLD AUTO: 6 10E9/L (ref 4–11)

## 2021-04-09 PROCEDURE — 85025 COMPLETE CBC W/AUTO DIFF WBC: CPT | Mod: ZL | Performed by: INTERNAL MEDICINE

## 2021-04-09 PROCEDURE — 80197 ASSAY OF TACROLIMUS: CPT | Mod: ZL | Performed by: INTERNAL MEDICINE

## 2021-04-09 PROCEDURE — 80053 COMPREHEN METABOLIC PANEL: CPT | Mod: ZL | Performed by: INTERNAL MEDICINE

## 2021-04-09 PROCEDURE — 36415 COLL VENOUS BLD VENIPUNCTURE: CPT | Mod: ZL | Performed by: INTERNAL MEDICINE

## 2021-04-10 LAB
TACROLIMUS BLD-MCNC: 6.7 UG/L (ref 5–15)
TME LAST DOSE: NORMAL H

## 2021-04-21 ENCOUNTER — TRANSFERRED RECORDS (OUTPATIENT)
Dept: HEALTH INFORMATION MANAGEMENT | Facility: OTHER | Age: 71
End: 2021-04-21

## 2021-05-14 ENCOUNTER — TELEPHONE (OUTPATIENT)
Dept: FAMILY MEDICINE | Facility: OTHER | Age: 71
End: 2021-05-14

## 2021-05-14 PROBLEM — M81.0 OSTEOPOROSIS: Status: ACTIVE | Noted: 2020-07-09

## 2021-05-14 PROBLEM — C22.0 HEPATOCELLULAR CARCINOMA (H): Status: ACTIVE | Noted: 2018-04-11

## 2021-05-14 PROBLEM — R73.01 IFG (IMPAIRED FASTING GLUCOSE): Status: ACTIVE | Noted: 2020-05-11

## 2021-05-14 PROBLEM — K76.6 PORTAL HYPERTENSIVE GASTROPATHY (H): Status: ACTIVE | Noted: 2018-10-30

## 2021-05-14 PROBLEM — N17.9 ACUTE RENAL FAILURE (H): Status: ACTIVE | Noted: 2019-05-05

## 2021-05-14 PROBLEM — Z87.19 PERSONAL HISTORY OF CIRRHOSIS: Status: ACTIVE | Noted: 2019-05-14

## 2021-05-14 PROBLEM — Z85.05 HISTORY OF HEPATOCELLULAR CARCINOMA: Status: ACTIVE | Noted: 2019-05-14

## 2021-05-14 PROBLEM — K31.89 PORTAL HYPERTENSIVE GASTROPATHY (H): Status: ACTIVE | Noted: 2018-10-30

## 2021-05-14 PROBLEM — K64.9 RECTAL VARICES: Status: ACTIVE | Noted: 2018-10-30

## 2021-05-14 PROBLEM — E87.5 HYPERKALEMIA: Status: ACTIVE | Noted: 2019-06-26

## 2021-05-14 NOTE — TELEPHONE ENCOUNTER
Patient states his last gout attack was years ago. He believes he is having another attack and it is super painful. Writer informed patient Dr. Díaz is out of the clinic. He will need to be seen as lab work will more than likely be needed to check some levels. Patient stated his levels are always high. Writer informed patient since it has been years since his last gout attack he does need to be seen. Patient stated he has an appointment Monday. Writer informed him if the pain is too bad and he cannot wait until Monday he may go to [facility].     Chayo Cochran CMA on 5/14/2021 at 3:25 PM

## 2021-05-14 NOTE — TELEPHONE ENCOUNTER
[facility]-patient is looking for help with Gout     Please call and advise    Thank You    Marta Le on 5/14/2021 at 3:13 PM

## 2021-05-17 ENCOUNTER — OFFICE VISIT (OUTPATIENT)
Dept: FAMILY MEDICINE | Facility: OTHER | Age: 71
End: 2021-05-17
Attending: FAMILY MEDICINE
Payer: MEDICARE

## 2021-05-17 VITALS
RESPIRATION RATE: 18 BRPM | HEART RATE: 87 BPM | BODY MASS INDEX: 30.72 KG/M2 | OXYGEN SATURATION: 98 % | SYSTOLIC BLOOD PRESSURE: 156 MMHG | WEIGHT: 205 LBS | DIASTOLIC BLOOD PRESSURE: 88 MMHG | TEMPERATURE: 98 F

## 2021-05-17 DIAGNOSIS — M10.9 ACUTE GOUT, UNSPECIFIED CAUSE, UNSPECIFIED SITE: Primary | ICD-10-CM

## 2021-05-17 PROBLEM — R73.03 PREDIABETES: Status: ACTIVE | Noted: 2021-04-22

## 2021-05-17 PROCEDURE — G0463 HOSPITAL OUTPT CLINIC VISIT: HCPCS

## 2021-05-17 PROCEDURE — 99213 OFFICE O/P EST LOW 20 MIN: CPT | Performed by: FAMILY MEDICINE

## 2021-05-17 RX ORDER — ATORVASTATIN CALCIUM 20 MG/1
20 TABLET, FILM COATED ORAL DAILY
COMMUNITY
Start: 2021-04-22 | End: 2024-01-03

## 2021-05-17 RX ORDER — PREDNISONE 10 MG/1
TABLET ORAL
Qty: 36 TABLET | Refills: 1 | Status: SHIPPED | OUTPATIENT
Start: 2021-05-17 | End: 2021-09-22

## 2021-05-17 ASSESSMENT — PAIN SCALES - GENERAL: PAINLEVEL: WORST PAIN (10)

## 2021-05-17 NOTE — PROGRESS NOTES
SUBJECTIVE:   Ramo Lockwood is a 71 year old male who presents to clinic today for the following health issues:    Patient presents with a gout flare.  He has had 5 days of redness and swelling in his right big toe.  He has had gout in the past, typically in either his right or left big toe.  Although he states it has been years since his last flare.  He recalls that this is usually treated with a course of indomethacin.  He has never been on allopurinol, doesn't know if he can due to his other health problems and medications.  He did have labs done last month through [facility] and his uric acid was less elevated at that time 9.7.  He also has had a mildly elevated creatinine when checked recently.        OBJECTIVE:     BP (!) 156/88 (BP Location: Right arm, Patient Position: Sitting, Cuff Size: Adult Regular)   Pulse 87   Temp 98  F (36.7  C) (Tympanic)   Resp 18   Wt 93 kg (205 lb)   SpO2 98%   BMI 30.72 kg/m    Body mass index is 30.72 kg/m .  Physical Exam  Constitutional:       Appearance: He is well-developed.   HENT:      Right Ear: External ear normal.      Left Ear: External ear normal.   Eyes:      General: No scleral icterus.     Conjunctiva/sclera: Conjunctivae normal.   Cardiovascular:      Rate and Rhythm: Normal rate.   Pulmonary:      Effort: Pulmonary effort is normal. No respiratory distress.   Musculoskeletal:      Comments: Right big toe erythematous with swelling.   Skin:     Findings: No rash.   Neurological:      Mental Status: He is alert.           ASSESSMENT/PLAN:           ICD-10-CM    1. Acute gout, unspecified cause, unspecified site  M10.9 predniSONE (DELTASONE) 10 MG tablet     Patient presents with clinical evidence of acute gout, recent uric acid level elevated.  Due to mildly increased creatinine, would not recommend indomethacin for this flare.  Patient is started on prednisone 20 mg twice daily for 5 to 7 days until acute flare resolved, then taper over the course of 7  days.  Did discuss the possibility of starting allopurinol, although he is not sure if this is appropriate and would like to talk to his regular provider.  Discussed importance of hydration, dietary recommendations.    Becky Lozano MD  [facility] AND [facility]

## 2021-05-17 NOTE — NURSING NOTE
"Patient here for gout flare up, in right foot.  Elodia Hoffmann LPN ..........5/17/2021 11:25 AM   Chief Complaint   Patient presents with     Arthritis     gout flare up       Initial BP (!) 156/88 (BP Location: Right arm, Patient Position: Sitting, Cuff Size: Adult Regular)   Pulse 87   Temp 98  F (36.7  C) (Tympanic)   Resp 18   Wt 93 kg (205 lb)   SpO2 98%   BMI 30.72 kg/m   Estimated body mass index is 30.72 kg/m  as calculated from the following:    Height as of 9/30/20: 1.74 m (5' 8.5\").    Weight as of this encounter: 93 kg (205 lb).  Medication Reconciliation: complete    Elodia Hoffmann LPN    "

## 2021-05-18 DIAGNOSIS — Z94.4 TRANSPLANTED LIVER (H): ICD-10-CM

## 2021-05-18 DIAGNOSIS — Z01.89 LABORATORY TEST: Primary | ICD-10-CM

## 2021-05-18 DIAGNOSIS — R73.03 PRE-DIABETES: ICD-10-CM

## 2021-05-18 PROCEDURE — 36415 COLL VENOUS BLD VENIPUNCTURE: CPT | Mod: ZL

## 2021-06-30 DIAGNOSIS — R73.03 PRE-DIABETES: ICD-10-CM

## 2021-06-30 DIAGNOSIS — Z94.4 TRANSPLANTED LIVER (H): ICD-10-CM

## 2021-06-30 LAB
ALBUMIN SERPL-MCNC: 4.2 G/DL (ref 3.5–5.7)
ALP SERPL-CCNC: 135 U/L (ref 34–104)
ALT SERPL W P-5'-P-CCNC: 26 U/L (ref 7–52)
ANION GAP SERPL CALCULATED.3IONS-SCNC: 8 MMOL/L (ref 3–14)
AST SERPL W P-5'-P-CCNC: 19 U/L (ref 13–39)
BASOPHILS # BLD AUTO: 0 10E9/L (ref 0–0.2)
BASOPHILS NFR BLD AUTO: 0.3 %
BILIRUB SERPL-MCNC: 1.2 MG/DL (ref 0.3–1)
BUN SERPL-MCNC: 20 MG/DL (ref 7–25)
CALCIUM SERPL-MCNC: 8.7 MG/DL (ref 8.6–10.3)
CHLORIDE SERPL-SCNC: 108 MMOL/L (ref 98–107)
CHOLEST SERPL-MCNC: 136 MG/DL
CO2 SERPL-SCNC: 24 MMOL/L (ref 21–31)
CREAT SERPL-MCNC: 1.58 MG/DL (ref 0.7–1.3)
DIFFERENTIAL METHOD BLD: ABNORMAL
EOSINOPHIL # BLD AUTO: 0.1 10E9/L (ref 0–0.7)
EOSINOPHIL NFR BLD AUTO: 1.5 %
ERYTHROCYTE [DISTWIDTH] IN BLOOD BY AUTOMATED COUNT: 13.7 % (ref 10–15)
GFR SERPL CREATININE-BSD FRML MDRD: 43 ML/MIN/{1.73_M2}
GLUCOSE SERPL-MCNC: 138 MG/DL (ref 70–105)
HBA1C MFR BLD: 6.3 % (ref 4–6)
HCT VFR BLD AUTO: 42.1 % (ref 40–53)
HDLC SERPL-MCNC: 34 MG/DL (ref 23–92)
HGB BLD-MCNC: 14.4 G/DL (ref 13.3–17.7)
IMM GRANULOCYTES # BLD: 0 10E9/L (ref 0–0.4)
IMM GRANULOCYTES NFR BLD: 0.5 %
LDLC SERPL CALC-MCNC: 67 MG/DL
LYMPHOCYTES # BLD AUTO: 0.8 10E9/L (ref 0.8–5.3)
LYMPHOCYTES NFR BLD AUTO: 13.6 %
MCH RBC QN AUTO: 28.9 PG (ref 26.5–33)
MCHC RBC AUTO-ENTMCNC: 34.2 G/DL (ref 31.5–36.5)
MCV RBC AUTO: 85 FL (ref 78–100)
MONOCYTES # BLD AUTO: 0.6 10E9/L (ref 0–1.3)
MONOCYTES NFR BLD AUTO: 9.7 %
NEUTROPHILS # BLD AUTO: 4.4 10E9/L (ref 1.6–8.3)
NEUTROPHILS NFR BLD AUTO: 74.4 %
NONHDLC SERPL-MCNC: 102 MG/DL
PLATELET # BLD AUTO: 128 10E9/L (ref 150–450)
POTASSIUM SERPL-SCNC: 4.9 MMOL/L (ref 3.5–5.1)
PROT SERPL-MCNC: 6.8 G/DL (ref 6.4–8.9)
RBC # BLD AUTO: 4.98 10E12/L (ref 4.4–5.9)
SODIUM SERPL-SCNC: 140 MMOL/L (ref 134–144)
TRIGL SERPL-MCNC: 173 MG/DL
WBC # BLD AUTO: 6 10E9/L (ref 4–11)

## 2021-06-30 PROCEDURE — 80061 LIPID PANEL: CPT | Mod: ZL

## 2021-06-30 PROCEDURE — 85025 COMPLETE CBC W/AUTO DIFF WBC: CPT | Mod: ZL

## 2021-06-30 PROCEDURE — 83036 HEMOGLOBIN GLYCOSYLATED A1C: CPT | Mod: ZL

## 2021-06-30 PROCEDURE — 80053 COMPREHEN METABOLIC PANEL: CPT | Mod: ZL

## 2021-06-30 PROCEDURE — 36415 COLL VENOUS BLD VENIPUNCTURE: CPT | Mod: ZL

## 2021-09-02 ENCOUNTER — TELEPHONE (OUTPATIENT)
Dept: FAMILY MEDICINE | Facility: OTHER | Age: 71
End: 2021-09-02

## 2021-09-02 DIAGNOSIS — G62.9 POLYNEUROPATHY: ICD-10-CM

## 2021-09-02 RX ORDER — GABAPENTIN 300 MG/1
300 CAPSULE ORAL
Qty: 60 CAPSULE | Refills: 11 | Status: SHIPPED | OUTPATIENT
Start: 2021-09-02 | End: 2022-08-24

## 2021-09-02 NOTE — TELEPHONE ENCOUNTER
Is having [facility] send over Gabapentin 300mg refill-- No call back unless problem-[facility]

## 2021-09-07 RX ORDER — GABAPENTIN 300 MG/1
CAPSULE ORAL
Qty: 60 CAPSULE | Refills: 11 | OUTPATIENT
Start: 2021-09-07

## 2021-09-07 NOTE — TELEPHONE ENCOUNTER
Disp Refills Start End [facility]   gabapentin (NEURONTIN) 300 MG capsule 60 capsule 11 9/2/2021  No   Sig - Route: Take 1 capsule (300 mg) by mouth 2 times daily - Oral     Prescription refused.  This is a duplicate request.  Kavya Amin RN.......9/7/2021 3:29 PM

## 2021-09-22 ENCOUNTER — HOSPITAL ENCOUNTER (OUTPATIENT)
Dept: GENERAL RADIOLOGY | Facility: OTHER | Age: 71
End: 2021-09-22
Attending: FAMILY MEDICINE
Payer: MEDICARE

## 2021-09-22 ENCOUNTER — OFFICE VISIT (OUTPATIENT)
Dept: FAMILY MEDICINE | Facility: OTHER | Age: 71
End: 2021-09-22
Attending: FAMILY MEDICINE
Payer: MEDICARE

## 2021-09-22 VITALS
TEMPERATURE: 97.2 F | SYSTOLIC BLOOD PRESSURE: 116 MMHG | HEART RATE: 90 BPM | RESPIRATION RATE: 18 BRPM | OXYGEN SATURATION: 96 % | BODY MASS INDEX: 30.57 KG/M2 | WEIGHT: 204 LBS | DIASTOLIC BLOOD PRESSURE: 80 MMHG

## 2021-09-22 DIAGNOSIS — Z87.19 PERSONAL HISTORY OF CIRRHOSIS: ICD-10-CM

## 2021-09-22 DIAGNOSIS — Z23 NEEDS FLU SHOT: ICD-10-CM

## 2021-09-22 DIAGNOSIS — M25.562 ACUTE PAIN OF LEFT KNEE: Primary | ICD-10-CM

## 2021-09-22 DIAGNOSIS — Z94.4 LIVER REPLACED BY TRANSPLANT (H): ICD-10-CM

## 2021-09-22 DIAGNOSIS — M10.9 ACUTE GOUT, UNSPECIFIED CAUSE, UNSPECIFIED SITE: ICD-10-CM

## 2021-09-22 DIAGNOSIS — Z85.05 HISTORY OF HEPATOCELLULAR CARCINOMA: ICD-10-CM

## 2021-09-22 DIAGNOSIS — M25.562 ACUTE PAIN OF LEFT KNEE: ICD-10-CM

## 2021-09-22 LAB — CRP SERPL-MCNC: 16.4 MG/L

## 2021-09-22 PROCEDURE — G0008 ADMIN INFLUENZA VIRUS VAC: HCPCS

## 2021-09-22 PROCEDURE — 99213 OFFICE O/P EST LOW 20 MIN: CPT | Performed by: FAMILY MEDICINE

## 2021-09-22 PROCEDURE — G0463 HOSPITAL OUTPT CLINIC VISIT: HCPCS

## 2021-09-22 PROCEDURE — 86618 LYME DISEASE ANTIBODY: CPT | Mod: ZL | Performed by: FAMILY MEDICINE

## 2021-09-22 PROCEDURE — 86140 C-REACTIVE PROTEIN: CPT | Mod: ZL | Performed by: FAMILY MEDICINE

## 2021-09-22 PROCEDURE — 36415 COLL VENOUS BLD VENIPUNCTURE: CPT | Mod: ZL | Performed by: FAMILY MEDICINE

## 2021-09-22 PROCEDURE — G0463 HOSPITAL OUTPT CLINIC VISIT: HCPCS | Mod: 25

## 2021-09-22 PROCEDURE — 73560 X-RAY EXAM OF KNEE 1 OR 2: CPT | Mod: RT

## 2021-09-22 PROCEDURE — 90662 IIV NO PRSV INCREASED AG IM: CPT

## 2021-09-22 RX ORDER — PREDNISONE 10 MG/1
TABLET ORAL
Qty: 36 TABLET | Refills: 1 | Status: SHIPPED | OUTPATIENT
Start: 2021-09-22 | End: 2022-01-10

## 2021-09-22 ASSESSMENT — PAIN SCALES - GENERAL: PAINLEVEL: SEVERE PAIN (7)

## 2021-09-22 NOTE — PROGRESS NOTES
"Nursing Notes:   Suri Mcduffie LPN  2021  2:34 PM  Signed  Chief Complaint   Patient presents with     Knee Pain     left knee pain for 8 days   His left knee has been painful and warm to the touch for 8 days.  Suri Mcduffie LPN..................2021   2:20 PM      Initial /80   Pulse 90   Temp 97.2  F (36.2  C) (Temporal)   Resp 18   Wt 92.5 kg (204 lb)   SpO2 96%   BMI 30.57 kg/m   Estimated body mass index is 30.57 kg/m  as calculated from the following:    Height as of 20: 1.74 m (5' 8.5\").    Weight as of this encounter: 92.5 kg (204 lb).  Medication Reconciliation: complete    FOOD SECURITY SCREENING QUESTIONS  Hunger Vital Signs:  Within the past 12 months we worried whether our food would run out before we got money to buy more. Never  Within the past 12 months the food we bought just didn't last and we didn't have money to get more. Never    Suri Mcduffie LPN      SUBJECTIVE:  Ramo Lockwood  is a 71 year old male who comes in today for left knee pain. He has had gout in the feet. He has been taking tylenol. NKI. Left knee symptoms for a week. No fever but knee feels hot.  He has been on Amoxicillin for some dental work recently. He had 5 teeth pulled and will be doing implants.  He sees Neil Benavides of oral surgery in [place].  He had labs done in April and had a fairly high uric acid level but he is not on allopurinol.  He had a gout flare in his right big toe and saw Dr. Lozano and was treated with a steroid.  He has been kneeling a lot in the garden of [facility].  Does not recall any tick bites.    I saw him last fall to establish care.  I last saw him in 2021. He previously got his care in [place].  He had alcoholic cirrhosis and hepatocellular carcinoma and underwent  donor liver transplant in 2019 at [facility].  He had embolization then microwave ablation x2 in the past and had ascites and portal vein thrombosis and varices prior to his " transplant.     He has type 2 diabetes.  His hemoglobin A1c was 5.4% in July.  His last lipids were normal in July.  His thyroid was normal in July as was his comprehensive metabolic panel.  He is followed closely by the transplant team at [facility].  His last virtual visit with him was in July.  He has chronic kidney disease stage III.      He has hypertension.  He is currently on amlodipine 5 mg daily 81 mg of aspirin daily and tacrolimus 1 mg 24-hour extended release capsule daily.  We started him on some gabapentin because of some neuropathy in his feet.    He had labs in June and his hemoglobin A1c was 6.3%.    He has had all of his immunizations but is due for a flu shot.  He has had COVID-19 vaccine and his booster.    Past Medical, Family, and Social History reviewed and updated as noted below.   ROS is negative except as noted above       Allergies   Allergen Reactions     Codeine Nausea     Codeine Camsylate    ,   Family History   Problem Relation Age of Onset     Cardiovascular Father         MI   ,   Current Outpatient Medications   Medication     amLODIPine (NORVASC) 5 MG tablet     aspirin (ASA) 81 MG chewable tablet     atorvastatin (LIPITOR) 20 MG tablet     gabapentin (NEURONTIN) 300 MG capsule     Magnesium Cl-Calcium Carbonate 71.5-119 MG TBEC     predniSONE (DELTASONE) 10 MG tablet     tacrolimus (ASTAGRAF XL) 1 MG 24 hr capsule     No current facility-administered medications for this visit.   ,   Past Medical History:   Diagnosis Date     Acute conjunctivitis, unspecified 11/04/2000     Alcoholic cirrhosis (H)      Chronic pain     back pain     Diabetes mellitus (H)      Gout      Hepatocellular carcinoma (H)      Hyperlipidemia      Hypertension      Special screening for malignant neoplasms, colon 02/16/2009   ,   Patient Active Problem List    Diagnosis Date Noted     Prediabetes 04/22/2021     Priority: Medium     Liver replaced by transplant due to alcoholic cirrhosis and HCC  09/30/2020     Priority: Medium     CKD (chronic kidney disease) stage 3, GFR 30-59 ml/min 09/30/2020     Priority: Medium     Alcoholism (H), in remission 09/30/2020     Priority: Medium     Osteoporosis 07/09/2020     Priority: Medium     IFG (impaired fasting glucose) 05/11/2020     Priority: Medium     Hyperkalemia 06/26/2019     Priority: Medium     Personal history of cirrhosis 05/14/2019     Priority: Medium     History of hepatocellular carcinoma 05/14/2019     Priority: Medium     Acute renal failure (H) 05/05/2019     Priority: Medium     Rectal varices 10/30/2018     Priority: Medium     Portal hypertensive gastropathy (H) 10/30/2018     Priority: Medium     Hepatocellular carcinoma (H) 04/11/2018     Priority: Medium     Influenza A 01/15/2018     Priority: Medium     Esophageal varices in cirrhosis (H) 11/09/2015     Priority: Medium     Hyponatremia 06/05/2015     Priority: Medium     C. 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     Priority: Medium     Diarrhea 04/19/2015     Priority: Medium     Seizure (H) 04/19/2015     Priority: Medium     Nondependent alcohol abuse, continuous drinking behavior 05/18/2014     Priority: Medium     Overview:   IMO Update 10/11       GI bleed 05/18/2014     Priority: Medium     Alcoholic cirrhosis of liver (H) 05/18/2014     Priority: Medium     Ascites 05/18/2014     Priority: Medium     Chronic hyponatremia 05/18/2014     Priority: Medium     Thrombocytopenia (H) 05/18/2014     Priority: Medium     Elevated INR 05/18/2014     Priority: Medium     Anemia 02/17/2014     Priority: Medium     Acute maxillary sinusitis 12/26/2013     Priority: Medium     Epistaxis 12/24/2013     Priority: Medium     Hyperlipidemia 12/19/2011     Priority: Medium     Overview:   IMO Update 10/11       Type II diabetes mellitus (H) 03/23/2011     Priority: Medium     Overview:   IMO Update 10/11       Neoplasm of uncertain behavior of  lip, oral cavity, and pharynx 2006     Priority: Medium     Gout 2006     Priority: Medium     Essential hypertension 2006     Priority: Medium     Degeneration of lumbar or lumbosacral intervertebral disc 2005     Priority: Medium     Overview:   Chronic back syndrome.  PT.  L-spine and T-spine x-rays. L-spine MRI 9/3/02. Lumbar degenerative disc disease. [facility]-.  IMO Update 10/11     ,   Past Surgical History:   Procedure Laterality Date     COLONOSCOPY       HERNIA REPAIR, UMBILICAL  10/02/2015     PHACOEMULSIFICATION WITH STANDARD INTRAOCULAR LENS IMPLANT  6/10/2013    Procedure: PHACOEMULSIFICATION WITH STANDARD INTRAOCULAR LENS IMPLANT;  CATARACT EXTRACTION WITH INTRA OCLULAR LENS LEFT;  Surgeon: Benoit Philip MD;  Location: [facility]     PHACOEMULSIFICATION WITH STANDARD INTRAOCULAR LENS IMPLANT  2013    Procedure: PHACOEMULSIFICATION WITH STANDARD INTRAOCULAR LENS IMPLANT;  CATARACT EXTRACTION WITH INTRA OCULAR LENS RIGHT;  Surgeon: Benoit Philip MD;  Location: [facility] OR     TRANSPLANT LIVER RECIPIENT  DONOR  2019    [facility]     umbilical hernia repair with mesh  2019    [facility]    and   Social History     Tobacco Use     Smoking status: Never Smoker     Smokeless tobacco: Never Used   Substance Use Topics     Alcohol use: No     Comment: H/O abuse and treatment, sober for 2 yrs.      OBJECTIVE:  /80   Pulse 90   Temp 97.2  F (36.2  C) (Temporal)   Resp 18   Wt 92.5 kg (204 lb)   SpO2 96%   BMI 30.57 kg/m     EXAM:  Alert cooperative, no distress.  Left knee is warm slightly reddened and swollen.  He has no specific joint line tenderness but has some tenderness over the superior pole of the patella and some fluid in the suprapatellar pouch.  Aster's is negative and joint is stable.    Results for orders placed or performed in visit on 21   CRP inflammation     Status: Abnormal   Result Value Ref Range    CRP Inflammation 16.4 (H)  <10.0 mg/L      X-ray shows mild degenerative change in the medial compartment but no specific bony abnormality otherwise.  ASSESSMENT/Plan :    Ramo was seen today for knee pain.    Diagnoses and all orders for this visit:    Acute pain of left knee  -     CRP inflammation; Future  -     Lyme Disease Ab with reflex to WB Serum; Future  -     XR Knee Standing 2v  Bilateral & 2v Left; Future  -     Lyme Disease Ab with reflex to WB Serum  -     CRP inflammation  -     predniSONE (DELTASONE) 10 MG tablet; 20mg twice daily x 5 days then 10mg twice daily x 3 days, 10mg once daily x 2 days, 5 mg once daily x 2 d then stop    Liver replaced by transplant due to alcoholic cirrhosis and HCC    History of hepatocellular carcinoma    Personal history of cirrhosis    Needs flu shot  -     FLU SHOT 65+ (FLUZONE HD)    Acute gout, unspecified cause, unspecified site  -     predniSONE (DELTASONE) 10 MG tablet; 20mg twice daily x 5 days then 10mg twice daily x 3 days, 10mg once daily x 2 days, 5 mg once daily x 2 d then stop      Will check CRP and Lyme titer because [place] an endemic area.  I think this is probably an acute flare of his arthritis possibly from overuse or kneeling but certainly could be gout.  Discussed aspiration and crystal exam of the joint but elected to treat empirically with the steroid.  We certainly could consider allopurinol prophylaxis if he continues to have arthritis suspicious for recurrent gout.  Notify of results when available.    Flu shot today    Jim Díaz MD

## 2021-09-22 NOTE — NURSING NOTE
"Chief Complaint   Patient presents with     Knee Pain     left knee pain for 8 days   His left knee has been painful and warm to the touch for 8 days.  Suri Mcduffie LPN..................9/22/2021   2:20 PM      Initial /80   Pulse 90   Temp 97.2  F (36.2  C) (Temporal)   Resp 18   Wt 92.5 kg (204 lb)   SpO2 96%   BMI 30.57 kg/m   Estimated body mass index is 30.57 kg/m  as calculated from the following:    Height as of 9/30/20: 1.74 m (5' 8.5\").    Weight as of this encounter: 92.5 kg (204 lb).  Medication Reconciliation: complete    FOOD SECURITY SCREENING QUESTIONS  Hunger Vital Signs:  Within the past 12 months we worried whether our food would run out before we got money to buy more. Never  Within the past 12 months the food we bought just didn't last and we didn't have money to get more. Never    Suri Mcduffie, TIANA  "

## 2021-09-23 LAB — B BURGDOR IGG+IGM SER QL: 0.02

## 2021-10-05 ENCOUNTER — LAB (OUTPATIENT)
Dept: LAB | Facility: OTHER | Age: 71
End: 2021-10-05
Payer: MEDICARE

## 2021-10-05 DIAGNOSIS — Z01.89 LABORATORY PROCEDURES: Primary | ICD-10-CM

## 2021-10-05 DIAGNOSIS — Z94.4 TRANSPLANTED LIVER (H): ICD-10-CM

## 2021-10-05 LAB
ALBUMIN SERPL-MCNC: 4.2 G/DL (ref 3.5–5.7)
ALP SERPL-CCNC: 117 U/L (ref 34–104)
ALT SERPL W P-5'-P-CCNC: 26 U/L (ref 7–52)
ANION GAP SERPL CALCULATED.3IONS-SCNC: 7 MMOL/L (ref 3–14)
AST SERPL W P-5'-P-CCNC: 15 U/L (ref 13–39)
BASOPHILS # BLD MANUAL: 0 10E3/UL (ref 0–0.2)
BASOPHILS NFR BLD MANUAL: 0 %
BILIRUB SERPL-MCNC: 0.7 MG/DL (ref 0.3–1)
BUN SERPL-MCNC: 22 MG/DL (ref 7–25)
CALCIUM SERPL-MCNC: 9.4 MG/DL (ref 8.6–10.3)
CHLORIDE BLD-SCNC: 102 MMOL/L (ref 98–107)
CO2 SERPL-SCNC: 26 MMOL/L (ref 21–31)
CREAT SERPL-MCNC: 1.43 MG/DL (ref 0.7–1.3)
EOSINOPHIL # BLD MANUAL: 0 10E3/UL (ref 0–0.7)
EOSINOPHIL NFR BLD MANUAL: 0 %
ERYTHROCYTE [DISTWIDTH] IN BLOOD BY AUTOMATED COUNT: 12.9 % (ref 10–15)
GFR SERPL CREATININE-BSD FRML MDRD: 48 ML/MIN/1.73M2
GLUCOSE BLD-MCNC: 128 MG/DL (ref 70–105)
HCT VFR BLD AUTO: 43 % (ref 40–53)
HGB BLD-MCNC: 14.6 G/DL (ref 13.3–17.7)
LYMPHOCYTES # BLD MANUAL: 0.5 10E3/UL (ref 0.8–5.3)
LYMPHOCYTES NFR BLD MANUAL: 5 %
MCH RBC QN AUTO: 29.1 PG (ref 26.5–33)
MCHC RBC AUTO-ENTMCNC: 34 G/DL (ref 31.5–36.5)
MCV RBC AUTO: 86 FL (ref 78–100)
MONOCYTES # BLD MANUAL: 0.5 10E3/UL (ref 0–1.3)
MONOCYTES NFR BLD MANUAL: 5 %
NEUTROPHILS # BLD MANUAL: 9 10E3/UL (ref 1.6–8.3)
NEUTROPHILS NFR BLD MANUAL: 90 %
PLAT MORPH BLD: ABNORMAL
PLATELET # BLD AUTO: 174 10E3/UL (ref 150–450)
POTASSIUM BLD-SCNC: 5 MMOL/L (ref 3.5–5.1)
PROT SERPL-MCNC: 6.6 G/DL (ref 6.4–8.9)
RBC # BLD AUTO: 5.02 10E6/UL (ref 4.4–5.9)
RBC MORPH BLD: ABNORMAL
SODIUM SERPL-SCNC: 135 MMOL/L (ref 134–144)
WBC # BLD AUTO: 10 10E3/UL (ref 4–11)

## 2021-10-05 PROCEDURE — 85014 HEMATOCRIT: CPT | Mod: ZL

## 2021-10-05 PROCEDURE — 80053 COMPREHEN METABOLIC PANEL: CPT | Mod: ZL

## 2021-10-05 PROCEDURE — 36415 COLL VENOUS BLD VENIPUNCTURE: CPT | Mod: ZL

## 2022-01-04 ENCOUNTER — LAB (OUTPATIENT)
Dept: LAB | Facility: OTHER | Age: 72
End: 2022-01-04
Payer: MEDICARE

## 2022-01-04 DIAGNOSIS — M10.071 ACUTE IDIOPATHIC GOUT OF RIGHT FOOT: ICD-10-CM

## 2022-01-04 DIAGNOSIS — Z94.4 TRANSPLANTED LIVER (H): ICD-10-CM

## 2022-01-04 LAB
ALBUMIN SERPL-MCNC: 4.2 G/DL (ref 3.5–5.7)
ALP SERPL-CCNC: 100 U/L (ref 34–104)
ALT SERPL W P-5'-P-CCNC: 24 U/L (ref 7–52)
ANION GAP SERPL CALCULATED.3IONS-SCNC: 8 MMOL/L (ref 3–14)
AST SERPL W P-5'-P-CCNC: 22 U/L (ref 13–39)
BASOPHILS # BLD AUTO: 0 10E3/UL (ref 0–0.2)
BASOPHILS NFR BLD AUTO: 0 %
BILIRUB SERPL-MCNC: 0.9 MG/DL (ref 0.3–1)
BUN SERPL-MCNC: 16 MG/DL (ref 7–25)
CALCIUM SERPL-MCNC: 9.1 MG/DL (ref 8.6–10.3)
CHLORIDE BLD-SCNC: 106 MMOL/L (ref 98–107)
CO2 SERPL-SCNC: 24 MMOL/L (ref 21–31)
CREAT SERPL-MCNC: 1.57 MG/DL (ref 0.7–1.3)
EOSINOPHIL # BLD AUTO: 0.2 10E3/UL (ref 0–0.7)
EOSINOPHIL NFR BLD AUTO: 2 %
ERYTHROCYTE [DISTWIDTH] IN BLOOD BY AUTOMATED COUNT: 13.2 % (ref 10–15)
GFR SERPL CREATININE-BSD FRML MDRD: 46 ML/MIN/1.73M2
GLUCOSE BLD-MCNC: 127 MG/DL (ref 70–105)
HCT VFR BLD AUTO: 40.8 % (ref 40–53)
HGB BLD-MCNC: 14.1 G/DL (ref 13.3–17.7)
IMM GRANULOCYTES # BLD: 0 10E3/UL
IMM GRANULOCYTES NFR BLD: 1 %
LYMPHOCYTES # BLD AUTO: 1 10E3/UL (ref 0.8–5.3)
LYMPHOCYTES NFR BLD AUTO: 12 %
MCH RBC QN AUTO: 29.1 PG (ref 26.5–33)
MCHC RBC AUTO-ENTMCNC: 34.6 G/DL (ref 31.5–36.5)
MCV RBC AUTO: 84 FL (ref 78–100)
MONOCYTES # BLD AUTO: 0.7 10E3/UL (ref 0–1.3)
MONOCYTES NFR BLD AUTO: 8 %
NEUTROPHILS # BLD AUTO: 6.8 10E3/UL (ref 1.6–8.3)
NEUTROPHILS NFR BLD AUTO: 77 %
NRBC # BLD AUTO: 0 10E3/UL
NRBC BLD AUTO-RTO: 0 /100
PLATELET # BLD AUTO: 171 10E3/UL (ref 150–450)
POTASSIUM BLD-SCNC: 4.7 MMOL/L (ref 3.5–5.1)
PROT SERPL-MCNC: 6.5 G/DL (ref 6.4–8.9)
RBC # BLD AUTO: 4.85 10E6/UL (ref 4.4–5.9)
SODIUM SERPL-SCNC: 138 MMOL/L (ref 134–144)
WBC # BLD AUTO: 8.8 10E3/UL (ref 4–11)

## 2022-01-04 PROCEDURE — 36415 COLL VENOUS BLD VENIPUNCTURE: CPT | Mod: ZL

## 2022-01-04 PROCEDURE — 85025 COMPLETE CBC W/AUTO DIFF WBC: CPT | Mod: ZL

## 2022-01-04 PROCEDURE — 80053 COMPREHEN METABOLIC PANEL: CPT | Mod: ZL

## 2022-01-04 PROCEDURE — 82040 ASSAY OF SERUM ALBUMIN: CPT | Mod: ZL

## 2022-01-10 ENCOUNTER — OFFICE VISIT (OUTPATIENT)
Dept: FAMILY MEDICINE | Facility: OTHER | Age: 72
End: 2022-01-10
Attending: FAMILY MEDICINE
Payer: MEDICARE

## 2022-01-10 VITALS
SYSTOLIC BLOOD PRESSURE: 114 MMHG | HEART RATE: 99 BPM | DIASTOLIC BLOOD PRESSURE: 78 MMHG | TEMPERATURE: 98.6 F | RESPIRATION RATE: 20 BRPM | OXYGEN SATURATION: 98 %

## 2022-01-10 DIAGNOSIS — M25.562 ACUTE PAIN OF LEFT KNEE: ICD-10-CM

## 2022-01-10 DIAGNOSIS — M10.9 GOUT, UNSPECIFIED CAUSE, UNSPECIFIED CHRONICITY, UNSPECIFIED SITE: ICD-10-CM

## 2022-01-10 DIAGNOSIS — M10.071 ACUTE IDIOPATHIC GOUT OF RIGHT FOOT: Primary | ICD-10-CM

## 2022-01-10 DIAGNOSIS — M10.9 ACUTE GOUT, UNSPECIFIED CAUSE, UNSPECIFIED SITE: ICD-10-CM

## 2022-01-10 PROCEDURE — G0463 HOSPITAL OUTPT CLINIC VISIT: HCPCS

## 2022-01-10 PROCEDURE — 99213 OFFICE O/P EST LOW 20 MIN: CPT | Performed by: FAMILY MEDICINE

## 2022-01-10 RX ORDER — PREDNISONE 10 MG/1
TABLET ORAL
Qty: 36 TABLET | Refills: 1 | Status: SHIPPED | OUTPATIENT
Start: 2022-01-10 | End: 2022-05-03

## 2022-01-10 RX ORDER — ALLOPURINOL 100 MG/1
100 TABLET ORAL DAILY
Qty: 90 TABLET | Refills: 11 | Status: SHIPPED | OUTPATIENT
Start: 2022-01-10 | End: 2022-09-29

## 2022-01-10 RX ORDER — OXYCODONE AND ACETAMINOPHEN 5; 325 MG/1; MG/1
1 TABLET ORAL EVERY 6 HOURS PRN
Qty: 12 TABLET | Refills: 0 | Status: SHIPPED | OUTPATIENT
Start: 2022-01-10 | End: 2022-01-13

## 2022-01-10 ASSESSMENT — PAIN SCALES - GENERAL: PAINLEVEL: WORST PAIN (10)

## 2022-01-10 NOTE — NURSING NOTE
"Chief Complaint   Patient presents with     Foot Pain     right foot pain for 2 days   His right foot is red , swollen and painful. The pain is worse today. He did not injure it.  Suri Mcduffie LPN..................1/10/2022   9:29 AM      Initial /78   Pulse 99   Temp 98.6  F (37  C) (Temporal)   Resp 20   SpO2 98%  Estimated body mass index is 30.57 kg/m  as calculated from the following:    Height as of 9/30/20: 1.74 m (5' 8.5\").    Weight as of 9/22/21: 92.5 kg (204 lb).  Medication Reconciliation: complete    FOOD SECURITY SCREENING QUESTIONS  Hunger Vital Signs:  Within the past 12 months we worried whether our food would run out before we got money to buy more. Never  Within the past 12 months the food we bought just didn't last and we didn't have money to get more. Never    Suri Mcduffie LPN  "

## 2022-01-10 NOTE — PROGRESS NOTES
"Nursing Notes:   Suri Mcduffie LPN  1/10/2022  9:42 AM  Signed  Chief Complaint   Patient presents with     Foot Pain     right foot pain for 2 days   His right foot is red , swollen and painful. The pain is worse today. He did not injure it.  Suri Mcduffie LPN..................1/10/2022   9:29 AM      Initial /78   Pulse 99   Temp 98.6  F (37  C) (Temporal)   Resp 20   SpO2 98%  Estimated body mass index is 30.57 kg/m  as calculated from the following:    Height as of 20: 1.74 m (5' 8.5\").    Weight as of 21: 92.5 kg (204 lb).  Medication Reconciliation: complete    FOOD SECURITY SCREENING QUESTIONS  Hunger Vital Signs:  Within the past 12 months we worried whether our food would run out before we got money to buy more. Never  Within the past 12 months the food we bought just didn't last and we didn't have money to get more. Never    Suri Mcduffie LPN    SUBJECTIVE:  Ramo Lockwood  is a 71 year old male who comes in with a red swollen right foot.  No known injury.    He is a liver transplant patient.  I last saw him in 2021. He previously got his care in [place].  He had alcoholic cirrhosis and hepatocellular carcinoma and underwent  donor liver transplant in 2019 at [facility].  He had embolization then microwave ablation x2 in the past and had ascites and portal vein thrombosis and varices prior to his transplant.  He has chronic kidney disease stage III.  He has hypertension and is on amlodipine aspirin and tacrolimus as well as some gabapentin.  He is not on a diuretic.    When I saw him in September, he had acute pain of his left knee that we thought could be overuse from kneeling or possibly gout.    Past Medical, Family, and Social History reviewed and updated as noted below.   ROS is negative except as noted above       Allergies   Allergen Reactions     Codeine Nausea     Codeine Camsylate    ,   Family History   Problem Relation Age of Onset     " Cardiovascular Father         MI   ,   Current Outpatient Medications   Medication     allopurinol (ZYLOPRIM) 100 MG tablet     amLODIPine (NORVASC) 5 MG tablet     aspirin (ASA) 81 MG chewable tablet     atorvastatin (LIPITOR) 20 MG tablet     gabapentin (NEURONTIN) 300 MG capsule     Magnesium Cl-Calcium Carbonate 71.5-119 MG TBEC     oxyCODONE-acetaminophen (PERCOCET) 5-325 MG tablet     predniSONE (DELTASONE) 10 MG tablet     tacrolimus (ASTAGRAF XL) 1 MG 24 hr capsule     No current facility-administered medications for this visit.   ,   Past Medical History:   Diagnosis Date     Acute conjunctivitis, unspecified 11/04/2000     Alcoholic cirrhosis (H)      Chronic pain     back pain     Diabetes mellitus (H)      Gout      Hepatocellular carcinoma (H)      Hyperlipidemia      Hypertension      Special screening for malignant neoplasms, colon 02/16/2009   ,   Patient Active Problem List    Diagnosis Date Noted     Prediabetes 04/22/2021     Priority: Medium     Liver replaced by transplant due to alcoholic cirrhosis and HCC 09/30/2020     Priority: Medium     CKD (chronic kidney disease) stage 3, GFR 30-59 ml/min 09/30/2020     Priority: Medium     Alcoholism (H), in remission 09/30/2020     Priority: Medium     Osteoporosis 07/09/2020     Priority: Medium     IFG (impaired fasting glucose) 05/11/2020     Priority: Medium     Hyperkalemia 06/26/2019     Priority: Medium     Personal history of cirrhosis 05/14/2019     Priority: Medium     History of hepatocellular carcinoma 05/14/2019     Priority: Medium     Acute renal failure (H) 05/05/2019     Priority: Medium     Rectal varices 10/30/2018     Priority: Medium     Portal hypertensive gastropathy (H) 10/30/2018     Priority: Medium     Hepatocellular carcinoma (H) 04/11/2018     Priority: Medium     Influenza A 01/15/2018     Priority: Medium     Esophageal varices in cirrhosis (H) 11/09/2015     Priority: Medium     Hyponatremia 06/05/2015     Priority:  Medium     C. 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     Priority: Medium     Diarrhea 04/19/2015     Priority: Medium     Seizure (H) 04/19/2015     Priority: Medium     Nondependent alcohol abuse, continuous drinking behavior 05/18/2014     Priority: Medium     Overview:   IMO Update 10/11       GI bleed 05/18/2014     Priority: Medium     Alcoholic cirrhosis of liver (H) 05/18/2014     Priority: Medium     Ascites 05/18/2014     Priority: Medium     Chronic hyponatremia 05/18/2014     Priority: Medium     Thrombocytopenia (H) 05/18/2014     Priority: Medium     Elevated INR 05/18/2014     Priority: Medium     Anemia 02/17/2014     Priority: Medium     Acute maxillary sinusitis 12/26/2013     Priority: Medium     Epistaxis 12/24/2013     Priority: Medium     Hyperlipidemia 12/19/2011     Priority: Medium     Overview:   IMO Update 10/11       Type II diabetes mellitus (H) 03/23/2011     Priority: Medium     Overview:   IMO Update 10/11       Neoplasm of uncertain behavior of lip, oral cavity, 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  PT.  L-spine and T-spine x-rays. L-spine MRI 9/3/02. Lumbar degenerative disc disease. [facility]-.  IMO Update 10/11     ,   Past Surgical History:   Procedure Laterality Date     COLONOSCOPY       HERNIA REPAIR, UMBILICAL  10/02/2015     PHACOEMULSIFICATION WITH STANDARD INTRAOCULAR LENS IMPLANT  6/10/2013    Procedure: PHACOEMULSIFICATION WITH STANDARD INTRAOCULAR LENS IMPLANT;  CATARACT EXTRACTION WITH INTRA OCLULAR LENS LEFT;  Surgeon: Benoit Philip MD;  Location: [facility]     PHACOEMULSIFICATION WITH STANDARD INTRAOCULAR LENS IMPLANT  7/23/2013    Procedure: PHACOEMULSIFICATION WITH STANDARD  INTRAOCULAR LENS IMPLANT;  CATARACT EXTRACTION WITH INTRA OCULAR LENS RIGHT;  Surgeon: Benoit Philip MD;  Location: [facility] OR     TRANSPLANT LIVER RECIPIENT  DONOR  2019    [facility]     umbilical hernia repair with mesh  2019    [facility]    and   Social History     Tobacco Use     Smoking status: Never Smoker     Smokeless tobacco: Never Used   Substance Use Topics     Alcohol use: No     Comment: H/O abuse and treatment, sober for 2 yrs.      OBJECTIVE:  /78   Pulse 99   Temp 98.6  F (37  C) (Temporal)   Resp 20   SpO2 98%    EXAM:  Alert cooperative, no distress.  Right ankle is red and swollen and warm consistent with acute arthritis.  ASSESSMENT/Plan :    Ramo was seen today for foot pain.    Diagnoses and all orders for this visit:    Acute idiopathic gout of right foot  -     Uric acid; Future  -     allopurinol (ZYLOPRIM) 100 MG tablet; Take 1 tablet (100 mg) by mouth daily  -     oxyCODONE-acetaminophen (PERCOCET) 5-325 MG tablet; Take 1 tablet by mouth every 6 hours as needed for pain    Acute pain of left knee    Acute gout, unspecified cause, unspecified site  -     predniSONE (DELTASONE) 10 MG tablet; 20mg twice daily x 5 days then 10mg twice daily x 3 days, 10mg once daily x 2 days, 5 mg once daily x 2 d then stop    Gout, unspecified cause, unspecified chronicity, unspecified site  -     allopurinol (ZYLOPRIM) 100 MG tablet; Take 1 tablet (100 mg) by mouth daily      I think he has acute gout.  We will treat with prednisone 20 mg twice daily for 5 days then 10 mg twice daily for 3 days and then 10 mg once daily for 2 days and then 5 mg daily for 2 days then stop.  Will place on allopurinol 100 mg daily but will start that only after his acute arthritis has settled down.  Kidney function has been stable.  Follow if worsening or not improving    Jim Díaz MD            Answers for HPI/ROS submitted by the patient on 1/10/2022  How many servings of fruits and  vegetables do you eat daily?: 0-1  On average, how many sweetened beverages do you drink each day (Examples: soda, juice, sweet tea, etc.  Do NOT count diet or artificially sweetened beverages)?: 1  How many minutes a day do you exercise enough to make your heart beat faster?: 20 to 29  How many days a week do you exercise enough to make your heart beat faster?: 3 or less  How many days per week do you miss taking your medication?: 0

## 2022-01-24 DIAGNOSIS — Z94.4 LIVER REPLACED BY TRANSPLANT (H): Primary | ICD-10-CM

## 2022-01-24 DIAGNOSIS — Z79.899 NEED FOR PROPHYLACTIC CHEMOTHERAPY: ICD-10-CM

## 2022-01-28 ENCOUNTER — LAB (OUTPATIENT)
Dept: LAB | Facility: OTHER | Age: 72
End: 2022-01-28
Attending: INTERNAL MEDICINE
Payer: MEDICARE

## 2022-01-28 DIAGNOSIS — Z94.4 TRANSPLANTED LIVER (H): ICD-10-CM

## 2022-01-28 DIAGNOSIS — Z94.4 LIVER REPLACED BY TRANSPLANT (H): ICD-10-CM

## 2022-01-28 LAB
ALBUMIN SERPL-MCNC: 4.2 G/DL (ref 3.5–5.7)
ALP SERPL-CCNC: 80 U/L (ref 34–104)
ALT SERPL W P-5'-P-CCNC: 33 U/L (ref 7–52)
ANION GAP SERPL CALCULATED.3IONS-SCNC: 7 MMOL/L (ref 3–14)
AST SERPL W P-5'-P-CCNC: 16 U/L (ref 13–39)
BASOPHILS # BLD AUTO: 0 10E3/UL (ref 0–0.2)
BASOPHILS NFR BLD AUTO: 0 %
BILIRUB SERPL-MCNC: 0.7 MG/DL (ref 0.3–1)
BUN SERPL-MCNC: 18 MG/DL (ref 7–25)
CALCIUM SERPL-MCNC: 9.6 MG/DL (ref 8.6–10.3)
CHLORIDE BLD-SCNC: 105 MMOL/L (ref 98–107)
CO2 SERPL-SCNC: 27 MMOL/L (ref 21–31)
CREAT SERPL-MCNC: 1.61 MG/DL (ref 0.7–1.3)
EOSINOPHIL # BLD AUTO: 0.1 10E3/UL (ref 0–0.7)
EOSINOPHIL NFR BLD AUTO: 2 %
ERYTHROCYTE [DISTWIDTH] IN BLOOD BY AUTOMATED COUNT: 12.9 % (ref 10–15)
GFR SERPL CREATININE-BSD FRML MDRD: 45 ML/MIN/1.73M2
GLUCOSE BLD-MCNC: 104 MG/DL (ref 70–105)
HCT VFR BLD AUTO: 40.7 % (ref 40–53)
HGB BLD-MCNC: 13.3 G/DL (ref 13.3–17.7)
IMM GRANULOCYTES # BLD: 0 10E3/UL
IMM GRANULOCYTES NFR BLD: 0 %
LYMPHOCYTES # BLD AUTO: 1 10E3/UL (ref 0.8–5.3)
LYMPHOCYTES NFR BLD AUTO: 14 %
MCH RBC QN AUTO: 29 PG (ref 26.5–33)
MCHC RBC AUTO-ENTMCNC: 32.7 G/DL (ref 31.5–36.5)
MCV RBC AUTO: 89 FL (ref 78–100)
MONOCYTES # BLD AUTO: 0.7 10E3/UL (ref 0–1.3)
MONOCYTES NFR BLD AUTO: 9 %
NEUTROPHILS # BLD AUTO: 5.4 10E3/UL (ref 1.6–8.3)
NEUTROPHILS NFR BLD AUTO: 75 %
NRBC # BLD AUTO: 0 10E3/UL
NRBC BLD AUTO-RTO: 0 /100
PLATELET # BLD AUTO: 126 10E3/UL (ref 150–450)
POTASSIUM BLD-SCNC: 4.8 MMOL/L (ref 3.5–5.1)
PROT SERPL-MCNC: 5.8 G/DL (ref 6.4–8.9)
RBC # BLD AUTO: 4.58 10E6/UL (ref 4.4–5.9)
SODIUM SERPL-SCNC: 139 MMOL/L (ref 134–144)
WBC # BLD AUTO: 7.2 10E3/UL (ref 4–11)

## 2022-01-28 PROCEDURE — 36415 COLL VENOUS BLD VENIPUNCTURE: CPT | Mod: ZL

## 2022-01-28 PROCEDURE — 80053 COMPREHEN METABOLIC PANEL: CPT | Mod: ZL

## 2022-01-28 PROCEDURE — 85025 COMPLETE CBC W/AUTO DIFF WBC: CPT | Mod: ZL

## 2022-03-19 ENCOUNTER — HEALTH MAINTENANCE LETTER (OUTPATIENT)
Age: 72
End: 2022-03-19

## 2022-04-02 ENCOUNTER — ALLIED HEALTH/NURSE VISIT (OUTPATIENT)
Dept: FAMILY MEDICINE | Facility: OTHER | Age: 72
End: 2022-04-02
Attending: FAMILY MEDICINE
Payer: MEDICARE

## 2022-04-02 DIAGNOSIS — Z20.822 COVID-19 RULED OUT: Primary | ICD-10-CM

## 2022-04-02 PROCEDURE — U0003 INFECTIOUS AGENT DETECTION BY NUCLEIC ACID (DNA OR RNA); SEVERE ACUTE RESPIRATORY SYNDROME CORONAVIRUS 2 (SARS-COV-2) (CORONAVIRUS DISEASE [COVID-19]), AMPLIFIED PROBE TECHNIQUE, MAKING USE OF HIGH THROUGHPUT TECHNOLOGIES AS DESCRIBED BY CMS-2020-01-R: HCPCS | Mod: ZL

## 2022-04-02 PROCEDURE — C9803 HOPD COVID-19 SPEC COLLECT: HCPCS

## 2022-04-02 NOTE — PROGRESS NOTES
Patient here today for Covid test.  Travel     Yaa Coles [place] .............................on 4/2/2022 at 11:29 AM

## 2022-04-03 LAB — SARS-COV-2 RNA RESP QL NAA+PROBE: NEGATIVE

## 2022-04-25 ENCOUNTER — LAB (OUTPATIENT)
Dept: LAB | Facility: OTHER | Age: 72
End: 2022-04-25
Payer: MEDICARE

## 2022-04-25 DIAGNOSIS — Z94.4 TRANSPLANTED LIVER (H): ICD-10-CM

## 2022-04-25 LAB
ALBUMIN SERPL-MCNC: 4 G/DL (ref 3.5–5.7)
ALP SERPL-CCNC: 88 U/L (ref 34–104)
ALT SERPL W P-5'-P-CCNC: 26 U/L (ref 7–52)
ANION GAP SERPL CALCULATED.3IONS-SCNC: 6 MMOL/L (ref 3–14)
AST SERPL W P-5'-P-CCNC: 20 U/L (ref 13–39)
BASOPHILS # BLD AUTO: 0 10E3/UL (ref 0–0.2)
BASOPHILS NFR BLD AUTO: 0 %
BILIRUB SERPL-MCNC: 0.7 MG/DL (ref 0.3–1)
BUN SERPL-MCNC: 14 MG/DL (ref 7–25)
CALCIUM SERPL-MCNC: 9 MG/DL (ref 8.6–10.3)
CHLORIDE BLD-SCNC: 106 MMOL/L (ref 98–107)
CO2 SERPL-SCNC: 25 MMOL/L (ref 21–31)
CREAT SERPL-MCNC: 1.38 MG/DL (ref 0.7–1.3)
EOSINOPHIL # BLD AUTO: 0.1 10E3/UL (ref 0–0.7)
EOSINOPHIL NFR BLD AUTO: 2 %
ERYTHROCYTE [DISTWIDTH] IN BLOOD BY AUTOMATED COUNT: 13.5 % (ref 10–15)
GFR SERPL CREATININE-BSD FRML MDRD: 54 ML/MIN/1.73M2
GLUCOSE BLD-MCNC: 119 MG/DL (ref 70–105)
HCT VFR BLD AUTO: 42.7 % (ref 40–53)
HGB BLD-MCNC: 14.4 G/DL (ref 13.3–17.7)
IMM GRANULOCYTES # BLD: 0 10E3/UL
IMM GRANULOCYTES NFR BLD: 1 %
LYMPHOCYTES # BLD AUTO: 1 10E3/UL (ref 0.8–5.3)
LYMPHOCYTES NFR BLD AUTO: 15 %
MCH RBC QN AUTO: 29.3 PG (ref 26.5–33)
MCHC RBC AUTO-ENTMCNC: 33.7 G/DL (ref 31.5–36.5)
MCV RBC AUTO: 87 FL (ref 78–100)
MONOCYTES # BLD AUTO: 0.6 10E3/UL (ref 0–1.3)
MONOCYTES NFR BLD AUTO: 10 %
NEUTROPHILS # BLD AUTO: 4.8 10E3/UL (ref 1.6–8.3)
NEUTROPHILS NFR BLD AUTO: 72 %
NRBC # BLD AUTO: 0 10E3/UL
NRBC BLD AUTO-RTO: 0 /100
PLATELET # BLD AUTO: 158 10E3/UL (ref 150–450)
POTASSIUM BLD-SCNC: 4.5 MMOL/L (ref 3.5–5.1)
PROT SERPL-MCNC: 6.2 G/DL (ref 6.4–8.9)
RBC # BLD AUTO: 4.91 10E6/UL (ref 4.4–5.9)
SODIUM SERPL-SCNC: 137 MMOL/L (ref 134–144)
WBC # BLD AUTO: 6.6 10E3/UL (ref 4–11)

## 2022-04-25 PROCEDURE — 80053 COMPREHEN METABOLIC PANEL: CPT | Mod: ZL

## 2022-04-25 PROCEDURE — 85025 COMPLETE CBC W/AUTO DIFF WBC: CPT | Mod: ZL

## 2022-04-25 PROCEDURE — 36415 COLL VENOUS BLD VENIPUNCTURE: CPT | Mod: ZL

## 2022-05-03 ENCOUNTER — OFFICE VISIT (OUTPATIENT)
Dept: FAMILY MEDICINE | Facility: OTHER | Age: 72
End: 2022-05-03
Attending: STUDENT IN AN ORGANIZED HEALTH CARE EDUCATION/TRAINING PROGRAM
Payer: MEDICARE

## 2022-05-03 VITALS
HEART RATE: 96 BPM | WEIGHT: 209.8 LBS | OXYGEN SATURATION: 97 % | SYSTOLIC BLOOD PRESSURE: 106 MMHG | TEMPERATURE: 97.1 F | RESPIRATION RATE: 16 BRPM | BODY MASS INDEX: 31.44 KG/M2 | DIASTOLIC BLOOD PRESSURE: 92 MMHG

## 2022-05-03 DIAGNOSIS — M10.062 ACUTE IDIOPATHIC GOUT OF LEFT KNEE: ICD-10-CM

## 2022-05-03 PROCEDURE — G0463 HOSPITAL OUTPT CLINIC VISIT: HCPCS

## 2022-05-03 PROCEDURE — 99213 OFFICE O/P EST LOW 20 MIN: CPT | Performed by: STUDENT IN AN ORGANIZED HEALTH CARE EDUCATION/TRAINING PROGRAM

## 2022-05-03 RX ORDER — CHLORHEXIDINE GLUCONATE ORAL RINSE 1.2 MG/ML
SOLUTION DENTAL
COMMUNITY
Start: 2022-04-29 | End: 2022-12-22

## 2022-05-03 RX ORDER — PREDNISONE 10 MG/1
TABLET ORAL
Qty: 36 TABLET | Refills: 1 | Status: SHIPPED | OUTPATIENT
Start: 2022-05-03 | End: 2022-12-22

## 2022-05-03 RX ORDER — AMOXICILLIN 500 MG/1
TABLET, FILM COATED ORAL
COMMUNITY
Start: 2022-04-29 | End: 2022-09-29

## 2022-05-03 RX ORDER — AMLODIPINE BESYLATE 10 MG/1
10 TABLET ORAL
COMMUNITY
Start: 2022-03-22 | End: 2022-06-20

## 2022-05-03 ASSESSMENT — PAIN SCALES - GENERAL: PAINLEVEL: WORST PAIN (10)

## 2022-05-03 NOTE — PROGRESS NOTES
Assessment & Plan     Acute idiopathic gout of left knee  Erythematous swollen knee, tender, likely gout flare given his previous gout flares. Did have recent travel but non tender calf and leg so not concerned about DVT. No obvious wound or concern for cellulitis. Certainly could be arthritis underlying as well. Will treat with prednisone course as this helped in the past. Follow up with PCP. Continue OTC analgesics  - predniSONE (DELTASONE) 10 MG tablet; 20mg twice daily x 5 days then 10mg twice daily x 3 days, 10mg once daily x 2 days, 5 mg once daily x 2 d then stop    Monica Mancuso MD  [facility]    Hossein Ralph is a 72 year old who presents for the following health issues     HPI     Left knee pain   - started 5 days ago, gradually getting worse  - describes pain as sharp and hot  - has been trying tylenol but not helping so came [facility]  - feels like previous gout flares in his ankles   - eating pork flares his gout  - he had a trip to [place] recently and was eating pork there so likely trigger  - takes allopurinol daily   - no chills or fevers  - no calf or leg pain   - right knee feels fine  - usually gets gout in his ankles or feet  - no known trauma   - has had prednisone for gout flares in the past, tolerates it well           Review of Systems   Constitutional, HEENT, cardiovascular, pulmonary, gi and gu systems are negative, except as otherwise noted.      Objective    BP (!) 106/92 (BP Location: Right arm, Patient Position: Sitting, Cuff Size: Adult Regular)   Pulse 96   Temp 97.1  F (36.2  C) (Tympanic)   Resp 16   Wt 95.2 kg (209 lb 12.8 oz)   SpO2 97%   BMI 31.44 kg/m    Body mass index is 31.44 kg/m .  Physical Exam   GENERAL: healthy, alert and no distress  Left lower extremity: left knee swollen and red compared to right knee. Knee diffusely tender to palpation. No popliteal tenderness to palpation ,no calf tenderness or swelling. ROM  limited by pain  SKIN: no bruising or rash  PSYCH: mentation appears normal, affect normal/bright

## 2022-05-03 NOTE — NURSING NOTE
"Chief Complaint   Patient presents with     Knee Pain     left     Patient presents to  for left knee pain. Patient stated it started 6 days ago. No known injury. Is hot to the touch.    Initial BP (!) 106/92 (BP Location: Right arm, Patient Position: Sitting, Cuff Size: Adult Regular)   Pulse 96   Temp 97.1  F (36.2  C) (Tympanic)   Resp 16   Wt 95.2 kg (209 lb 12.8 oz)   SpO2 97%   BMI 31.44 kg/m   Estimated body mass index is 31.44 kg/m  as calculated from the following:    Height as of 9/30/20: 1.74 m (5' 8.5\").    Weight as of this encounter: 95.2 kg (209 lb 12.8 oz).  Medication Reconciliation: Completed     Advanced Care Directive Reviewed    Obdulio Brink LPN  "

## 2022-07-07 ENCOUNTER — LAB (OUTPATIENT)
Dept: LAB | Facility: OTHER | Age: 72
End: 2022-07-07
Payer: MEDICARE

## 2022-07-07 DIAGNOSIS — Z94.4 TRANSPLANTED LIVER (H): ICD-10-CM

## 2022-07-07 LAB
ALBUMIN SERPL-MCNC: 4.1 G/DL (ref 3.5–5.7)
ALP SERPL-CCNC: 75 U/L (ref 34–104)
ALT SERPL W P-5'-P-CCNC: 29 U/L (ref 7–52)
ANION GAP SERPL CALCULATED.3IONS-SCNC: 7 MMOL/L (ref 3–14)
AST SERPL W P-5'-P-CCNC: 26 U/L (ref 13–39)
BASOPHILS # BLD AUTO: 0 10E3/UL (ref 0–0.2)
BASOPHILS NFR BLD AUTO: 0 %
BILIRUB SERPL-MCNC: 0.8 MG/DL (ref 0.3–1)
BUN SERPL-MCNC: 19 MG/DL (ref 7–25)
CALCIUM SERPL-MCNC: 9.4 MG/DL (ref 8.6–10.3)
CHLORIDE BLD-SCNC: 108 MMOL/L (ref 98–107)
CO2 SERPL-SCNC: 24 MMOL/L (ref 21–31)
CREAT SERPL-MCNC: 1.48 MG/DL (ref 0.7–1.3)
EOSINOPHIL # BLD AUTO: 0.1 10E3/UL (ref 0–0.7)
EOSINOPHIL NFR BLD AUTO: 1 %
ERYTHROCYTE [DISTWIDTH] IN BLOOD BY AUTOMATED COUNT: 14.2 % (ref 10–15)
GFR SERPL CREATININE-BSD FRML MDRD: 49 ML/MIN/1.73M2
GLUCOSE BLD-MCNC: 110 MG/DL (ref 70–105)
HCT VFR BLD AUTO: 41.7 % (ref 40–53)
HGB BLD-MCNC: 14.2 G/DL (ref 13.3–17.7)
IMM GRANULOCYTES # BLD: 0 10E3/UL
IMM GRANULOCYTES NFR BLD: 1 %
LYMPHOCYTES # BLD AUTO: 1.2 10E3/UL (ref 0.8–5.3)
LYMPHOCYTES NFR BLD AUTO: 16 %
MCH RBC QN AUTO: 29.9 PG (ref 26.5–33)
MCHC RBC AUTO-ENTMCNC: 34.1 G/DL (ref 31.5–36.5)
MCV RBC AUTO: 88 FL (ref 78–100)
MONOCYTES # BLD AUTO: 0.6 10E3/UL (ref 0–1.3)
MONOCYTES NFR BLD AUTO: 9 %
NEUTROPHILS # BLD AUTO: 5.5 10E3/UL (ref 1.6–8.3)
NEUTROPHILS NFR BLD AUTO: 73 %
NRBC # BLD AUTO: 0 10E3/UL
NRBC BLD AUTO-RTO: 0 /100
PLATELET # BLD AUTO: 155 10E3/UL (ref 150–450)
POTASSIUM BLD-SCNC: 4.3 MMOL/L (ref 3.5–5.1)
PROT SERPL-MCNC: 6.5 G/DL (ref 6.4–8.9)
RBC # BLD AUTO: 4.75 10E6/UL (ref 4.4–5.9)
SODIUM SERPL-SCNC: 139 MMOL/L (ref 134–144)
WBC # BLD AUTO: 7.4 10E3/UL (ref 4–11)

## 2022-07-07 PROCEDURE — 80053 COMPREHEN METABOLIC PANEL: CPT | Mod: ZL

## 2022-07-07 PROCEDURE — 36415 COLL VENOUS BLD VENIPUNCTURE: CPT | Mod: ZL

## 2022-07-07 PROCEDURE — 85025 COMPLETE CBC W/AUTO DIFF WBC: CPT | Mod: ZL

## 2022-08-23 DIAGNOSIS — G62.9 POLYNEUROPATHY: ICD-10-CM

## 2022-08-24 RX ORDER — GABAPENTIN 300 MG/1
CAPSULE ORAL
Qty: 60 CAPSULE | Refills: 0 | Status: SHIPPED | OUTPATIENT
Start: 2022-08-24 | End: 2022-09-29

## 2022-08-24 NOTE — TELEPHONE ENCOUNTER
[facility] sent Rx request for the following:      Requested Prescriptions   Pending Prescriptions Disp Refills     gabapentin (NEURONTIN) 300 MG capsule [Pharmacy Med Name: GABAPENTIN 300MG CAPSULES] 60 capsule 11     Sig: TAKE 1 CAPSULE(300 MG) BY MOUTH TWICE DAILY   Last Prescription Date:   9/2/21  Last Fill Qty/Refills:         60, R-11    Last Office Visit:              1/10/22   Future Office visit:           None    In clinical absence of patient's primary, Jim Díza, patient is requesting that this message be sent to the covering provider for consideration please.    Santa Crowley RN .............. 8/24/2022  9:29 AM

## 2022-08-24 NOTE — TELEPHONE ENCOUNTER
Follow up with Jim Díaz MD is due  PDMP Review       Value Time User    State PDMP site checked  Yes 8/24/2022  9:53 AM Debi Palmer MD Patricia Carlin-Janssen, MD

## 2022-09-04 ENCOUNTER — HEALTH MAINTENANCE LETTER (OUTPATIENT)
Age: 72
End: 2022-09-04

## 2022-09-29 ENCOUNTER — OFFICE VISIT (OUTPATIENT)
Dept: FAMILY MEDICINE | Facility: OTHER | Age: 72
End: 2022-09-29
Attending: FAMILY MEDICINE
Payer: MEDICARE

## 2022-09-29 VITALS
OXYGEN SATURATION: 98 % | TEMPERATURE: 96.9 F | HEART RATE: 85 BPM | RESPIRATION RATE: 18 BRPM | WEIGHT: 204 LBS | DIASTOLIC BLOOD PRESSURE: 84 MMHG | HEIGHT: 69 IN | BODY MASS INDEX: 30.21 KG/M2 | SYSTOLIC BLOOD PRESSURE: 126 MMHG

## 2022-09-29 DIAGNOSIS — L57.0 ACTINIC KERATOSIS: ICD-10-CM

## 2022-09-29 DIAGNOSIS — N18.31 STAGE 3A CHRONIC KIDNEY DISEASE (H): ICD-10-CM

## 2022-09-29 DIAGNOSIS — R73.03 PREDIABETES: ICD-10-CM

## 2022-09-29 DIAGNOSIS — Z85.05 HISTORY OF HEPATOCELLULAR CARCINOMA: ICD-10-CM

## 2022-09-29 DIAGNOSIS — F10.20 ALCOHOLISM (H): ICD-10-CM

## 2022-09-29 DIAGNOSIS — G56.03 CARPAL TUNNEL SYNDROME, BILATERAL: ICD-10-CM

## 2022-09-29 DIAGNOSIS — M10.9 GOUT, UNSPECIFIED CAUSE, UNSPECIFIED CHRONICITY, UNSPECIFIED SITE: ICD-10-CM

## 2022-09-29 DIAGNOSIS — N42.9 PROSTATE DISORDER: ICD-10-CM

## 2022-09-29 DIAGNOSIS — Z00.00 MEDICARE ANNUAL WELLNESS VISIT, SUBSEQUENT: Primary | ICD-10-CM

## 2022-09-29 DIAGNOSIS — D69.6 THROMBOCYTOPENIA (H): ICD-10-CM

## 2022-09-29 DIAGNOSIS — G62.9 POLYNEUROPATHY: ICD-10-CM

## 2022-09-29 DIAGNOSIS — Z23 HIGH PRIORITY FOR 2019-NCOV VACCINE: ICD-10-CM

## 2022-09-29 DIAGNOSIS — Z94.4 LIVER REPLACED BY TRANSPLANT (H): ICD-10-CM

## 2022-09-29 DIAGNOSIS — I10 ESSENTIAL HYPERTENSION: ICD-10-CM

## 2022-09-29 DIAGNOSIS — M10.071 ACUTE IDIOPATHIC GOUT OF RIGHT FOOT: ICD-10-CM

## 2022-09-29 LAB
ALBUMIN SERPL-MCNC: 4.2 G/DL (ref 3.5–5.7)
ALP SERPL-CCNC: 73 U/L (ref 34–104)
ALT SERPL W P-5'-P-CCNC: 21 U/L (ref 7–52)
ANION GAP SERPL CALCULATED.3IONS-SCNC: 7 MMOL/L (ref 3–14)
AST SERPL W P-5'-P-CCNC: 18 U/L (ref 13–39)
BASOPHILS # BLD AUTO: 0 10E3/UL (ref 0–0.2)
BASOPHILS NFR BLD AUTO: 0 %
BILIRUB SERPL-MCNC: 0.6 MG/DL (ref 0.3–1)
BUN SERPL-MCNC: 17 MG/DL (ref 7–25)
CALCIUM SERPL-MCNC: 9.4 MG/DL (ref 8.6–10.3)
CHLORIDE BLD-SCNC: 107 MMOL/L (ref 98–107)
CHOLEST SERPL-MCNC: 151 MG/DL
CO2 SERPL-SCNC: 24 MMOL/L (ref 21–31)
CREAT SERPL-MCNC: 1.31 MG/DL (ref 0.7–1.3)
CREAT UR-MCNC: 50.2 MG/DL
EOSINOPHIL # BLD AUTO: 0 10E3/UL (ref 0–0.7)
EOSINOPHIL NFR BLD AUTO: 0 %
ERYTHROCYTE [DISTWIDTH] IN BLOOD BY AUTOMATED COUNT: 12.9 % (ref 10–15)
FASTING STATUS PATIENT QL REPORTED: YES
GFR SERPL CREATININE-BSD FRML MDRD: 57 ML/MIN/1.73M2
GLUCOSE BLD-MCNC: 129 MG/DL (ref 70–105)
HBA1C MFR BLD: 5.8 % (ref 4–6.2)
HCT VFR BLD AUTO: 41.3 % (ref 40–53)
HDLC SERPL-MCNC: 59 MG/DL (ref 23–92)
HGB BLD-MCNC: 14.2 G/DL (ref 13.3–17.7)
IMM GRANULOCYTES # BLD: 0 10E3/UL
IMM GRANULOCYTES NFR BLD: 1 %
LDLC SERPL CALC-MCNC: 66 MG/DL
LYMPHOCYTES # BLD AUTO: 0.7 10E3/UL (ref 0.8–5.3)
LYMPHOCYTES NFR BLD AUTO: 9 %
MCH RBC QN AUTO: 30 PG (ref 26.5–33)
MCHC RBC AUTO-ENTMCNC: 34.4 G/DL (ref 31.5–36.5)
MCV RBC AUTO: 87 FL (ref 78–100)
MICROALBUMIN UR-MCNC: 19.1 MG/L
MICROALBUMIN/CREAT UR: 38.05 MG/G CR (ref 0–17)
MONOCYTES # BLD AUTO: 0.5 10E3/UL (ref 0–1.3)
MONOCYTES NFR BLD AUTO: 6 %
NEUTROPHILS # BLD AUTO: 6.8 10E3/UL (ref 1.6–8.3)
NEUTROPHILS NFR BLD AUTO: 84 %
NONHDLC SERPL-MCNC: 92 MG/DL
NRBC # BLD AUTO: 0 10E3/UL
NRBC BLD AUTO-RTO: 0 /100
PLATELET # BLD AUTO: 146 10E3/UL (ref 150–450)
POTASSIUM BLD-SCNC: 4.9 MMOL/L (ref 3.5–5.1)
PROT SERPL-MCNC: 6.8 G/DL (ref 6.4–8.9)
PSA SERPL-MCNC: 0.25 UG/L (ref 0–4)
RBC # BLD AUTO: 4.74 10E6/UL (ref 4.4–5.9)
SODIUM SERPL-SCNC: 138 MMOL/L (ref 134–144)
TRIGL SERPL-MCNC: 131 MG/DL
TSH SERPL DL<=0.005 MIU/L-ACNC: 1.6 MU/L (ref 0.4–4)
URATE SERPL-MCNC: 5.8 MG/DL (ref 4.4–7.6)
WBC # BLD AUTO: 8.2 10E3/UL (ref 4–11)

## 2022-09-29 PROCEDURE — 91312 COVID-19,PF,PFIZER BOOSTER BIVALENT: CPT

## 2022-09-29 PROCEDURE — 84153 ASSAY OF PSA TOTAL: CPT | Mod: ZL | Performed by: FAMILY MEDICINE

## 2022-09-29 PROCEDURE — 82043 UR ALBUMIN QUANTITATIVE: CPT | Mod: ZL | Performed by: FAMILY MEDICINE

## 2022-09-29 PROCEDURE — 84443 ASSAY THYROID STIM HORMONE: CPT | Mod: ZL | Performed by: FAMILY MEDICINE

## 2022-09-29 PROCEDURE — G0463 HOSPITAL OUTPT CLINIC VISIT: HCPCS | Mod: 25

## 2022-09-29 PROCEDURE — G0439 PPPS, SUBSEQ VISIT: HCPCS | Performed by: FAMILY MEDICINE

## 2022-09-29 PROCEDURE — 99215 OFFICE O/P EST HI 40 MIN: CPT | Mod: 25 | Performed by: FAMILY MEDICINE

## 2022-09-29 PROCEDURE — 17003 DESTRUCT PREMALG LES 2-14: CPT | Performed by: FAMILY MEDICINE

## 2022-09-29 PROCEDURE — 84550 ASSAY OF BLOOD/URIC ACID: CPT | Mod: ZL | Performed by: FAMILY MEDICINE

## 2022-09-29 PROCEDURE — 80053 COMPREHEN METABOLIC PANEL: CPT | Mod: ZL | Performed by: FAMILY MEDICINE

## 2022-09-29 PROCEDURE — G0008 ADMIN INFLUENZA VIRUS VAC: HCPCS

## 2022-09-29 PROCEDURE — 85025 COMPLETE CBC W/AUTO DIFF WBC: CPT | Mod: ZL | Performed by: FAMILY MEDICINE

## 2022-09-29 PROCEDURE — 80061 LIPID PANEL: CPT | Mod: ZL | Performed by: FAMILY MEDICINE

## 2022-09-29 PROCEDURE — 83036 HEMOGLOBIN GLYCOSYLATED A1C: CPT | Mod: ZL | Performed by: FAMILY MEDICINE

## 2022-09-29 PROCEDURE — 36415 COLL VENOUS BLD VENIPUNCTURE: CPT | Mod: ZL | Performed by: FAMILY MEDICINE

## 2022-09-29 PROCEDURE — 17000 DESTRUCT PREMALG LESION: CPT | Performed by: FAMILY MEDICINE

## 2022-09-29 RX ORDER — GABAPENTIN 300 MG/1
CAPSULE ORAL
Qty: 180 CAPSULE | Refills: 11 | Status: SHIPPED | OUTPATIENT
Start: 2022-09-29 | End: 2023-11-07

## 2022-09-29 RX ORDER — ALLOPURINOL 100 MG/1
100 TABLET ORAL DAILY
Qty: 90 TABLET | Refills: 11 | Status: SHIPPED | OUTPATIENT
Start: 2022-09-29 | End: 2023-10-13

## 2022-09-29 ASSESSMENT — ENCOUNTER SYMPTOMS
ARTHRALGIAS: 1
DIZZINESS: 1
JOINT SWELLING: 0
FEVER: 0
MYALGIAS: 1
SORE THROAT: 0
CHILLS: 0
ABDOMINAL PAIN: 0
HEMATURIA: 0
NERVOUS/ANXIOUS: 0
CONSTIPATION: 0
SHORTNESS OF BREATH: 0
WEAKNESS: 0
FREQUENCY: 0
PALPITATIONS: 0
DIARRHEA: 0
HEADACHES: 1
HEARTBURN: 0
COUGH: 0
NAUSEA: 0
PARESTHESIAS: 1
DYSURIA: 0
EYE PAIN: 0
HEMATOCHEZIA: 0

## 2022-09-29 ASSESSMENT — PAIN SCALES - GENERAL: PAINLEVEL: MILD PAIN (3)

## 2022-09-29 ASSESSMENT — ACTIVITIES OF DAILY LIVING (ADL): CURRENT_FUNCTION: NO ASSISTANCE NEEDED

## 2022-09-29 NOTE — NURSING NOTE
"Chief Complaint   Patient presents with     Medicare Visit     annual       Initial /84   Pulse 85   Temp 96.9  F (36.1  C) (Temporal)   Resp 18   Ht 1.74 m (5' 8.5\")   Wt 92.5 kg (204 lb)   SpO2 98%   BMI 30.57 kg/m   Estimated body mass index is 30.57 kg/m  as calculated from the following:    Height as of this encounter: 1.74 m (5' 8.5\").    Weight as of this encounter: 92.5 kg (204 lb).  Medication Reconciliation: complete    FOOD SECURITY SCREENING QUESTIONS  Hunger Vital Signs:  Within the past 12 months we worried whether our food would run out before we got money to buy more. Never  Within the past 12 months the food we bought just didn't last and we didn't have money to get more. Never        Advance care directive on file? yes      Suri Mcduffie LPN  "

## 2022-09-29 NOTE — PROGRESS NOTES
"SUBJECTIVE:   Ramo is a 72 year old who presents for Preventive Visit.      Patient has been advised of split billing requirements and indicates understanding: Yes  Are you in the first 12 months of your Medicare coverage?  No    Healthy Habits:     In general, how would you rate your overall health?  Good    Frequency of exercise:  2-3 days/week    Duration of exercise:  30-45 minutes    Do you usually eat at least 4 servings of fruit and vegetables a day, include whole grains    & fiber and avoid regularly eating high fat or \"junk\" foods?  No    Taking medications regularly:  Yes    Medication side effects:  None    Ability to successfully perform activities of daily living:  No assistance needed    Home Safety:  No safety concerns identified    Hearing Impairment:  Difficulty following a conversation in a noisy restaurant or crowded room    In the past 6 months, have you been bothered by leaking of urine?  No    In general, how would you rate your overall mental or emotional health?  Good      PHQ-2 Total Score: 0    Additional concerns today:  No    Do you feel safe in your environment? Yes    Have you ever done Advance Care Planning? (For example, a Health Directive, POLST, or a discussion with a medical provider or your loved ones about your wishes): Yes, advance care planning is on file.       Fall risk  Fallen 2 or more times in the past year?: No  Any fall with injury in the past year?: No    Cognitive Screening   1) Repeat 3 items (Leader, Season, Table)    2) Clock draw: NORMAL  3) 3 item recall: Recalls 2 objects   Results: NORMAL clock, 1-2 items recalled: COGNITIVE IMPAIRMENT LESS LIKELY    Mini-CogTM Copyright HARRY Linton. Licensed by the author for use in [facility]; reprinted with permission (ashlee@.[email or web address]). All rights reserved.      Do you have sleep apnea, excessive snoring or daytime drowsiness?: yes    Reviewed and updated as needed this visit by clinical staff   Tobacco  Allergies  " Meds   Med Hx  Surg Hx  Fam Hx  Soc Hx          Reviewed and updated as needed this visit by Provider                   Social History     Tobacco Use     Smoking status: Never Smoker     Smokeless tobacco: Never Used   Substance Use Topics     Alcohol use: No     Comment: H/O abuse and treatment, sober for 2 yrs.      If you drink alcohol do you typically have >3 drinks per day or >7 drinks per week? No    Alcohol Use 9/29/2022   Prescreen: >3 drinks/day or >7 drinks/week? No   Review current opioid prescriptions    For a patient with a current opioid prescription:    Reviewed potential Opioid Use Disorder (OUD) risk factors:  NA    Evaluate their pain severity and current treatment plan:     Provide information on non-opioid treatment options:    Refer to a specialist, as appropriate:    Get more information on pain management in the [facility] Pain Management Best Practices Inter-agency Task Force Report    Screen for potential Substance Use Disorders (SUDs)    Reviewed the patient s potential risk factors for SUDs:  NA    Refer to treatment or specialist, as appropriate:     A screening tool isn t required but you may use one:  Find more information in the National [place] on Drug Abuse Screening and Assessment Tools Chart    Current providers sharing in care for this patient include:   Patient Care Team:  Jim Díaz MD as PCP - General (Family Medicine)  Jim Díaz MD as Assigned PCP    The following health maintenance items are reviewed in Epic and correct as of today:  Health Maintenance   Topic Date Due     DIABETIC FOOT EXAM  Never done     ADVANCE CARE PLANNING  Never done     EYE EXAM  Never done     MEDICARE ANNUAL WELLNESS VISIT  Never done     MICROALBUMIN  07/08/2021     A1C  12/30/2021     COVID-19 Vaccine (5 - Booster for Moderna series) 05/16/2022     LIPID  06/30/2022     INFLUENZA VACCINE (1) 09/01/2022     DTAP/TDAP/TD IMMUNIZATION (3 - Td or Tdap) 10/16/2022     BMP  07/07/2023      "HEMOGLOBIN  07/07/2023     FALL RISK ASSESSMENT  09/29/2023     COLORECTAL CANCER SCREENING  03/23/2032     PHQ-2 (once per calendar year)  Completed     Pneumococcal Vaccine: 65+ Years  Completed     URINALYSIS  Completed     ZOSTER IMMUNIZATION  Completed     AORTIC ANEURYSM SCREENING (SYSTEM ASSIGNED)  Completed     HEPATITIS C SCREENING  Addressed     IPV IMMUNIZATION  Aged Out     MENINGITIS IMMUNIZATION  Aged Out     Labs reviewed in EPIC      Review of Systems      OBJECTIVE:   /84   Pulse 85   Temp 96.9  F (36.1  C) (Temporal)   Resp 18   Ht 1.74 m (5' 8.5\")   Wt 92.5 kg (204 lb)   SpO2 98%   BMI 30.57 kg/m   Estimated body mass index is 30.57 kg/m  as calculated from the following:    Height as of this encounter: 1.74 m (5' 8.5\").    Weight as of this encounter: 92.5 kg (204 lb).  Physical Exam          ASSESSMENT / PLAN:   Ramo was seen today for medicare visit and imm/inj.    Diagnoses and all orders for this visit:    Medicare annual wellness visit, subsequent    Gout, unspecified cause, unspecified chronicity, unspecified site  -     Uric acid; Future  -     Uric acid  -     allopurinol (ZYLOPRIM) 100 MG tablet; Take 1 tablet (100 mg) by mouth daily    Liver replaced by transplant due to alcoholic cirrhosis and HCC  -     Comprehensive metabolic panel; Future  -     Comprehensive metabolic panel    History of hepatocellular carcinoma    Alcoholism (H)  -     Comprehensive metabolic panel; Future  -     Comprehensive metabolic panel    Thrombocytopenia (H)  -     CBC with Platelets & Differential; Future  -     CBC with Platelets & Differential    Prediabetes  -     CBC with Platelets & Differential; Future  -     Comprehensive metabolic panel; Future  -     Lipid Profile; Future  -     Hemoglobin A1c; Future  -     Albumin Random Urine Quantitative with Creat Ratio; Future  -     TSH with free T4 reflex; Future  -     TSH with free T4 reflex  -     Hemoglobin A1c  -     Lipid Profile  -    " " Comprehensive metabolic panel  -     CBC with Platelets & Differential  -     Albumin Random Urine Quantitative with Creat Ratio    Stage 3a chronic kidney disease (H)  -     CBC with Platelets & Differential; Future  -     Comprehensive metabolic panel; Future  -     Comprehensive metabolic panel  -     CBC with Platelets & Differential    Essential hypertension  -     Comprehensive metabolic panel; Future  -     Comprehensive metabolic panel    Polyneuropathy  -     gabapentin (NEURONTIN) 300 MG capsule; TAKE 1 CAPSULE(300 MG) BY MOUTH TWICE DAILY    Prostate disorder  -     PSA tumor marker; Future  -     PSA tumor marker    Acute idiopathic gout of right foot  -     allopurinol (ZYLOPRIM) 100 MG tablet; Take 1 tablet (100 mg) by mouth daily    Carpal tunnel syndrome, bilateral  -     EMG; Future  -     Orthopedic  Referral; Future  -     WRIST SPLINT    Actinic keratosis  -     DESTRUCT PREMALIGNANT LESION, FIRST    High priority for 2019-nCoV vaccine  -     COVID-19,PF,PFIZER BOOSTER BIVALENT 12+Yrs    Other orders  -     FLU SHOT 65+ (FLUZONE HD)        Patient has been advised of split billing requirements and indicates understanding: Yes    COUNSELING:  Reviewed preventive health counseling, as reflected in patient instructions       Regular exercise       Healthy diet/nutrition       Fall risk prevention       Colon cancer screening       Prostate cancer screening    Estimated body mass index is 30.57 kg/m  as calculated from the following:    Height as of this encounter: 1.74 m (5' 8.5\").    Weight as of this encounter: 92.5 kg (204 lb).    Weight management plan: Discussed healthy diet and exercise guidelines    He reports that he has never smoked. He has never used smokeless tobacco.      Appropriate preventive services were discussed with this patient, including applicable screening as appropriate for cardiovascular disease, diabetes, osteopenia/osteoporosis, and glaucoma.  As appropriate for " age/gender, discussed screening for colorectal cancer, prostate cancer, breast cancer, and cervical cancer. Checklist reviewing preventive services available has been given to the patient.    Reviewed patients plan of care and provided an AVS. The Basic Care Plan (routine screening as documented in Health Maintenance) for Ramo meets the Care Plan requirement. This Care Plan has been established and reviewed with the Patient.    Counseling Resources:  ATP IV Guidelines  Pooled Cohorts Equation Calculator  Breast Cancer Risk Calculator  Breast Cancer: Medication to Reduce Risk  FRAX Risk Assessment  ICSI Preventive Guidelines  Dietary Guidelines for Americans, 2010  USDA's MyPlate  ASA Prophylaxis  Lung CA Screening    Jim Díaz MD  [facility]    Identified Health Risks:

## 2022-09-29 NOTE — PROGRESS NOTES
"Nursing Notes:   Suri Mcduffie LPN  2022 11:42 AM  Signed  Chief Complaint   Patient presents with     Medicare Visit     annual       Initial /84   Pulse 85   Temp 96.9  F (36.1  C) (Temporal)   Resp 18   Ht 1.74 m (5' 8.5\")   Wt 92.5 kg (204 lb)   SpO2 98%   BMI 30.57 kg/m   Estimated body mass index is 30.57 kg/m  as calculated from the following:    Height as of this encounter: 1.74 m (5' 8.5\").    Weight as of this encounter: 92.5 kg (204 lb).  Medication Reconciliation: complete    FOOD SECURITY SCREENING QUESTIONS  Hunger Vital Signs:  Within the past 12 months we worried whether our food would run out before we got money to buy more. Never  Within the past 12 months the food we bought just didn't last and we didn't have money to get more. Never        Advance care directive on file? yes      Suri Mcduffie LPN      SUBJECTIVE:  Ramo Lockwood  is a 72 year old male who comes in for Medicare wellness and follow-up of his other medical problems.  I saw him to [facility] and 2020. He previously got his care in [place].  He had alcoholic cirrhosis and hepatocellular carcinoma and underwent  donor liver transplant in 2019 at [facility].  He had embolization then microwave ablation x2 in the past and had ascites and portal vein thrombosis and varices prior to his transplant.     He has type 2 diabetes.  His hemoglobin A1c was 6.3% in March.  He gets regular labs through the [facility].  His last labs were in July. He doesn't check blood sugars at home.     He has hypertension and is currently on amlodipine 5 mg daily, 81 mg aspirin daily, tacrolimus 1 mg extended release daily.  He has chronic kidney disease stage III.    He has bilateral hand numbness. Worse on the right. Bothers to hold his phone or a book. He tried CBD.  He has not tried wrist splints.    Follows with dermatology because of his immunosuppression after his transplant.  Has an actinic on his " left forearm and 5 on his scalp and face that he would like to treat.        He is vaccinated and boosted for COVID-19 but has not had the new booster.  He has not had his flu shot this year.  He is due for Boostrix but is up-to-date on Pneumovax and Shingrix.  He just had colonoscopy this spring.    His dad just turned 100 this summer.     Past Medical, Family, and Social History reviewed and updated as noted below.   ROS is negative except as noted above       Allergies   Allergen Reactions     Codeine Nausea     Codeine Camsylate    ,   Family History   Problem Relation Age of Onset     Cardiovascular Father         MI     Bladder Cancer Father    ,   Current Outpatient Medications   Medication     allopurinol (ZYLOPRIM) 100 MG tablet     aspirin (ASA) 81 MG chewable tablet     atorvastatin (LIPITOR) 20 MG tablet     gabapentin (NEURONTIN) 300 MG capsule     Magnesium Cl-Calcium Carbonate 71.5-119 MG TBEC     tacrolimus (ASTAGRAF XL) 1 MG 24 hr capsule     amLODIPine (NORVASC) 10 MG tablet     chlorhexidine (PERIDEX) 0.12 % solution     predniSONE (DELTASONE) 10 MG tablet     No current facility-administered medications for this visit.   ,   Past Medical History:   Diagnosis Date     Acute conjunctivitis, unspecified 11/04/2000     Alcoholic cirrhosis (H)      Chronic pain     back pain     Diabetes mellitus (H)      Gout      Hepatocellular carcinoma (H)      Hyperlipidemia      Hypertension      Special screening for malignant neoplasms, colon 02/16/2009   ,   Patient Active Problem List    Diagnosis Date Noted     Prediabetes 04/22/2021     Priority: Medium     Liver replaced by transplant due to alcoholic cirrhosis and HCC 09/30/2020     Priority: Medium     CKD (chronic kidney disease) stage 3, GFR 30-59 ml/min 09/30/2020     Priority: Medium     Alcoholism (H), in remission 09/30/2020     Priority: Medium     Osteoporosis 07/09/2020     Priority: Medium     IFG (impaired fasting glucose) 05/11/2020      Priority: Medium     Hyperkalemia 06/26/2019     Priority: Medium     Personal history of cirrhosis 05/14/2019     Priority: Medium     History of hepatocellular carcinoma 05/14/2019     Priority: Medium     Acute renal failure (H) 05/05/2019     Priority: Medium     Rectal varices 10/30/2018     Priority: Medium     Portal hypertensive gastropathy (H) 10/30/2018     Priority: Medium     Hepatocellular carcinoma (H) 04/11/2018     Priority: Medium     Influenza A 01/15/2018     Priority: Medium     Esophageal varices in cirrhosis (H) 11/09/2015     Priority: Medium     Hyponatremia 06/05/2015     Priority: Medium     C. 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     Priority: Medium     Diarrhea 04/19/2015     Priority: Medium     Seizure (H) 04/19/2015     Priority: Medium     Nondependent alcohol abuse, continuous drinking behavior 05/18/2014     Priority: Medium     Overview:   IMO Update 10/11       GI bleed 05/18/2014     Priority: Medium     Alcoholic cirrhosis of liver (H) 05/18/2014     Priority: Medium     Ascites 05/18/2014     Priority: Medium     Chronic hyponatremia 05/18/2014     Priority: Medium     Thrombocytopenia (H) 05/18/2014     Priority: Medium     Elevated INR 05/18/2014     Priority: Medium     Anemia 02/17/2014     Priority: Medium     Acute maxillary sinusitis 12/26/2013     Priority: Medium     Epistaxis 12/24/2013     Priority: Medium     Hyperlipidemia 12/19/2011     Priority: Medium     Overview:   IMO Update 10/11       Type II diabetes mellitus (H) 03/23/2011     Priority: Medium     Overview:   IMO Update 10/11       Neoplasm of uncertain behavior of lip, oral cavity, 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  PT.  L-spine and T-spine x-rays. L-spine MRI 9/3/02. Lumbar degenerative disc disease. [facility].  IMO Update 10/11     ,   Past Surgical History:   Procedure Laterality Date     COLONOSCOPY       HERNIA REPAIR, UMBILICAL  10/02/2015     PHACOEMULSIFICATION WITH STANDARD INTRAOCULAR LENS IMPLANT  6/10/2013    Procedure: PHACOEMULSIFICATION WITH STANDARD INTRAOCULAR LENS IMPLANT;  CATARACT EXTRACTION WITH INTRA OCLULAR LENS LEFT;  Surgeon: Benoit Philip MD;  Location: [facility] OR     PHACOEMULSIFICATION WITH STANDARD INTRAOCULAR LENS IMPLANT  2013    Procedure: PHACOEMULSIFICATION WITH STANDARD INTRAOCULAR LENS IMPLANT;  CATARACT EXTRACTION WITH INTRA OCULAR LENS RIGHT;  Surgeon: Benoit Philip MD;  Location: [facility] OR     TRANSPLANT LIVER RECIPIENT  DONOR  2019    [facility]     umbilical hernia repair with mesh  2019    [facility]    and   Social History     Tobacco Use     Smoking status: Never Smoker     Smokeless tobacco: Never Used   Substance Use Topics     Alcohol use: No     Comment: H/O abuse and treatment, sober for 2 yrs.      OBJECTIVE:  /84   Pulse 85   Temp 96.9  F (36.1  C) (Temporal)   Resp 18   Ht 1.74 m (5' 8.5\")   Wt 92.5 kg (204 lb)   SpO2 98%   BMI 30.57 kg/m     EXAM:  General Appearance: Pleasant, alert, appropriate appearance for age. No acute distress  Head Exam: Normal. Normocephalic, atraumatic.  Eye Exam:  Normal external eyes, conjunctivae, lids, cornea. DIANE. EOMI  Ear Exam: Normal TM's bilaterally. Normal auditory canals and external ears. Non-tender.  Nose Exam: Normal external nose, mucus membranes, and septum.  OroPharynx Exam:  Dental hygiene adequate. Normal buccal mucosa. Normal pharynx.  Neck Exam:  Supple, no masses or nodes. No audible bruits  Thyroid Exam: No nodules or enlargement.  Chest/Respiratory Exam: Normal chest wall and respirations. Clear to auscultation.  Cardiovascular Exam: Regular rate and rhythm. S1, S2, no murmur, " click, gallop, or rubs.  Gastrointestinal Exam: Soft, non-tender, no masses or organomegaly.  Lymphatic Exam: Non-palpable nodes in neck, clavicular regions.  Musculoskeletal Exam: Back is straight and non-tender, full ROM of upper and lower extremities.  Negative Tinel's sign bilaterally but Phalen's is positive on the right negative on the left.  Foot Exam: Left and right foot: good pedal pulses, no lesions, nail hygiene good. Normal sensory testing with #10 monofilament.   Skin: no rash or abnormalities.  Actinic keratosis on his left forearm, and 5 on his scalp that are all frozen lightly with liquid nitrogen.  Neurologic Exam: Nonfocal, normal gross motor, tone coordination and no tremor.  Psychiatric Exam: Alert and oriented - appropriate affect.   Results for orders placed or performed in visit on 09/29/22   Uric acid     Status: Normal   Result Value Ref Range    Uric Acid 5.8 4.4 - 7.6 mg/dL   TSH with free T4 reflex     Status: Normal   Result Value Ref Range    TSH 1.60 0.40 - 4.00 mU/L   PSA tumor marker     Status: Normal   Result Value Ref Range    PSA Tumor Marker 0.25 0.00 - 4.00 ug/L    Narrative    The DXI Access PSAS WHO assay is a two site immunoenzymatic   assay. Assay values obtained with different assay methods cannot be used   interchangeably due to differences in assay methods and reagent specificity.   Hemoglobin A1c     Status: Normal   Result Value Ref Range    Hemoglobin A1C 5.8 4.0 - 6.2 %   Lipid Profile     Status: None   Result Value Ref Range    Cholesterol 151 <200 mg/dL    Triglycerides 131 <150 mg/dL    Direct Measure HDL 59 23 - 92 mg/dL    LDL Cholesterol Calculated 66 <=100 mg/dL    Non HDL Cholesterol 92 <130 mg/dL    Patient Fasting > 8hrs? Yes     Narrative    Cholesterol  Desirable:  <200 mg/dL    Triglycerides  Normal:  Less than 150 mg/dL  Borderline High:  150-199 mg/dL  High:  200-499 mg/dL  Very High:  Greater than or equal to 500 mg/dL    Direct Measure HDL  Female:   Greater than or equal to 50 mg/dL   Male:  Greater than or equal to 40 mg/dL    LDL Cholesterol  Desirable:  <100mg/dL  Above Desirable:  100-129 mg/dL   Borderline High:  130-159 mg/dL   High:  160-189 mg/dL   Very High:  >= 190 mg/dL    Non HDL Cholesterol  Desirable:  130 mg/dL  Above Desirable:  130-159 mg/dL  Borderline High:  160-189 mg/dL  High:  190-219 mg/dL  Very High:  Greater than or equal to 220 mg/dL   Comprehensive metabolic panel     Status: Abnormal   Result Value Ref Range    Sodium 138 134 - 144 mmol/L    Potassium 4.9 3.5 - 5.1 mmol/L    Chloride 107 98 - 107 mmol/L    Carbon Dioxide (CO2) 24 21 - 31 mmol/L    Anion Gap 7 3 - 14 mmol/L    Urea Nitrogen 17 7 - 25 mg/dL    Creatinine 1.31 (H) 0.70 - 1.30 mg/dL    Calcium 9.4 8.6 - 10.3 mg/dL    Glucose 129 (H) 70 - 105 mg/dL    Alkaline Phosphatase 73 34 - 104 U/L    AST 18 13 - 39 U/L    ALT 21 7 - 52 U/L    Protein Total 6.8 6.4 - 8.9 g/dL    Albumin 4.2 3.5 - 5.7 g/dL    Bilirubin Total 0.6 0.3 - 1.0 mg/dL    GFR Estimate 57 (L) >60 mL/min/1.73m2   CBC with platelets and differential     Status: Abnormal   Result Value Ref Range    WBC Count 8.2 4.0 - 11.0 10e3/uL    RBC Count 4.74 4.40 - 5.90 10e6/uL    Hemoglobin 14.2 13.3 - 17.7 g/dL    Hematocrit 41.3 40.0 - 53.0 %    MCV 87 78 - 100 fL    MCH 30.0 26.5 - 33.0 pg    MCHC 34.4 31.5 - 36.5 g/dL    RDW 12.9 10.0 - 15.0 %    Platelet Count 146 (L) 150 - 450 10e3/uL    % Neutrophils 84 %    % Lymphocytes 9 %    % Monocytes 6 %    % Eosinophils 0 %    % Basophils 0 %    % Immature Granulocytes 1 %    NRBCs per 100 WBC 0 <1 /100    Absolute Neutrophils 6.8 1.6 - 8.3 10e3/uL    Absolute Lymphocytes 0.7 (L) 0.8 - 5.3 10e3/uL    Absolute Monocytes 0.5 0.0 - 1.3 10e3/uL    Absolute Eosinophils 0.0 0.0 - 0.7 10e3/uL    Absolute Basophils 0.0 0.0 - 0.2 10e3/uL    Absolute Immature Granulocytes 0.0 <=0.4 10e3/uL    Absolute NRBCs 0.0 10e3/uL   CBC with Platelets & Differential     Status: Abnormal     Narrative    The following orders were created for panel order CBC with Platelets & Differential.  Procedure                               Abnormality         Status                     ---------                               -----------         ------                     CBC with platelets and d...[[id number]]  Abnormal            Final result                 Please view results for these tests on the individual orders.      ASSESSMENT/Plan :    Ramo was seen today for medicare visit and imm/inj.    Diagnoses and all orders for this visit:    Medicare annual wellness visit, subsequent    Gout, unspecified cause, unspecified chronicity, unspecified site  -     Uric acid; Future  -     Uric acid  -     allopurinol (ZYLOPRIM) 100 MG tablet; Take 1 tablet (100 mg) by mouth daily    Liver replaced by transplant due to alcoholic cirrhosis and HCC  -     Comprehensive metabolic panel; Future  -     Comprehensive metabolic panel    History of hepatocellular carcinoma    Alcoholism (H)  -     Comprehensive metabolic panel; Future  -     Comprehensive metabolic panel    Thrombocytopenia (H)  -     CBC with Platelets & Differential; Future  -     CBC with Platelets & Differential    Prediabetes  -     CBC with Platelets & Differential; Future  -     Comprehensive metabolic panel; Future  -     Lipid Profile; Future  -     Hemoglobin A1c; Future  -     Albumin Random Urine Quantitative with Creat Ratio; Future  -     TSH with free T4 reflex; Future  -     TSH with free T4 reflex  -     Hemoglobin A1c  -     Lipid Profile  -     Comprehensive metabolic panel  -     CBC with Platelets & Differential  -     Albumin Random Urine Quantitative with Creat Ratio    Stage 3a chronic kidney disease (H)  -     CBC with Platelets & Differential; Future  -     Comprehensive metabolic panel; Future  -     Comprehensive metabolic panel  -     CBC with Platelets & Differential    Essential hypertension  -     Comprehensive metabolic panel;  Future  -     Comprehensive metabolic panel    Polyneuropathy  -     gabapentin (NEURONTIN) 300 MG capsule; TAKE 1 CAPSULE(300 MG) BY MOUTH TWICE DAILY    Prostate disorder  -     PSA tumor marker; Future  -     PSA tumor marker    Acute idiopathic gout of right foot  -     allopurinol (ZYLOPRIM) 100 MG tablet; Take 1 tablet (100 mg) by mouth daily    Carpal tunnel syndrome, bilateral  -     EMG; Future  -     Orthopedic  Referral; Future  -     WRIST SPLINT    Actinic keratosis  -     DESTRUCT PREMALIGNANT LESION, FIRST  -     DESTRUCT PREMALIGNANT LESION, 2-14    High priority for 2019-nCoV vaccine  -     COVID-19,PF,PFIZER BOOSTER BIVALENT 12+Yrs    Other orders  -     FLU SHOT 65+ (FLUZONE HD)      Will notify of lab results when available. Discussed diet, exercise and healthy lifestyle changes. Continue current medications.     Advised regarding the pain and vesiculation reaction that could be expected from cyrotherapy.     Flu shot and COVID-19 [place] 1 booster given today.    Referred for EMG and referral to Dr. Weiss.  We will try wrist splints in the interim.    I think we can change his diagnosis to prediabetes has looking back he had 1 random blood sugar almost 10 years ago that was over 200 during a hospitalization.  There is no question that he has insulin resistance and we discussed this in detail.  Discussed carbohydrates and how that plays a role.  He is not on any medications and not checking regularly and I think gets find that he continues that way we will just continue to monitor with labs periodically.    A total of 45 minutes was spent with the patient, reviewing records, tests, ordering medications, tests or procedures and documenting clinical information in the EHR in addition to Medicare Wellness.     Jim Díaz MD      Answers for HPI/ROS submitted by the patient on 9/29/2022  In general, how would you rate your overall physical health?: good  Frequency of exercise:: 2-3  "days/week  Do you usually eat at least 4 servings of fruit and vegetables a day, include whole grains & fiber, and avoid regularly eating high fat or \"junk\" foods? : No  Taking medications regularly:: Yes  Medication side effects:: None  Activities of Daily Living: no assistance needed  Home safety: no safety concerns identified  Hearing Impairment:: difficulty following a conversation in a noisy restaurant or crowded room  In the past 6 months, have you been bothered by leaking of urine?: No  abdominal pain: No  Blood in stool: No  Blood in urine: No  chest pain: No  chills: No  congestion: No  constipation: No  cough: No  diarrhea: No  dizziness: Yes  ear pain: Yes  eye pain: No  nervous/anxious: No  fever: No  frequency: No  genital sores: No  headaches: Yes  hearing loss: Yes  heartburn: No  arthralgias: Yes  joint swelling: No  peripheral edema: No  mood changes: No  myalgias: Yes  nausea: No  dysuria: No  palpitations: No  Skin sensation changes: Yes  sore throat: No  urgency: No  rash: Yes  shortness of breath: No  visual disturbance: No  weakness: No  In general, how would you rate your overall mental or emotional health?: good  Additional concerns today:: No  Duration of exercise:: 30-45 minutes      "

## 2022-09-30 ENCOUNTER — TELEPHONE (OUTPATIENT)
Dept: LAB | Facility: OTHER | Age: 72
End: 2022-09-30

## 2022-09-30 NOTE — TELEPHONE ENCOUNTER
The patient had his labs done yesterday and the orders were already done for that.  Suri Mcduffie LPN..................9/30/2022   8:31 AM

## 2022-10-05 ENCOUNTER — LAB (OUTPATIENT)
Dept: LAB | Facility: OTHER | Age: 72
End: 2022-10-05
Payer: MEDICARE

## 2022-10-05 DIAGNOSIS — Z00.00 LABORATORY EXAMINATION ORDERED AS PART OF A ROUTINE GENERAL MEDICAL EXAMINATION: Primary | ICD-10-CM

## 2022-10-05 DIAGNOSIS — Z94.4 LIVER REPLACED BY TRANSPLANT (H): ICD-10-CM

## 2022-10-05 PROCEDURE — 99000 SPECIMEN HANDLING OFFICE-LAB: CPT

## 2022-10-05 PROCEDURE — 36415 COLL VENOUS BLD VENIPUNCTURE: CPT | Mod: ZL

## 2022-10-19 NOTE — PROGRESS NOTES
Fluoro time for this case was 10 seconds, less than 5 min  Was patient held? NO  If yes, by whom?   
Injection Documentation of Provider History    PER-PROVIDER HISTORY, Dr. DONNELL Dave  Is this for treatment of acute herpes zoster, post herpetic neuralgia, post-decompressive radiculitis, or post-surgical scarring?   No  Does the patient have radiculopathy or sciatica?  Yes  How long has the patient had any physical therapy, home therapy or chiropractor care?  >4 weeks.  How long has the patient taken NSaids or muscle relaxants?  >4 weeks.  Is there any history of systemic/local infection or unstable medical conditions?  No      
Verified post-procedural status.  There were no complicationsand patient has no symptoms..  The patient had no questions or concerns.Patient reports pain scale of 3/10, and No bleeding.    
The wound was explored to base in bloodless field./No foreign body/Enlargement of wound was performed.

## 2022-11-14 DIAGNOSIS — G56.03 BILATERAL CARPAL TUNNEL SYNDROME: Primary | ICD-10-CM

## 2022-11-15 ENCOUNTER — OFFICE VISIT (OUTPATIENT)
Dept: NEUROLOGY | Facility: OTHER | Age: 72
End: 2022-11-15
Attending: PSYCHIATRY & NEUROLOGY
Payer: MEDICARE

## 2022-11-15 VITALS
DIASTOLIC BLOOD PRESSURE: 81 MMHG | SYSTOLIC BLOOD PRESSURE: 127 MMHG | WEIGHT: 206.8 LBS | RESPIRATION RATE: 22 BRPM | OXYGEN SATURATION: 98 % | BODY MASS INDEX: 30.99 KG/M2 | HEART RATE: 78 BPM | TEMPERATURE: 97.6 F

## 2022-11-15 DIAGNOSIS — G62.1 ALCOHOL-INDUCED POLYNEUROPATHY (H): ICD-10-CM

## 2022-11-15 DIAGNOSIS — G56.03 BILATERAL CARPAL TUNNEL SYNDROME: Primary | ICD-10-CM

## 2022-11-15 PROCEDURE — 95913 NRV CNDJ TEST 13/> STUDIES: CPT | Performed by: PSYCHIATRY & NEUROLOGY

## 2022-11-15 PROCEDURE — G0463 HOSPITAL OUTPT CLINIC VISIT: HCPCS | Mod: 25 | Performed by: PSYCHIATRY & NEUROLOGY

## 2022-11-15 PROCEDURE — 99204 OFFICE O/P NEW MOD 45 MIN: CPT | Mod: 25 | Performed by: PSYCHIATRY & NEUROLOGY

## 2022-11-15 PROCEDURE — 95923 AUTONOMIC NRV SYST FUNJ TEST: CPT | Mod: 26 | Performed by: PSYCHIATRY & NEUROLOGY

## 2022-11-15 PROCEDURE — 95886 MUSC TEST DONE W/N TEST COMP: CPT | Mod: 26 | Performed by: PSYCHIATRY & NEUROLOGY

## 2022-11-15 PROCEDURE — 95923 AUTONOMIC NRV SYST FUNJ TEST: CPT | Performed by: PSYCHIATRY & NEUROLOGY

## 2022-11-15 PROCEDURE — 95913 NRV CNDJ TEST 13/> STUDIES: CPT | Mod: 26 | Performed by: PSYCHIATRY & NEUROLOGY

## 2022-11-15 PROCEDURE — 95886 MUSC TEST DONE W/N TEST COMP: CPT | Performed by: PSYCHIATRY & NEUROLOGY

## 2022-11-15 ASSESSMENT — PAIN SCALES - GENERAL: PAINLEVEL: SEVERE PAIN (6)

## 2022-11-15 NOTE — PROGRESS NOTES
Visit Date: 11/15/2022    REFERRING PHYSICIAN:  Dr. Jmi Díaz.    HISTORY OF PRESENT ILLNESS:  The patient is a 72-year-old who relates about a 1-year history of intermittent paresthesia affecting the right hand more often than the left.  There is sometimes discomfort extending up the forearm.  Symptoms often interfere with sleep.  On the right side, paresthesias are most severe in the first digit, but involve the first 4 digits and on the left, symptoms are also most severe for the first digit with only the first 2 digits involved.  He thinks he is losing some  strength.  He does not complain of any neck pain.  There has been no obvious injury or precipitating event.    PAST MEDICAL HISTORY:  Significant for extensive alcohol abuse and complications of same.  The patient is known to have hepatic cirrhosis and esophageal varices.  He also has mild diabetes, which is diet controlled.    REVIEW OF SYSTEMS:  10-system review of systems:  Primarily significant for some sensory loss in the feet present for about 5 years.    SOCIAL HISTORY:  The patient retired 2 years ago.  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  He has a history of alcohol abuse, but this is in remission, by report.    FAMILY HISTORY:  Negative for neuropathy.    PHYSICAL EXAMINATION:  The patient is 69 inches tall and weighs 207 pounds, blood pressure is 127/81.  He is afebrile.  There is weakness on the right for the abductors of the thumb with normal strength for this muscle group on the left.  There is normal strength for the forearm flexors and extensors and the finger extensors.  Gait is normal.  Reflexes are normoactive and symmetric in the upper extremities, but hypoactive at the ankles.  Pinprick is reduced in the field of the right median nerve with splitting of the fourth digit.    NERVE CONDUCTION STUDIES:  Motor nerve conduction testing was performed bilaterally for the median and ulnar nerves.  There was  severe latency prolongation, conduction block, and slowing at the wrist for the right median nerve with moderate latency prolongation and slowing for the left.  The ulnar studies were normal.  H reflex latencies were normal throughout.    Orthodromic mixed conduction studies were performed bilaterally for the median and ulnar nerves.  Antidromic sensory nerve conduction studies were performed for the fifth digital nerves.  No waveform could be obtained for the right median nerve.  Otherwise, amplitudes were within normal limits.  There was moderate latency prolongation and slowing at the wrist for the left median nerve.    Sympathetic skin response testing was performed stimulating at the right elbow and recording from the palm of the hand.  A biphasic waveform with normal latency, but reduced amplitude was obtained.    MONOPOLAR EMG NEEDLE EXAMINATION:  Monopolar EMG needle examination was performed bilaterally for the first dorsal interosseous, extensor digitorum communis, flexor carpi radialis, triceps, and brachioradialis.  All of the tested muscles showed normal insertional activity.  Motor units were normal in size with normal recruitment and interference.    IMPRESSION:  The patient has findings on physical exam and sympathetic skin response to suggest the presence of at least mild small fiber polyneuropathy.  This is almost certainly relates to his history of alcohol abuse with a history of diabetes could be contributing.  The presence of the generalized neuropathy predisposes the patient to focal compressive neuropathy.  Accordingly, he has bilateral carpal tunnel syndrome.  Of course, the type of work that he is performed throughout his adult life would also put him at significant risk for developing this problem.  The carpal tunnel syndrome is graded as severe on the right and moderate on the left.  Even on the left side, the degree of conduction slowing seen for the median nerve at the wrist is great  enough that improvement is unlikely with nonsurgical treatment.    Findings were reviewed with the patient.    Tal Izquierdo MD        D: 11/15/2022   T: 11/15/2022   MT: LIUDMILA    Name:     APOLINAR GOMEZ  MRN:      [id number]        Account:    [id number]   :      1950           Visit Date: 11/15/2022     Document: [id number]

## 2022-11-15 NOTE — LETTER
11/15/2022         RE: Ramo Lockwood  [address]  [facility] [address]        Dear Colleague,    Thank you for referring your patient, Ramo Lockwood, to the [facility]. Please see a copy of my visit note below.    Visit Date: 11/15/2022    REFERRING PHYSICIAN:  Dr. Jim Díaz.    HISTORY OF PRESENT ILLNESS:  The patient is a 72-year-old who relates about a 1-year history of intermittent paresthesia affecting the right hand more often than the left.  There is sometimes discomfort extending up the forearm.  Symptoms often interfere with sleep.  On the right side, paresthesias are most severe in the first digit, but involve the first 4 digits and on the left, symptoms are also most severe for the first digit with only the first 2 digits involved.  He thinks he is losing some  strength.  He does not complain of any neck pain.  There has been no obvious injury or precipitating event.    PAST MEDICAL HISTORY:  Significant for extensive alcohol abuse and complications of same.  The patient is known to have hepatic cirrhosis and esophageal varices.  He also has mild diabetes, which is diet controlled.    REVIEW OF SYSTEMS:  10-system review of systems:  Primarily significant for some sensory loss in the feet present for about 5 years.    SOCIAL HISTORY:  The patient retired 2 years ago.  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  He has a history of alcohol abuse, but this is in remission, by report.    FAMILY HISTORY:  Negative for neuropathy.    PHYSICAL EXAMINATION:  The patient is 69 inches tall and weighs 207 pounds, blood pressure is 127/81.  He is afebrile.  There is weakness on the right for the abductors of the thumb with normal strength for this muscle group on the left.  There is normal strength for the forearm flexors and extensors and the finger extensors.  Gait is normal.  Reflexes are normoactive and symmetric in the upper extremities, but  hypoactive at the ankles.  Pinprick is reduced in the field of the right median nerve with splitting of the fourth digit.    NERVE CONDUCTION STUDIES:  Motor nerve conduction testing was performed bilaterally for the median and ulnar nerves.  There was severe latency prolongation, conduction block, and slowing at the wrist for the right median nerve with moderate latency prolongation and slowing for the left.  The ulnar studies were normal.  H reflex latencies were normal throughout.    Orthodromic mixed conduction studies were performed bilaterally for the median and ulnar nerves.  Antidromic sensory nerve conduction studies were performed for the fifth digital nerves.  No waveform could be obtained for the right median nerve.  Otherwise, amplitudes were within normal limits.  There was moderate latency prolongation and slowing at the wrist for the left median nerve.    Sympathetic skin response testing was performed stimulating at the right elbow and recording from the palm of the hand.  A biphasic waveform with normal latency, but reduced amplitude was obtained.    MONOPOLAR EMG NEEDLE EXAMINATION:  Monopolar EMG needle examination was performed bilaterally for the first dorsal interosseous, extensor digitorum communis, flexor carpi radialis, triceps, and brachioradialis.  All of the tested muscles showed normal insertional activity.  Motor units were normal in size with normal recruitment and interference.    IMPRESSION:  The patient has findings on physical exam and sympathetic skin response to suggest the presence of at least mild small fiber polyneuropathy.  This is almost certainly relates to his history of alcohol abuse with a history of diabetes could be contributing.  The presence of the generalized neuropathy predisposes the patient to focal compressive neuropathy.  Accordingly, he has bilateral carpal tunnel syndrome.  Of course, the type of work that he is performed throughout his adult life would also  put him at significant risk for developing this problem.  The carpal tunnel syndrome is graded as severe on the right and moderate on the left.  Even on the left side, the degree of conduction slowing seen for the median nerve at the wrist is great enough that improvement is unlikely with nonsurgical treatment.    Findings were reviewed with the patient.    Tal Izquierdo MD        D: 11/15/2022   T: 11/15/2022   MT: [email or web address]    Name:     APOLINAR GOMEZ  MRN:      [id number]        Account:    [id number]   :      1950           Visit Date: 11/15/2022     Document: [id number]      Again, thank you for allowing me to participate in the care of your patient.        Sincerely,        Tal Izquierdo MD

## 2022-11-15 NOTE — NURSING NOTE
"Chief Complaint   Patient presents with     Numbness     BUE numbness and tingling in arms and hands   Pt is being seen today for bilateral arm/hand pain, numbness, and tingling.    Initial /81 (BP Location: Right arm, Patient Position: Sitting, Cuff Size: Adult Regular)   Pulse 78   Temp 97.6  F (36.4  C) (Tympanic)   Resp 22   Wt 93.8 kg (206 lb 12.8 oz)   SpO2 98%   BMI 30.99 kg/m   Estimated body mass index is 30.99 kg/m  as calculated from the following:    Height as of 9/29/22: 1.74 m (5' 8.5\").    Weight as of this encounter: 93.8 kg (206 lb 12.8 oz).  Medication Reconciliation: complete       FOOD SECURITY SCREENING QUESTIONS:    The next two questions are to help us understand your food security.  If you are feeling you need any assistance in this area, we have resources available to support you today.    Hunger Vital Signs:  Within the past 12 months we worried whether our food would run out before we got money to buy more. Never  Within the past 12 months the food we bought just didn't last and we didn't have money to get more. Never  Valentine Peters,VF on 11/15/2022 at 10:38 AM      Valentine Peters    Advance Care Directive reviewed    "

## 2022-11-18 ENCOUNTER — HOSPITAL ENCOUNTER (OUTPATIENT)
Dept: GENERAL RADIOLOGY | Facility: OTHER | Age: 72
Discharge: HOME | End: 2022-11-18
Attending: SPECIALIST
Payer: MEDICARE

## 2022-11-18 ENCOUNTER — OFFICE VISIT (OUTPATIENT)
Dept: ORTHOPEDICS | Facility: OTHER | Age: 72
End: 2022-11-18
Attending: FAMILY MEDICINE
Payer: MEDICARE

## 2022-11-18 VITALS — HEART RATE: 95 BPM | OXYGEN SATURATION: 97 %

## 2022-11-18 DIAGNOSIS — G56.03 BILATERAL CARPAL TUNNEL SYNDROME: ICD-10-CM

## 2022-11-18 DIAGNOSIS — G56.03 CARPAL TUNNEL SYNDROME, BILATERAL: ICD-10-CM

## 2022-11-18 PROCEDURE — G0463 HOSPITAL OUTPT CLINIC VISIT: HCPCS

## 2022-11-18 PROCEDURE — 73110 X-RAY EXAM OF WRIST: CPT | Mod: RT

## 2022-11-18 PROCEDURE — 99214 OFFICE O/P EST MOD 30 MIN: CPT | Performed by: SPECIALIST

## 2022-11-18 ASSESSMENT — PAIN SCALES - GENERAL: PAINLEVEL: MODERATE PAIN (4)

## 2022-11-18 NOTE — PROGRESS NOTES
Patient is here for consult on his carpal tunnel.  Gaby Rain LPN .....................11/18/2022 9:13 AM

## 2022-11-18 NOTE — PROGRESS NOTES
Visit Date: 2022    HISTORY OF PRESENT ILLNESS:  Ramo Lockwood is a 72-year-old right-hand dominant, retired  from the [employer].  I am seeing him for bilateral hand numbness, right greater than left.  This has been going on for some time.  He saw Dr. Izquierdo and underwent EMG studies and was referred for evaluation.  He does have a history of some neck discomfort.    PHYSICAL EXAMINATION:  Today reveals a 72-year-old male, alert and oriented x 3, and appropriate.  Gait and station are appropriate.  He is well kempt.  Examination of both upper extremities reveals full and symmetric range of motion of the elbows, wrists.  Examination of both hands showed no evidence of atrophic skin change, adenopathy, or focal weakness.  The patient shows no evidence of thenar atrophy.  He has numbness in the hands prior to provocative maneuvers.    IMAGING:  X-rays of both wrists are reviewed and obtained in the office today.  The plain film radiographs reveal basilar joint arthrosis, which is mild.  There is some evidence of calcification of the TFCC.  He does have calcification of the radial and ulnar arteries.  EMG studies also reviewed performed by Dr. Izquierdo.  This is dated 11/15/2022.  This shows severe carpal tunnel syndrome on the right, moderate carpal tunnel on the left.  In addition, he does have evidence of small fiber polyneuropathy related probably to alcohol use.    IMPRESSION:  Carpal tunnel syndrome, severe on the right, moderate on the left with an overlying small fiber peripheral neuropathy.    PLAN:  We discussed options at length.  We discussed proceeding with bilateral endoscopic carpal tunnel release.  Risks, complications and benefits reviewed, and this can be scheduled at his convenience.    Chad Weiss MD        D: 2022   T: 2022   MT: [id number]    Name:     RAMO LOCKWOOD  MRN:      [id number]        Account:    [id number]   :      1950           Visit  Date: 11/18/2022     Document: [id number]

## 2022-12-22 ENCOUNTER — OFFICE VISIT (OUTPATIENT)
Dept: FAMILY MEDICINE | Facility: OTHER | Age: 72
End: 2022-12-22
Attending: FAMILY MEDICINE
Payer: MEDICARE

## 2022-12-22 VITALS
HEIGHT: 68 IN | TEMPERATURE: 98.1 F | BODY MASS INDEX: 30.49 KG/M2 | HEART RATE: 88 BPM | SYSTOLIC BLOOD PRESSURE: 114 MMHG | WEIGHT: 201.2 LBS | OXYGEN SATURATION: 95 % | RESPIRATION RATE: 20 BRPM | DIASTOLIC BLOOD PRESSURE: 84 MMHG

## 2022-12-22 DIAGNOSIS — Z94.4 LIVER REPLACED BY TRANSPLANT (H): ICD-10-CM

## 2022-12-22 DIAGNOSIS — G56.03 CARPAL TUNNEL SYNDROME, BILATERAL: ICD-10-CM

## 2022-12-22 DIAGNOSIS — Z01.818 PREOP GENERAL PHYSICAL EXAM: Primary | ICD-10-CM

## 2022-12-22 DIAGNOSIS — N18.31 STAGE 3A CHRONIC KIDNEY DISEASE (H): ICD-10-CM

## 2022-12-22 DIAGNOSIS — R73.03 PREDIABETES: ICD-10-CM

## 2022-12-22 DIAGNOSIS — G62.9 POLYNEUROPATHY: ICD-10-CM

## 2022-12-22 LAB
ANION GAP SERPL CALCULATED.3IONS-SCNC: 10 MMOL/L (ref 7–15)
BASOPHILS # BLD AUTO: 0 10E3/UL (ref 0–0.2)
BASOPHILS NFR BLD AUTO: 0 %
BUN SERPL-MCNC: 19.7 MG/DL (ref 8–23)
CALCIUM SERPL-MCNC: 9.4 MG/DL (ref 8.8–10.2)
CHLORIDE SERPL-SCNC: 105 MMOL/L (ref 98–107)
CREAT SERPL-MCNC: 1.38 MG/DL (ref 0.67–1.17)
DEPRECATED HCO3 PLAS-SCNC: 24 MMOL/L (ref 22–29)
EOSINOPHIL # BLD AUTO: 0.1 10E3/UL (ref 0–0.7)
EOSINOPHIL NFR BLD AUTO: 2 %
ERYTHROCYTE [DISTWIDTH] IN BLOOD BY AUTOMATED COUNT: 12.5 % (ref 10–15)
GFR SERPL CREATININE-BSD FRML MDRD: 54 ML/MIN/1.73M2
GLUCOSE SERPL-MCNC: 126 MG/DL (ref 70–99)
HCT VFR BLD AUTO: 43.8 % (ref 40–53)
HGB BLD-MCNC: 15 G/DL (ref 13.3–17.7)
HOLD SPECIMEN: NORMAL
IMM GRANULOCYTES # BLD: 0 10E3/UL
IMM GRANULOCYTES NFR BLD: 0 %
LYMPHOCYTES # BLD AUTO: 0.9 10E3/UL (ref 0.8–5.3)
LYMPHOCYTES NFR BLD AUTO: 14 %
MCH RBC QN AUTO: 29.4 PG (ref 26.5–33)
MCHC RBC AUTO-ENTMCNC: 34.2 G/DL (ref 31.5–36.5)
MCV RBC AUTO: 86 FL (ref 78–100)
MONOCYTES # BLD AUTO: 0.5 10E3/UL (ref 0–1.3)
MONOCYTES NFR BLD AUTO: 9 %
NEUTROPHILS # BLD AUTO: 4.7 10E3/UL (ref 1.6–8.3)
NEUTROPHILS NFR BLD AUTO: 75 %
NRBC # BLD AUTO: 0 10E3/UL
NRBC BLD AUTO-RTO: 0 /100
PLATELET # BLD AUTO: 149 10E3/UL (ref 150–450)
POTASSIUM SERPL-SCNC: 4.6 MMOL/L (ref 3.4–5.3)
RBC # BLD AUTO: 5.11 10E6/UL (ref 4.4–5.9)
SODIUM SERPL-SCNC: 139 MMOL/L (ref 136–145)
WBC # BLD AUTO: 6.3 10E3/UL (ref 4–11)

## 2022-12-22 PROCEDURE — 36415 COLL VENOUS BLD VENIPUNCTURE: CPT | Mod: ZL | Performed by: FAMILY MEDICINE

## 2022-12-22 PROCEDURE — G0463 HOSPITAL OUTPT CLINIC VISIT: HCPCS

## 2022-12-22 PROCEDURE — 99214 OFFICE O/P EST MOD 30 MIN: CPT | Performed by: FAMILY MEDICINE

## 2022-12-22 PROCEDURE — 85025 COMPLETE CBC W/AUTO DIFF WBC: CPT | Mod: ZL | Performed by: FAMILY MEDICINE

## 2022-12-22 PROCEDURE — 80048 BASIC METABOLIC PNL TOTAL CA: CPT | Mod: ZL | Performed by: FAMILY MEDICINE

## 2022-12-22 ASSESSMENT — PATIENT HEALTH QUESTIONNAIRE - PHQ9
10. IF YOU CHECKED OFF ANY PROBLEMS, HOW DIFFICULT HAVE THESE PROBLEMS MADE IT FOR YOU TO DO YOUR WORK, TAKE CARE OF THINGS AT HOME, OR GET ALONG WITH OTHER PEOPLE: SOMEWHAT DIFFICULT
SUM OF ALL RESPONSES TO PHQ QUESTIONS 1-9: 4
SUM OF ALL RESPONSES TO PHQ QUESTIONS 1-9: 4

## 2022-12-22 ASSESSMENT — PAIN SCALES - GENERAL: PAINLEVEL: MODERATE PAIN (5)

## 2022-12-22 NOTE — NURSING NOTE
"Chief Complaint   Patient presents with     Pre-Op Exam     Surgery date 01/13/20233       Initial /84   Pulse 88   Temp 98.1  F (36.7  C) (Temporal)   Resp 20   Ht 1.727 m (5' 8\")   Wt 91.3 kg (201 lb 3.2 oz)   SpO2 95%   BMI 30.59 kg/m   Estimated body mass index is 30.59 kg/m  as calculated from the following:    Height as of this encounter: 1.727 m (5' 8\").    Weight as of this encounter: 91.3 kg (201 lb 3.2 oz).  Medication Reconciliation: complete    FOOD SECURITY SCREENING QUESTIONS  Hunger Vital Signs:  Within the past 12 months we worried whether our food would run out before we got money to buy more. Never  Within the past 12 months the food we bought just didn't last and we didn't have money to get more. Never        Advance care directive on file? yes       Suri Mcduffie LPN  "

## 2022-12-22 NOTE — PROGRESS NOTES
[facility]  [address]  [place] [address]  Phone: [phone]  Fax: [phone]  Primary Provider: Maycol Díaz  Pre-op Performing Provider: MAYCOL DÍAZ    PREOPERATIVE EVALUATION:  Today's date: 12/22/2022    Ramo Lockwood is a 72 year old male who presents for a preoperative evaluation.    Surgical Information:  Surgery/Procedure: Bilateral Carpal tunnel  Surgery Location: [facility]  Surgeon: Dr Weiss  Surgery Date: 01/13/2023  Time of Surgery: TBD  Where patient plans to recover: At home with family  Fax number for surgical facility: Note does not need to be faxed, will be available electronically in Epic.        Type of Anesthesia Anticipated: to be determined    Ramo was seen today for pre-op exam.    Diagnoses and all orders for this visit:    Preop general physical exam  -     CBC with Platelets & Differential; Future  -     Basic metabolic panel; Future  -     CBC with Platelets & Differential  -     Basic metabolic panel    Stage 3a chronic kidney disease (H)  -     CBC with Platelets & Differential; Future  -     Basic metabolic panel; Future  -     CBC with Platelets & Differential  -     Basic metabolic panel    Polyneuropathy    Carpal tunnel syndrome, bilateral    Prediabetes    Liver replaced by transplant (H)    Other orders  -     Extra Tube; Future  -     Extra Tube       Okay to go ahead with planned procedure. Stop aspirin 1 week prior to surgery.  OK to take regular medications with a sip of water the morning of surgery.     Maycol Díaz MD     Subjective     HPI related to upcoming procedure: Bilateral Carpal tunnel with progressive symptoms right greater than left.  Confirmed by EMG.    Reoperative risk assessment consultation was requested by Dr. Weiss for this pleasant gentleman who is status post liver transplant for hepatocellular carcinoma who has done well.  He has type 2 diabetes and chronic kidney disease which has  been stable and well controlled.    Preop Questions 12/22/2022   1. Have you ever had a heart attack or stroke? No   2. Have you ever had surgery on your heart or blood vessels, such as a stent placement, a coronary artery bypass, or surgery on an artery in your head, neck, heart, or legs? No   3. Do you have chest pain with activity? No   4. Do you have a history of  heart failure? No   5. Do you currently have a cold, bronchitis or symptoms of other infection? No   6. Do you have a cough, shortness of breath, or wheezing? No   7. Do you or anyone in your family have previous history of blood clots? No   8. Do you or does anyone in your family have a serious bleeding problem such as prolonged bleeding following surgeries or cuts? No   9. Have you ever had problems with anemia or been told to take iron pills? No   10. Have you had any abnormal blood loss such as black, tarry or bloody stools? No   11. Have you ever had a blood transfusion? YES - 2019 when he had a liver transplant   11a. Have you ever had a transfusion reaction? No   12. Are you willing to have a blood transfusion if it is medically needed before, during, or after your surgery? Yes   13. Have you or any of your relatives ever had problems with anesthesia? No   14. Do you have sleep apnea, excessive snoring or daytime drowsiness? No   15. Do you have any artifical heart valves or other implanted medical devices like a pacemaker, defibrillator, or continuous glucose monitor? No   16. Do you have artificial joints? No   17. Are you allergic to latex? No       Health Care Directive:  Patient has a Health Care Directive on file      Preoperative Review of :   reviewed - controlled substances reflected in medication list.  PDMP Review       Value Time User    State PDMP site checked  Yes 12/22/2022  5:20 PM Jim Díaz MD               Review of Systems  Constitutional, neuro, ENT, endocrine, pulmonary, cardiac, gastrointestinal,  genitourinary, musculoskeletal, integument and psychiatric systems are negative, except as otherwise noted.    Patient Active Problem List    Diagnosis Date Noted     Prediabetes 04/22/2021     Priority: Medium     Liver replaced by transplant due to alcoholic cirrhosis and HCC 09/30/2020     Priority: Medium     CKD (chronic kidney disease) stage 3, GFR 30-59 ml/min 09/30/2020     Priority: Medium     Alcoholism (H), in remission 09/30/2020     Priority: Medium     Osteoporosis 07/09/2020     Priority: Medium     IFG (impaired fasting glucose) 05/11/2020     Priority: Medium     Hyperkalemia 06/26/2019     Priority: Medium     Personal history of cirrhosis 05/14/2019     Priority: Medium     History of hepatocellular carcinoma 05/14/2019     Priority: Medium     Acute renal failure (H) 05/05/2019     Priority: Medium     Rectal varices 10/30/2018     Priority: Medium     Portal hypertensive gastropathy (H) 10/30/2018     Priority: Medium     Hepatocellular carcinoma (H) 04/11/2018     Priority: Medium     Influenza A 01/15/2018     Priority: Medium     Esophageal varices in cirrhosis (H) 11/09/2015     Priority: Medium     Hyponatremia 06/05/2015     Priority: Medium     C. 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     Priority: Medium     Diarrhea 04/19/2015     Priority: Medium     Seizure (H) 04/19/2015     Priority: Medium     Nondependent alcohol abuse, continuous drinking behavior 05/18/2014     Priority: Medium     Overview:   IMO Update 10/11       GI bleed 05/18/2014     Priority: Medium     Alcoholic cirrhosis of liver (H) 05/18/2014     Priority: Medium     Ascites 05/18/2014     Priority: Medium     Chronic hyponatremia 05/18/2014     Priority: Medium     Thrombocytopenia (H) 05/18/2014     Priority: Medium     Elevated INR 05/18/2014     Priority: Medium     Anemia 02/17/2014     Priority: Medium     Acute maxillary sinusitis 12/26/2013      Priority: Medium     Epistaxis 2013     Priority: Medium     Hyperlipidemia 2011     Priority: Medium     Overview:   IMO Update 10/11       Type II diabetes mellitus (H) 2011     Priority: Medium     Overview:   IMO Update 10/11       Neoplasm of uncertain behavior of lip, oral cavity, and pharynx 2006     Priority: Medium     Gout 2006     Priority: Medium     Essential hypertension 2006     Priority: Medium     Degeneration of lumbar or lumbosacral intervertebral disc 2005     Priority: Medium     Overview:   Chronic back syndrome.  PT.  L-spine and T-spine x-rays. L-spine MRI 9/3/02. Lumbar degenerative disc disease. [facility].  IMO Update 10/11        Past Medical History:   Diagnosis Date     Acute conjunctivitis, unspecified 2000     Alcoholic cirrhosis (H)      Chronic pain     back pain     Diabetes mellitus (H)      Gout      Hepatocellular carcinoma (H)      Hyperlipidemia      Hypertension      Special screening for malignant neoplasms, colon 2009     Past Surgical History:   Procedure Laterality Date     COLONOSCOPY       HERNIA REPAIR, UMBILICAL  10/02/2015     PHACOEMULSIFICATION WITH STANDARD INTRAOCULAR LENS IMPLANT  6/10/2013    Procedure: PHACOEMULSIFICATION WITH STANDARD INTRAOCULAR LENS IMPLANT;  CATARACT EXTRACTION WITH INTRA OCLULAR LENS LEFT;  Surgeon: Benoit Philip MD;  Location: [facility] OR     PHACOEMULSIFICATION WITH STANDARD INTRAOCULAR LENS IMPLANT  2013    Procedure: PHACOEMULSIFICATION WITH STANDARD INTRAOCULAR LENS IMPLANT;  CATARACT EXTRACTION WITH INTRA OCULAR LENS RIGHT;  Surgeon: Benoit Philip MD;  Location: [facility] OR     TRANSPLANT LIVER RECIPIENT  DONOR  2019    [facility]     umbilical hernia repair with mesh  2019    [facility]     Current Outpatient Medications   Medication Sig Dispense Refill     allopurinol (ZYLOPRIM) 100 MG tablet Take 1 tablet (100 mg) by mouth daily 90 tablet 11     aspirin (ASA)  "81 MG chewable tablet Take 81 mg by mouth daily       atorvastatin (LIPITOR) 20 MG tablet Take 20 mg by mouth daily       gabapentin (NEURONTIN) 300 MG capsule TAKE 1 CAPSULE(300 MG) BY MOUTH TWICE DAILY 180 capsule 11     Magnesium Cl-Calcium Carbonate 71.5-119 MG TBEC Take 143 mg by mouth       tacrolimus (ASTAGRAF XL) 1 MG 24 hr capsule Take 1 mg by mouth 2 times daily        amLODIPine (NORVASC) 10 MG tablet Take 10 mg by mouth         Allergies   Allergen Reactions     Codeine Nausea     Codeine Camsylate         Social History     Tobacco Use     Smoking status: Never     Smokeless tobacco: Never   Substance Use Topics     Alcohol use: No     Comment: H/O abuse and treatment, sober for 2 yrs.      Family History   Problem Relation Age of Onset     Cardiovascular Father         MI     Bladder Cancer Father      History   Drug Use Unknown         Objective     /84   Pulse 88   Temp 98.1  F (36.7  C) (Temporal)   Resp 20   Ht 1.727 m (5' 8\")   Wt 91.3 kg (201 lb 3.2 oz)   SpO2 95%   BMI 30.59 kg/m      Physical Exam  General Appearance: Pleasant, alert, appropriate appearance for age. No acute distress  Head Exam: Normal. Normocephalic, atraumatic.  Eye Exam:  Normal external eyes, conjunctivae, lids, cornea. DIANE. EOMI  Ear Exam: Normal TM's bilaterally. Normal auditory canals and external ears. Non-tender.  Nose Exam: Normal external nose, mucus membranes, and septum.  OroPharynx Exam:  Dental hygiene adequate. Normal buccal mucosa. Normal pharynx.  Neck Exam:  Supple, no masses or nodes. No audible bruits  Thyroid Exam: No nodules or enlargement.  Chest/Respiratory Exam: Normal chest wall and respirations. Clear to auscultation.  Cardiovascular Exam: Regular rate and rhythm. S1, S2, no murmur, click, gallop, or rubs.  Gastrointestinal Exam: Soft, non-tender, no masses or organomegaly.  Lymphatic Exam: Non-palpable nodes in neck, clavicular regions.  Musculoskeletal Exam: Per Dr. Abhishek Pace " Exam: Left and right foot: good pedal pulses  Skin: no rash or abnormalities  Neurologic Exam: Nonfocal, normal gross motor, tone coordination and no tremor.  Psychiatric Exam: Alert and oriented - appropriate affect.     Recent Labs   Lab Test 09/29/22  1126 07/07/22  0847 10/05/21  1058 06/30/21  0900   HGB 14.2 14.2   < > 14.4   * 155   < > 128*    139   < > 140   POTASSIUM 4.9 4.3   < > 4.9   CR 1.31* 1.48*   < > 1.58*   A1C 5.8  --   --  6.3*    < > = values in this interval not displayed.        Diagnostics:  Recent Results (from the past 24 hour(s))   Basic metabolic panel    Collection Time: 12/22/22 11:23 AM   Result Value Ref Range    Sodium 139 136 - 145 mmol/L    Potassium 4.6 3.4 - 5.3 mmol/L    Chloride 105 98 - 107 mmol/L    Carbon Dioxide (CO2) 24 22 - 29 mmol/L    Anion Gap 10 7 - 15 mmol/L    Urea Nitrogen 19.7 8.0 - 23.0 mg/dL    Creatinine 1.38 (H) 0.67 - 1.17 mg/dL    Calcium 9.4 8.8 - 10.2 mg/dL    Glucose 126 (H) 70 - 99 mg/dL    GFR Estimate 54 (L) >60 mL/min/1.73m2   CBC with platelets and differential    Collection Time: 12/22/22 11:23 AM   Result Value Ref Range    WBC Count 6.3 4.0 - 11.0 10e3/uL    RBC Count 5.11 4.40 - 5.90 10e6/uL    Hemoglobin 15.0 13.3 - 17.7 g/dL    Hematocrit 43.8 40.0 - 53.0 %    MCV 86 78 - 100 fL    MCH 29.4 26.5 - 33.0 pg    MCHC 34.2 31.5 - 36.5 g/dL    RDW 12.5 10.0 - 15.0 %    Platelet Count 149 (L) 150 - 450 10e3/uL    % Neutrophils 75 %    % Lymphocytes 14 %    % Monocytes 9 %    % Eosinophils 2 %    % Basophils 0 %    % Immature Granulocytes 0 %    NRBCs per 100 WBC 0 <1 /100    Absolute Neutrophils 4.7 1.6 - 8.3 10e3/uL    Absolute Lymphocytes 0.9 0.8 - 5.3 10e3/uL    Absolute Monocytes 0.5 0.0 - 1.3 10e3/uL    Absolute Eosinophils 0.1 0.0 - 0.7 10e3/uL    Absolute Basophils 0.0 0.0 - 0.2 10e3/uL    Absolute Immature Granulocytes 0.0 <=0.4 10e3/uL    Absolute NRBCs 0.0 10e3/uL   Extra Serum Separator Tube (SST)    Collection Time: 12/22/22  11:23 AM   Result Value Ref Range    Hold Specimen JIC       No EKG required for low risk surgery (cataract, skin procedure, breast biopsy, etc).    Revised Cardiac Risk Index (RCRI):  The patient has the following serious cardiovascular risks for perioperative complications:   - No serious cardiac risks = 0 points     RCRI Interpretation: 0 points: Class I (very low risk - 0.4% complication rate)         Answers for HPI/ROS submitted by the patient on 12/22/2022  If you checked off any problems, how difficult have these problems made it for you to do your work, take care of things at home, or get along with other people?: Somewhat difficult  PHQ9 TOTAL SCORE: 4  Frequency of checking blood sugars:: not at all  Diabetic concerns:: none  Paraesthesia present:: none of these symptoms  Have you had a diabetic eye exam within the last year?: No  Signed Electronically by: Jim Díaz MD  Copy of this evaluation report is provided to requesting physician.

## 2022-12-22 NOTE — H&P (VIEW-ONLY)
[facility]  [address]  [place] [address]  Phone: [phone]  Fax: [phone]  Primary Provider: Maycol Díaz  Pre-op Performing Provider: MAYCOL DÍAZ    PREOPERATIVE EVALUATION:  Today's date: 12/22/2022    Ramo Lockwood is a 72 year old male who presents for a preoperative evaluation.    Surgical Information:  Surgery/Procedure: Bilateral Carpal tunnel  Surgery Location: [facility]  Surgeon: Dr Weiss  Surgery Date: 01/13/2023  Time of Surgery: TBD  Where patient plans to recover: At home with family  Fax number for surgical facility: Note does not need to be faxed, will be available electronically in Epic.        Type of Anesthesia Anticipated: to be determined    Ramo was seen today for pre-op exam.    Diagnoses and all orders for this visit:    Preop general physical exam  -     CBC with Platelets & Differential; Future  -     Basic metabolic panel; Future  -     CBC with Platelets & Differential  -     Basic metabolic panel    Stage 3a chronic kidney disease (H)  -     CBC with Platelets & Differential; Future  -     Basic metabolic panel; Future  -     CBC with Platelets & Differential  -     Basic metabolic panel    Polyneuropathy    Carpal tunnel syndrome, bilateral    Prediabetes    Liver replaced by transplant (H)    Other orders  -     Extra Tube; Future  -     Extra Tube       Okay to go ahead with planned procedure. Stop aspirin 1 week prior to surgery.  OK to take regular medications with a sip of water the morning of surgery.     Maycol Díaz MD     Subjective     HPI related to upcoming procedure: Bilateral Carpal tunnel with progressive symptoms right greater than left.  Confirmed by EMG.    Reoperative risk assessment consultation was requested by Dr. Weiss for this pleasant gentleman who is status post liver transplant for hepatocellular carcinoma who has done well.  He has type 2 diabetes and chronic kidney disease which has  been stable and well controlled.    Preop Questions 12/22/2022   1. Have you ever had a heart attack or stroke? No   2. Have you ever had surgery on your heart or blood vessels, such as a stent placement, a coronary artery bypass, or surgery on an artery in your head, neck, heart, or legs? No   3. Do you have chest pain with activity? No   4. Do you have a history of  heart failure? No   5. Do you currently have a cold, bronchitis or symptoms of other infection? No   6. Do you have a cough, shortness of breath, or wheezing? No   7. Do you or anyone in your family have previous history of blood clots? No   8. Do you or does anyone in your family have a serious bleeding problem such as prolonged bleeding following surgeries or cuts? No   9. Have you ever had problems with anemia or been told to take iron pills? No   10. Have you had any abnormal blood loss such as black, tarry or bloody stools? No   11. Have you ever had a blood transfusion? YES - 2019 when he had a liver transplant   11a. Have you ever had a transfusion reaction? No   12. Are you willing to have a blood transfusion if it is medically needed before, during, or after your surgery? Yes   13. Have you or any of your relatives ever had problems with anesthesia? No   14. Do you have sleep apnea, excessive snoring or daytime drowsiness? No   15. Do you have any artifical heart valves or other implanted medical devices like a pacemaker, defibrillator, or continuous glucose monitor? No   16. Do you have artificial joints? No   17. Are you allergic to latex? No       Health Care Directive:  Patient has a Health Care Directive on file      Preoperative Review of :   reviewed - controlled substances reflected in medication list.  PDMP Review       Value Time User    State PDMP site checked  Yes 12/22/2022  5:20 PM Jim Díaz MD               Review of Systems  Constitutional, neuro, ENT, endocrine, pulmonary, cardiac, gastrointestinal,  genitourinary, musculoskeletal, integument and psychiatric systems are negative, except as otherwise noted.    Patient Active Problem List    Diagnosis Date Noted     Prediabetes 04/22/2021     Priority: Medium     Liver replaced by transplant due to alcoholic cirrhosis and HCC 09/30/2020     Priority: Medium     CKD (chronic kidney disease) stage 3, GFR 30-59 ml/min 09/30/2020     Priority: Medium     Alcoholism (H), in remission 09/30/2020     Priority: Medium     Osteoporosis 07/09/2020     Priority: Medium     IFG (impaired fasting glucose) 05/11/2020     Priority: Medium     Hyperkalemia 06/26/2019     Priority: Medium     Personal history of cirrhosis 05/14/2019     Priority: Medium     History of hepatocellular carcinoma 05/14/2019     Priority: Medium     Acute renal failure (H) 05/05/2019     Priority: Medium     Rectal varices 10/30/2018     Priority: Medium     Portal hypertensive gastropathy (H) 10/30/2018     Priority: Medium     Hepatocellular carcinoma (H) 04/11/2018     Priority: Medium     Influenza A 01/15/2018     Priority: Medium     Esophageal varices in cirrhosis (H) 11/09/2015     Priority: Medium     Hyponatremia 06/05/2015     Priority: Medium     C. 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     Priority: Medium     Diarrhea 04/19/2015     Priority: Medium     Seizure (H) 04/19/2015     Priority: Medium     Nondependent alcohol abuse, continuous drinking behavior 05/18/2014     Priority: Medium     Overview:   IMO Update 10/11       GI bleed 05/18/2014     Priority: Medium     Alcoholic cirrhosis of liver (H) 05/18/2014     Priority: Medium     Ascites 05/18/2014     Priority: Medium     Chronic hyponatremia 05/18/2014     Priority: Medium     Thrombocytopenia (H) 05/18/2014     Priority: Medium     Elevated INR 05/18/2014     Priority: Medium     Anemia 02/17/2014     Priority: Medium     Acute maxillary sinusitis 12/26/2013      Priority: Medium     Epistaxis 2013     Priority: Medium     Hyperlipidemia 2011     Priority: Medium     Overview:   IMO Update 10/11       Type II diabetes mellitus (H) 2011     Priority: Medium     Overview:   IMO Update 10/11       Neoplasm of uncertain behavior of lip, oral cavity, and pharynx 2006     Priority: Medium     Gout 2006     Priority: Medium     Essential hypertension 2006     Priority: Medium     Degeneration of lumbar or lumbosacral intervertebral disc 2005     Priority: Medium     Overview:   Chronic back syndrome.  PT.  L-spine and T-spine x-rays. L-spine MRI 9/3/02. Lumbar degenerative disc disease. [facility].  IMO Update 10/11        Past Medical History:   Diagnosis Date     Acute conjunctivitis, unspecified 2000     Alcoholic cirrhosis (H)      Chronic pain     back pain     Diabetes mellitus (H)      Gout      Hepatocellular carcinoma (H)      Hyperlipidemia      Hypertension      Special screening for malignant neoplasms, colon 2009     Past Surgical History:   Procedure Laterality Date     COLONOSCOPY       HERNIA REPAIR, UMBILICAL  10/02/2015     PHACOEMULSIFICATION WITH STANDARD INTRAOCULAR LENS IMPLANT  6/10/2013    Procedure: PHACOEMULSIFICATION WITH STANDARD INTRAOCULAR LENS IMPLANT;  CATARACT EXTRACTION WITH INTRA OCLULAR LENS LEFT;  Surgeon: Benoit Philip MD;  Location: [facility] OR     PHACOEMULSIFICATION WITH STANDARD INTRAOCULAR LENS IMPLANT  2013    Procedure: PHACOEMULSIFICATION WITH STANDARD INTRAOCULAR LENS IMPLANT;  CATARACT EXTRACTION WITH INTRA OCULAR LENS RIGHT;  Surgeon: Benoit Philip MD;  Location: [facility] OR     TRANSPLANT LIVER RECIPIENT  DONOR  2019    [facility]     umbilical hernia repair with mesh  2019    [facility]     Current Outpatient Medications   Medication Sig Dispense Refill     allopurinol (ZYLOPRIM) 100 MG tablet Take 1 tablet (100 mg) by mouth daily 90 tablet 11     aspirin (ASA)  "81 MG chewable tablet Take 81 mg by mouth daily       atorvastatin (LIPITOR) 20 MG tablet Take 20 mg by mouth daily       gabapentin (NEURONTIN) 300 MG capsule TAKE 1 CAPSULE(300 MG) BY MOUTH TWICE DAILY 180 capsule 11     Magnesium Cl-Calcium Carbonate 71.5-119 MG TBEC Take 143 mg by mouth       tacrolimus (ASTAGRAF XL) 1 MG 24 hr capsule Take 1 mg by mouth 2 times daily        amLODIPine (NORVASC) 10 MG tablet Take 10 mg by mouth         Allergies   Allergen Reactions     Codeine Nausea     Codeine Camsylate         Social History     Tobacco Use     Smoking status: Never     Smokeless tobacco: Never   Substance Use Topics     Alcohol use: No     Comment: H/O abuse and treatment, sober for 2 yrs.      Family History   Problem Relation Age of Onset     Cardiovascular Father         MI     Bladder Cancer Father      History   Drug Use Unknown         Objective     /84   Pulse 88   Temp 98.1  F (36.7  C) (Temporal)   Resp 20   Ht 1.727 m (5' 8\")   Wt 91.3 kg (201 lb 3.2 oz)   SpO2 95%   BMI 30.59 kg/m      Physical Exam  General Appearance: Pleasant, alert, appropriate appearance for age. No acute distress  Head Exam: Normal. Normocephalic, atraumatic.  Eye Exam:  Normal external eyes, conjunctivae, lids, cornea. DIANE. EOMI  Ear Exam: Normal TM's bilaterally. Normal auditory canals and external ears. Non-tender.  Nose Exam: Normal external nose, mucus membranes, and septum.  OroPharynx Exam:  Dental hygiene adequate. Normal buccal mucosa. Normal pharynx.  Neck Exam:  Supple, no masses or nodes. No audible bruits  Thyroid Exam: No nodules or enlargement.  Chest/Respiratory Exam: Normal chest wall and respirations. Clear to auscultation.  Cardiovascular Exam: Regular rate and rhythm. S1, S2, no murmur, click, gallop, or rubs.  Gastrointestinal Exam: Soft, non-tender, no masses or organomegaly.  Lymphatic Exam: Non-palpable nodes in neck, clavicular regions.  Musculoskeletal Exam: Per Dr. Abhishek Pace " Exam: Left and right foot: good pedal pulses  Skin: no rash or abnormalities  Neurologic Exam: Nonfocal, normal gross motor, tone coordination and no tremor.  Psychiatric Exam: Alert and oriented - appropriate affect.     Recent Labs   Lab Test 09/29/22  1126 07/07/22  0847 10/05/21  1058 06/30/21  0900   HGB 14.2 14.2   < > 14.4   * 155   < > 128*    139   < > 140   POTASSIUM 4.9 4.3   < > 4.9   CR 1.31* 1.48*   < > 1.58*   A1C 5.8  --   --  6.3*    < > = values in this interval not displayed.        Diagnostics:  Recent Results (from the past 24 hour(s))   Basic metabolic panel    Collection Time: 12/22/22 11:23 AM   Result Value Ref Range    Sodium 139 136 - 145 mmol/L    Potassium 4.6 3.4 - 5.3 mmol/L    Chloride 105 98 - 107 mmol/L    Carbon Dioxide (CO2) 24 22 - 29 mmol/L    Anion Gap 10 7 - 15 mmol/L    Urea Nitrogen 19.7 8.0 - 23.0 mg/dL    Creatinine 1.38 (H) 0.67 - 1.17 mg/dL    Calcium 9.4 8.8 - 10.2 mg/dL    Glucose 126 (H) 70 - 99 mg/dL    GFR Estimate 54 (L) >60 mL/min/1.73m2   CBC with platelets and differential    Collection Time: 12/22/22 11:23 AM   Result Value Ref Range    WBC Count 6.3 4.0 - 11.0 10e3/uL    RBC Count 5.11 4.40 - 5.90 10e6/uL    Hemoglobin 15.0 13.3 - 17.7 g/dL    Hematocrit 43.8 40.0 - 53.0 %    MCV 86 78 - 100 fL    MCH 29.4 26.5 - 33.0 pg    MCHC 34.2 31.5 - 36.5 g/dL    RDW 12.5 10.0 - 15.0 %    Platelet Count 149 (L) 150 - 450 10e3/uL    % Neutrophils 75 %    % Lymphocytes 14 %    % Monocytes 9 %    % Eosinophils 2 %    % Basophils 0 %    % Immature Granulocytes 0 %    NRBCs per 100 WBC 0 <1 /100    Absolute Neutrophils 4.7 1.6 - 8.3 10e3/uL    Absolute Lymphocytes 0.9 0.8 - 5.3 10e3/uL    Absolute Monocytes 0.5 0.0 - 1.3 10e3/uL    Absolute Eosinophils 0.1 0.0 - 0.7 10e3/uL    Absolute Basophils 0.0 0.0 - 0.2 10e3/uL    Absolute Immature Granulocytes 0.0 <=0.4 10e3/uL    Absolute NRBCs 0.0 10e3/uL   Extra Serum Separator Tube (SST)    Collection Time: 12/22/22  11:23 AM   Result Value Ref Range    Hold Specimen JIC       No EKG required for low risk surgery (cataract, skin procedure, breast biopsy, etc).    Revised Cardiac Risk Index (RCRI):  The patient has the following serious cardiovascular risks for perioperative complications:   - No serious cardiac risks = 0 points     RCRI Interpretation: 0 points: Class I (very low risk - 0.4% complication rate)         Answers for HPI/ROS submitted by the patient on 12/22/2022  If you checked off any problems, how difficult have these problems made it for you to do your work, take care of things at home, or get along with other people?: Somewhat difficult  PHQ9 TOTAL SCORE: 4  Frequency of checking blood sugars:: not at all  Diabetic concerns:: none  Paraesthesia present:: none of these symptoms  Have you had a diabetic eye exam within the last year?: No  Signed Electronically by: Jim Díaz MD  Copy of this evaluation report is provided to requesting physician.

## 2023-01-03 ENCOUNTER — LAB (OUTPATIENT)
Dept: LAB | Facility: OTHER | Age: 73
End: 2023-01-03
Payer: MEDICARE

## 2023-01-03 DIAGNOSIS — Z94.4 LIVER REPLACED BY TRANSPLANT (H): ICD-10-CM

## 2023-01-03 LAB
BASOPHILS # BLD AUTO: 0 10E3/UL (ref 0–0.2)
BASOPHILS NFR BLD AUTO: 0 %
EOSINOPHIL # BLD AUTO: 0.2 10E3/UL (ref 0–0.7)
EOSINOPHIL NFR BLD AUTO: 3 %
ERYTHROCYTE [DISTWIDTH] IN BLOOD BY AUTOMATED COUNT: 13 % (ref 10–15)
HCT VFR BLD AUTO: 43.3 % (ref 40–53)
HGB BLD-MCNC: 14.8 G/DL (ref 13.3–17.7)
IMM GRANULOCYTES # BLD: 0 10E3/UL
IMM GRANULOCYTES NFR BLD: 0 %
LYMPHOCYTES # BLD AUTO: 1 10E3/UL (ref 0.8–5.3)
LYMPHOCYTES NFR BLD AUTO: 19 %
MCH RBC QN AUTO: 29.5 PG (ref 26.5–33)
MCHC RBC AUTO-ENTMCNC: 34.2 G/DL (ref 31.5–36.5)
MCV RBC AUTO: 86 FL (ref 78–100)
MONOCYTES # BLD AUTO: 0.6 10E3/UL (ref 0–1.3)
MONOCYTES NFR BLD AUTO: 11 %
NEUTROPHILS # BLD AUTO: 3.6 10E3/UL (ref 1.6–8.3)
NEUTROPHILS NFR BLD AUTO: 67 %
NRBC # BLD AUTO: 0 10E3/UL
NRBC BLD AUTO-RTO: 0 /100
PLATELET # BLD AUTO: 166 10E3/UL (ref 150–450)
RBC # BLD AUTO: 5.02 10E6/UL (ref 4.4–5.9)
WBC # BLD AUTO: 5.4 10E3/UL (ref 4–11)

## 2023-01-03 PROCEDURE — 36415 COLL VENOUS BLD VENIPUNCTURE: CPT | Mod: ZL

## 2023-01-03 PROCEDURE — 85025 COMPLETE CBC W/AUTO DIFF WBC: CPT | Mod: ZL

## 2023-01-12 ENCOUNTER — ANESTHESIA EVENT (OUTPATIENT)
Dept: SURGERY | Facility: OTHER | Age: 73
End: 2023-01-12
Payer: MEDICARE

## 2023-01-12 RX ORDER — HYDROMORPHONE HYDROCHLORIDE 1 MG/ML
0.4 INJECTION, SOLUTION INTRAMUSCULAR; INTRAVENOUS; SUBCUTANEOUS EVERY 5 MIN PRN
Status: CANCELLED | OUTPATIENT
Start: 2023-01-12

## 2023-01-12 RX ORDER — FENTANYL CITRATE 50 UG/ML
25 INJECTION, SOLUTION INTRAMUSCULAR; INTRAVENOUS EVERY 5 MIN PRN
Status: CANCELLED | OUTPATIENT
Start: 2023-01-12

## 2023-01-12 RX ORDER — FENTANYL CITRATE 50 UG/ML
50 INJECTION, SOLUTION INTRAMUSCULAR; INTRAVENOUS EVERY 5 MIN PRN
Status: CANCELLED | OUTPATIENT
Start: 2023-01-12

## 2023-01-12 RX ORDER — HYDROMORPHONE HYDROCHLORIDE 1 MG/ML
0.2 INJECTION, SOLUTION INTRAMUSCULAR; INTRAVENOUS; SUBCUTANEOUS EVERY 5 MIN PRN
Status: CANCELLED | OUTPATIENT
Start: 2023-01-12

## 2023-01-13 ENCOUNTER — ANESTHESIA (OUTPATIENT)
Dept: SURGERY | Facility: OTHER | Age: 73
End: 2023-01-13
Payer: MEDICARE

## 2023-01-13 ENCOUNTER — HOSPITAL ENCOUNTER (OUTPATIENT)
Facility: OTHER | Age: 73
Discharge: HOME | End: 2023-01-13
Attending: SPECIALIST | Admitting: SPECIALIST
Payer: MEDICARE

## 2023-01-13 VITALS
TEMPERATURE: 97.3 F | RESPIRATION RATE: 16 BRPM | BODY MASS INDEX: 30.46 KG/M2 | OXYGEN SATURATION: 94 % | DIASTOLIC BLOOD PRESSURE: 84 MMHG | HEART RATE: 75 BPM | WEIGHT: 201 LBS | HEIGHT: 68 IN | SYSTOLIC BLOOD PRESSURE: 130 MMHG

## 2023-01-13 PROCEDURE — 99100 ANES PT EXTEME AGE<1 YR&>70: CPT | Performed by: NURSE ANESTHETIST, CERTIFIED REGISTERED

## 2023-01-13 PROCEDURE — 999N000141 HC STATISTIC PRE-PROCEDURE NURSING ASSESSMENT: Performed by: SPECIALIST

## 2023-01-13 PROCEDURE — 250N000009 HC RX 250: Performed by: NURSE ANESTHETIST, CERTIFIED REGISTERED

## 2023-01-13 PROCEDURE — 370N000017 HC ANESTHESIA TECHNICAL FEE, PER MIN: Performed by: SPECIALIST

## 2023-01-13 PROCEDURE — 258N000003 HC RX IP 258 OP 636: Performed by: NURSE ANESTHETIST, CERTIFIED REGISTERED

## 2023-01-13 PROCEDURE — 710N000012 HC RECOVERY PHASE 2, PER MINUTE: Performed by: SPECIALIST

## 2023-01-13 PROCEDURE — 250N000009 HC RX 250: Performed by: SPECIALIST

## 2023-01-13 PROCEDURE — 360N000076 HC SURGERY LEVEL 3, PER MIN: Performed by: SPECIALIST

## 2023-01-13 PROCEDURE — 272N000001 HC OR GENERAL SUPPLY STERILE: Performed by: SPECIALIST

## 2023-01-13 PROCEDURE — 29848 WRIST ENDOSCOPY/SURGERY: CPT | Performed by: NURSE ANESTHETIST, CERTIFIED REGISTERED

## 2023-01-13 PROCEDURE — 250N000011 HC RX IP 250 OP 636: Performed by: SPECIALIST

## 2023-01-13 PROCEDURE — 29848 WRIST ENDOSCOPY/SURGERY: CPT | Mod: 50 | Performed by: SPECIALIST

## 2023-01-13 PROCEDURE — 250N000011 HC RX IP 250 OP 636: Performed by: NURSE ANESTHETIST, CERTIFIED REGISTERED

## 2023-01-13 RX ORDER — LIDOCAINE HYDROCHLORIDE 20 MG/ML
INJECTION, SOLUTION INFILTRATION; PERINEURAL PRN
Status: DISCONTINUED | OUTPATIENT
Start: 2023-01-13 | End: 2023-01-13

## 2023-01-13 RX ORDER — ONDANSETRON 2 MG/ML
INJECTION INTRAMUSCULAR; INTRAVENOUS PRN
Status: DISCONTINUED | OUTPATIENT
Start: 2023-01-13 | End: 2023-01-13

## 2023-01-13 RX ORDER — NALOXONE HYDROCHLORIDE 0.4 MG/ML
0.4 INJECTION, SOLUTION INTRAMUSCULAR; INTRAVENOUS; SUBCUTANEOUS
Status: DISCONTINUED | OUTPATIENT
Start: 2023-01-13 | End: 2023-01-13 | Stop reason: HOSPADM

## 2023-01-13 RX ORDER — MAGNESIUM HYDROXIDE 1200 MG/15ML
LIQUID ORAL PRN
Status: DISCONTINUED | OUTPATIENT
Start: 2023-01-13 | End: 2023-01-13 | Stop reason: HOSPADM

## 2023-01-13 RX ORDER — MEPERIDINE HYDROCHLORIDE 50 MG/ML
12.5 INJECTION INTRAMUSCULAR; INTRAVENOUS; SUBCUTANEOUS
Status: DISCONTINUED | OUTPATIENT
Start: 2023-01-13 | End: 2023-01-13 | Stop reason: HOSPADM

## 2023-01-13 RX ORDER — SODIUM CHLORIDE, SODIUM LACTATE, POTASSIUM CHLORIDE, CALCIUM CHLORIDE 600; 310; 30; 20 MG/100ML; MG/100ML; MG/100ML; MG/100ML
INJECTION, SOLUTION INTRAVENOUS CONTINUOUS
Status: DISCONTINUED | OUTPATIENT
Start: 2023-01-13 | End: 2023-01-13 | Stop reason: HOSPADM

## 2023-01-13 RX ORDER — FENTANYL CITRATE 50 UG/ML
INJECTION, SOLUTION INTRAMUSCULAR; INTRAVENOUS PRN
Status: DISCONTINUED | OUTPATIENT
Start: 2023-01-13 | End: 2023-01-13

## 2023-01-13 RX ORDER — NALOXONE HYDROCHLORIDE 0.4 MG/ML
0.2 INJECTION, SOLUTION INTRAMUSCULAR; INTRAVENOUS; SUBCUTANEOUS
Status: DISCONTINUED | OUTPATIENT
Start: 2023-01-13 | End: 2023-01-13 | Stop reason: HOSPADM

## 2023-01-13 RX ORDER — FENTANYL CITRATE 50 UG/ML
25 INJECTION, SOLUTION INTRAMUSCULAR; INTRAVENOUS
Status: DISCONTINUED | OUTPATIENT
Start: 2023-01-13 | End: 2023-01-13 | Stop reason: HOSPADM

## 2023-01-13 RX ORDER — ONDANSETRON 4 MG/1
4 TABLET, ORALLY DISINTEGRATING ORAL EVERY 30 MIN PRN
Status: DISCONTINUED | OUTPATIENT
Start: 2023-01-13 | End: 2023-01-13 | Stop reason: HOSPADM

## 2023-01-13 RX ORDER — ONDANSETRON 2 MG/ML
4 INJECTION INTRAMUSCULAR; INTRAVENOUS EVERY 30 MIN PRN
Status: DISCONTINUED | OUTPATIENT
Start: 2023-01-13 | End: 2023-01-13 | Stop reason: HOSPADM

## 2023-01-13 RX ORDER — PROPOFOL 10 MG/ML
INJECTION, EMULSION INTRAVENOUS CONTINUOUS PRN
Status: DISCONTINUED | OUTPATIENT
Start: 2023-01-13 | End: 2023-01-13

## 2023-01-13 RX ORDER — PROPOFOL 10 MG/ML
INJECTION, EMULSION INTRAVENOUS PRN
Status: DISCONTINUED | OUTPATIENT
Start: 2023-01-13 | End: 2023-01-13

## 2023-01-13 RX ORDER — LIDOCAINE 40 MG/G
CREAM TOPICAL
Status: DISCONTINUED | OUTPATIENT
Start: 2023-01-13 | End: 2023-01-13 | Stop reason: HOSPADM

## 2023-01-13 RX ADMIN — PROPOFOL 100 MCG/KG/MIN: 10 INJECTION, EMULSION INTRAVENOUS at 12:26

## 2023-01-13 RX ADMIN — FENTANYL CITRATE 50 MCG: 50 INJECTION, SOLUTION INTRAMUSCULAR; INTRAVENOUS at 12:36

## 2023-01-13 RX ADMIN — PROPOFOL 80 MG: 10 INJECTION, EMULSION INTRAVENOUS at 12:24

## 2023-01-13 RX ADMIN — ONDANSETRON HYDROCHLORIDE 4 MG: 2 SOLUTION INTRAMUSCULAR; INTRAVENOUS at 12:36

## 2023-01-13 RX ADMIN — SODIUM CHLORIDE, POTASSIUM CHLORIDE, SODIUM LACTATE AND CALCIUM CHLORIDE: 600; 310; 30; 20 INJECTION, SOLUTION INTRAVENOUS at 11:58

## 2023-01-13 RX ADMIN — LIDOCAINE HYDROCHLORIDE 40 MG: 20 INJECTION, SOLUTION INFILTRATION; PERINEURAL at 12:24

## 2023-01-13 ASSESSMENT — ACTIVITIES OF DAILY LIVING (ADL): ADLS_ACUITY_SCORE: 37

## 2023-01-13 NOTE — ANESTHESIA CARE TRANSFER NOTE
Patient: Ramo Lockwood    Procedure: Procedure(s):  RELEASE, CARPAL TUNNEL, BILATERAL, ENDOSCOPIC       Diagnosis: Bilateral carpal tunnel syndrome [G56.03]  Diagnosis Additional Information: No value filed.    Anesthesia Type:   MAC     Note:    Oropharynx: oropharynx clear of all foreign objects and spontaneously breathing  Level of Consciousness: awake  Oxygen Supplementation: room air    Independent Airway: airway patency satisfactory and stable  Dentition: dentition unchanged  Vital Signs Stable: post-procedure vital signs reviewed and stable  Report to RN Given: handoff report given  Patient transferred to: Phase II    Handoff Report: Identifed the Patient, Identified the Reponsible Provider, Reviewed the pertinent medical history, Discussed the surgical course, Reviewed Intra-OP anesthesia mangement and issues during anesthesia, Set expectations for post-procedure period and Allowed opportunity for questions and acknowledgement of understanding      Vitals:  Vitals Value Taken Time   /72 01/13/23 1300   Temp     Pulse 77 01/13/23 1300   Resp     SpO2 93 % 01/13/23 1308   Vitals shown include unvalidated device data.    Electronically Signed By: DU White CRNA  January 13, 2023  1:09 PM

## 2023-01-13 NOTE — BRIEF OP NOTE
[facility]    Brief Operative Note    Pre-operative diagnosis: Bilateral carpal tunnel syndrome [G56.03]  Post-operative diagnosis Same as pre-operative diagnosis    Procedure: Procedure(s):  RELEASE, CARPAL TUNNEL, BILATERAL, ENDOSCOPIC  Surgeon: Surgeon(s) and Role:     * Chad Weiss MD - Primary     * Gaston Mireles PA - Assisting  Anesthesia: MAC with Local   Estimated Blood Loss: None    Drains: None  Specimens: * No specimens in log *  Findings:   None.  Complications: None.  Implants: * No implants in log *

## 2023-01-13 NOTE — DISCHARGE INSTRUCTIONS
[facility]  Adult Discharge Orders & Instructions    ________________________________________________________________          For 12 hours after surgery  Get plenty of rest.  A responsible adult must stay with you for at least 12 hours after you leave the hospital.   You may feel lightheaded.  IF so, sit for a few minutes before standing.  Have someone help you get up.   You may have a slight fever. Call the doctor if your fever is over 101 F (38.3 C) (taken under the tongue) or lasts longer than 24 hours.  You may have a dry mouth, a sore throat, muscle aches or trouble sleeping.  These should go away after 24 hours.  Do not make important or legal decisions.  6.   Do not drive or use heavy equipment.  If you have weakness or tingling, don't drive or use heavy equipment until this feeling goes away.    To contact a doctor, call   [phone]_______________________

## 2023-01-13 NOTE — ANESTHESIA POSTPROCEDURE EVALUATION
Patient: Ramo Lockwood    Procedure: Procedure(s):  RELEASE, CARPAL TUNNEL, BILATERAL, ENDOSCOPIC       Anesthesia Type:  MAC    Note:  Disposition: Outpatient   Postop Pain Control:            Sign Out: Well controlled pain   PONV: No   Neuro/Psych:             Sign Out: Acceptable/Baseline neuro status   Airway/Respiratory:             Sign Out: Acceptable/Baseline resp. status   CV/Hemodynamics:             Sign Out: Acceptable CV status   Other NRE: NONE   DID A NON-ROUTINE EVENT OCCUR? No           Last vitals:  Vitals Value Taken Time   /84 01/13/23 1345   Temp 97.3  F (36.3  C) 01/13/23 1300   Pulse 75 01/13/23 1345   Resp 16 01/13/23 1300   SpO2 96 % 01/13/23 1347   Vitals shown include unvalidated device data.    Electronically Signed By: David Kellerman, APRN CRNA  January 13, 2023  2:25 PM

## 2023-01-13 NOTE — OR NURSING
patient has been discharged to home at wheelchair via 1405 accompanied by neighbor Kera    Written discharge instructions were provided to patient.  Prescriptions were none.      Patient and adult caring for them verbalize understanding of discharge instructions including no driving until tomorrow and no longer taking narcotic pain medications - no operating mechanical equipment and no making any important decisions.They understand reason for discharge, and necessary follow-up appointments.

## 2023-01-13 NOTE — OP NOTE
Procedure Date: 01/13/2023    PREOPERATIVE DIAGNOSIS:  Bilateral carpal tunnel syndrome.    POSTOPERATIVE DIAGNOSIS:  Bilateral carpal tunnel syndrome.    PROCEDURE PERFORMED:  Bilateral endoscopic carpal tunnel release.    SURGEON:  Chad Weiss MD    ASSISTANT:  JAGUAR Jacobson    ANESTHESIA:  Local with IV sedation.    INDICATIONS FOR PROCEDURE:  This is a 72-year-old male with bilateral carpal tunnel syndrome.  He was offered endoscopic carpal tunnel release and elected to proceed.    DESCRIPTION OF PROCEDURE:  Following medical clearance, the patient was brought to the operating room and placed on the operating table.  Pneumatic tourniquet was placed about both upper extremities and both upper extremities were prepped and draped in the normal sterile manner.  Attention was directed to the right wrist, which was elevated, exsanguinated and the tourniquet was inflated to 250 mmHg. A sterile marking pen was used to rolando the proposed skin incisions on the hamate, which was outlined as was the fourth ray aiming line and a transverse incision was planned at the wrist. Local anesthetic without epinephrine was instilled into the planned incision site.  The incision was made, carried sharply down through skin and subcutaneous tissue.  Careful spreading was performed.  Antebrachial fascia was identified and a distally based U-shaped flap was elevated.  Spatula was placed beneath the transverse carpal ligament, followed by sequential dilators.  The endoscope was introduced to the wrist.  The distal aspect of transverse carpal ligament was well visualized.  Palmar pressure was applied, blade was elevated and the transverse carpal ligament released.  Following this, through an endoscopic portal, both sides of the transverse carpal ligament were identified.  The endoscope was then withdrawn.  The antebrachial fascia was released proximally using tenotomy scissors.  Tourniquet was deflated.  Circulation returned  promptly to the hand and fingers.  Hemostasis with direct pressure.  Skin was closed with Monocryl suture.  Sterile dressing was applied.    Attention was directed to the left upper extremity where an identical procedure was performed.  The patient was brought to the recovery room in stable condition.    Chad Weiss MD        D: 2023   T: 2023   MT: [id number]    Name:     APOLINAR GOMEZ  MRN:      [id number]        Account:        [id number]   :      1950           Procedure Date: 2023     Document: [id number]

## 2023-01-13 NOTE — INTERVAL H&P NOTE
".hpi  Clinical Conditions Present on Arrival:  Clinically Significant Risk Factors Present on Admission                    # Obesity: Estimated body mass index is 30.56 kg/m  as calculated from the following:    Height as of this encounter: 1.727 m (5' 8\").    Weight as of this encounter: 91.2 kg (201 lb).       "

## 2023-01-13 NOTE — ANESTHESIA PREPROCEDURE EVALUATION
Anesthesia Pre-Procedure Evaluation    Patient: Ramo Lockwood   MRN: [id number] : 1950        Procedure : Procedure(s):  RELEASE, CARPAL TUNNEL, BILATERAL, ENDOSCOPIC          Past Medical History:   Diagnosis Date     Acute conjunctivitis, unspecified 2000     Alcoholic cirrhosis (H)      Chronic pain     back pain     Diabetes mellitus (H)      Gout      Hepatocellular carcinoma (H)      Hyperlipidemia      Hypertension      Special screening for malignant neoplasms, colon 2009      Past Surgical History:   Procedure Laterality Date     COLONOSCOPY       HERNIA REPAIR, UMBILICAL  10/02/2015     PHACOEMULSIFICATION WITH STANDARD INTRAOCULAR LENS IMPLANT  6/10/2013    Procedure: PHACOEMULSIFICATION WITH STANDARD INTRAOCULAR LENS IMPLANT;  CATARACT EXTRACTION WITH INTRA OCLULAR LENS LEFT;  Surgeon: Benoit Philip MD;  Location: [facility] OR     PHACOEMULSIFICATION WITH STANDARD INTRAOCULAR LENS IMPLANT  2013    Procedure: PHACOEMULSIFICATION WITH STANDARD INTRAOCULAR LENS IMPLANT;  CATARACT EXTRACTION WITH INTRA OCULAR LENS RIGHT;  Surgeon: Benoit Philip MD;  Location: [facility] OR     TRANSPLANT LIVER RECIPIENT  DONOR  2019    [facility]     umbilical hernia repair with mesh  2019    [facility]      Allergies   Allergen Reactions     Codeine Nausea     Codeine Camsylate       Social History     Tobacco Use     Smoking status: Never     Smokeless tobacco: Never   Substance Use Topics     Alcohol use: No     Comment: H/O abuse and treatment, sober for 2 yrs.       Wt Readings from Last 1 Encounters:   23 91.2 kg (201 lb)        Anesthesia Evaluation   Pt has had prior anesthetic.         ROS/MED HX  ENT/Pulmonary:  - neg pulmonary ROS     Neurologic:  - neg neurologic ROS     Cardiovascular:     (+) hypertension-----    METS/Exercise Tolerance: 3 - Able to walk 1-2 blocks without stopping    Hematologic:     (+) anemia,     Musculoskeletal: Comment: Gout  (+) arthritis,      GI/Hepatic: Comment: H/O Liver transplant due to Cirrhosis     (+) liver disease,     Renal/Genitourinary:     (+) renal disease,     Endo:     (+) type I DM,     Psychiatric/Substance Use:     (+) alcohol abuse     Infectious Disease:  - neg infectious disease ROS     Malignancy:  - neg malignancy ROS     Other:      (+) , H/O Chronic Pain,        Physical Exam    Airway        Mallampati: II   TM distance: > 3 FB   Neck ROM: full   Mouth opening: > 3 cm    Respiratory Devices and Support         Dental     Comment: Upper Implants     (+) implants      Cardiovascular   cardiovascular exam normal       Rhythm and rate: regular and normal     Pulmonary   pulmonary exam normal        breath sounds clear to auscultation           OUTSIDE LABS:  CBC:   Lab Results   Component Value Date    WBC 5.4 01/03/2023    WBC 6.3 12/22/2022    HGB 14.8 01/03/2023    HGB 15.0 12/22/2022    HCT 43.3 01/03/2023    HCT 43.8 12/22/2022     01/03/2023     (L) 12/22/2022     BMP:   Lab Results   Component Value Date     12/22/2022     09/29/2022    POTASSIUM 4.6 12/22/2022    POTASSIUM 4.9 09/29/2022    CHLORIDE 105 12/22/2022    CHLORIDE 107 09/29/2022    CO2 24 12/22/2022    CO2 24 09/29/2022    BUN 19.7 12/22/2022    BUN 17 09/29/2022    CR 1.38 (H) 12/22/2022    CR 1.31 (H) 09/29/2022     (H) 12/22/2022     (H) 09/29/2022     COAGS:   Lab Results   Component Value Date    PTT 41 (H) 01/22/2019    INR 1.13 05/24/2019     POC:   Lab Results   Component Value Date     (H) 05/19/2014     HEPATIC:   Lab Results   Component Value Date    ALBUMIN 4.2 09/29/2022    PROTTOTAL 6.8 09/29/2022    ALT 21 09/29/2022    AST 18 09/29/2022    ALKPHOS 73 09/29/2022    BILITOTAL 0.6 09/29/2022    ABIMAEL <10 (L) 05/24/2019     OTHER:   Lab Results   Component Value Date    LACT 0.8 05/24/2019    A1C 5.8 09/29/2022    NESSA 9.4 12/22/2022    MAG 1.6 05/24/2019    LIPASE 94 05/24/2019    TSH 1.60 09/29/2022     CRP 16.4 (H) 09/22/2021       Anesthesia Plan    ASA Status:  3   NPO Status:  NPO Appropriate    Anesthesia Type: MAC.              Consents    Anesthesia Plan(s) and associated risks, benefits, and realistic alternatives discussed. Questions answered and patient/representative(s) expressed understanding.     - Discussed: Risks, Benefits and Alternatives for the PROCEDURE were discussed     - Discussed with:  Patient         Postoperative Care            Comments:                David Kellerman, APRN CRNA

## 2023-01-20 ENCOUNTER — OFFICE VISIT (OUTPATIENT)
Dept: ORTHOPEDICS | Facility: OTHER | Age: 73
End: 2023-01-20
Attending: FAMILY MEDICINE
Payer: MEDICARE

## 2023-01-20 DIAGNOSIS — G56.03 CARPAL TUNNEL SYNDROME, BILATERAL: Primary | ICD-10-CM

## 2023-01-20 PROCEDURE — G0463 HOSPITAL OUTPT CLINIC VISIT: HCPCS

## 2023-01-20 PROCEDURE — 99024 POSTOP FOLLOW-UP VISIT: CPT | Performed by: SPECIALIST

## 2023-01-20 NOTE — PROGRESS NOTES
Visit Date: 2023    HISTORY OF PRESENT ILLNESS:  Apolinar returns for followup status post bilateral endoscopic carpal tunnel release.  His surgery was done one week ago on 2023.  He is back today.  His numbness has improved significantly.  His range of motion is full.    IMPRESSION AND PLAN:  One week status post bilateral endoscopic carpal tunnel release, doing well.    PLAN:  Plan will be to advance his activities and we will see him back if symptoms warrant on an as needed basis.    Chad Weiss MD        D: 2023   T: 2023   MT: HEMANTH    Name:     APOLINAR GOMEZ  MRN:      [id number]        Account:    [id number]   :      1950           Visit Date: 2023     Document: [id number]

## 2023-01-20 NOTE — PROGRESS NOTES
Patient is here for follow up on his carpal tunnel releases.  Gaby Rain LPN .....................1/20/2023 9:26 AM

## 2023-04-10 ENCOUNTER — TRANSFERRED RECORDS (OUTPATIENT)
Dept: MULTI SPECIALTY CLINIC | Facility: CLINIC | Age: 73
End: 2023-04-10

## 2023-04-10 LAB
ALBUMIN (URINE) MG/SPEC: 11.7 MG/L
ALBUMIN/CREATININE RATIO: 10 MG/G
ALT SERPL-CCNC: 30 U/L (ref 7–55)
AST SERPL-CCNC: 22 U/L (ref 8–48)
CHOLESTEROL (EXTERNAL): 148 MG/DL
CREATININE (EXTERNAL): 1.7 MG/DL (ref 0.74–1.35)
CREATININE (URINE): 114 MG/DL
GFR ESTIMATED (EXTERNAL): 42 ML/MIN/1.73M2
GLUCOSE (EXTERNAL): 121 MG/DL (ref 70–140)
HBA1C MFR BLD: 5.3 % (ref 4–5.6)
HDLC SERPL-MCNC: 42 MG/DL
INR (EXTERNAL): 1 (ref 0.9–1.1)
LDL CHOLESTEROL CALCULATED (EXTERNAL): 76 MG/DL
NON HDL CHOLESTEROL (EXTERNAL): 106 MG/DL
POTASSIUM (EXTERNAL): 4.8 MMOL/L (ref 3.6–5.2)
TRIGLYCERIDES (EXTERNAL): 176 MG/DL
TSH SERPL-ACNC: 4.6 MIU/L (ref 0.3–4.2)

## 2023-04-29 ENCOUNTER — HEALTH MAINTENANCE LETTER (OUTPATIENT)
Age: 73
End: 2023-04-29

## 2023-09-07 ENCOUNTER — LAB (OUTPATIENT)
Dept: LAB | Facility: OTHER | Age: 73
End: 2023-09-07
Attending: PHYSICIAN ASSISTANT
Payer: MEDICARE

## 2023-09-07 DIAGNOSIS — Z94.4 TRANSPLANTED LIVER (H): ICD-10-CM

## 2023-09-07 DIAGNOSIS — Z79.899 ENCOUNTER FOR LONG-TERM (CURRENT) USE OF MEDICATIONS: ICD-10-CM

## 2023-09-07 LAB
ALBUMIN SERPL BCG-MCNC: 4.2 G/DL (ref 3.5–5.2)
ALP SERPL-CCNC: 122 U/L (ref 40–129)
ALT SERPL W P-5'-P-CCNC: 38 U/L (ref 0–70)
ANION GAP SERPL CALCULATED.3IONS-SCNC: 12 MMOL/L (ref 7–15)
AST SERPL W P-5'-P-CCNC: 44 U/L (ref 0–45)
BASOPHILS # BLD AUTO: 0 10E3/UL (ref 0–0.2)
BASOPHILS NFR BLD AUTO: 0 %
BILIRUB SERPL-MCNC: 0.8 MG/DL
BUN SERPL-MCNC: 16.8 MG/DL (ref 8–23)
CALCIUM SERPL-MCNC: 8.5 MG/DL (ref 8.8–10.2)
CHLORIDE SERPL-SCNC: 103 MMOL/L (ref 98–107)
CREAT SERPL-MCNC: 1.32 MG/DL (ref 0.67–1.17)
DEPRECATED HCO3 PLAS-SCNC: 23 MMOL/L (ref 22–29)
EGFRCR SERPLBLD CKD-EPI 2021: 56 ML/MIN/1.73M2
EOSINOPHIL # BLD AUTO: 0.1 10E3/UL (ref 0–0.7)
EOSINOPHIL NFR BLD AUTO: 2 %
ERYTHROCYTE [DISTWIDTH] IN BLOOD BY AUTOMATED COUNT: 13.2 % (ref 10–15)
FASTING STATUS PATIENT QL REPORTED: YES
GLUCOSE SERPL-MCNC: 140 MG/DL (ref 70–99)
GLUCOSE SERPL-MCNC: 140 MG/DL (ref 70–99)
HCT VFR BLD AUTO: 41 % (ref 40–53)
HGB BLD-MCNC: 14.7 G/DL (ref 13.3–17.7)
IMM GRANULOCYTES # BLD: 0 10E3/UL
IMM GRANULOCYTES NFR BLD: 0 %
LYMPHOCYTES # BLD AUTO: 1.2 10E3/UL (ref 0.8–5.3)
LYMPHOCYTES NFR BLD AUTO: 17 %
MCH RBC QN AUTO: 29.9 PG (ref 26.5–33)
MCHC RBC AUTO-ENTMCNC: 35.9 G/DL (ref 31.5–36.5)
MCV RBC AUTO: 84 FL (ref 78–100)
MONOCYTES # BLD AUTO: 0.6 10E3/UL (ref 0–1.3)
MONOCYTES NFR BLD AUTO: 8 %
NEUTROPHILS # BLD AUTO: 5.2 10E3/UL (ref 1.6–8.3)
NEUTROPHILS NFR BLD AUTO: 73 %
NRBC # BLD AUTO: 0 10E3/UL
NRBC BLD AUTO-RTO: 0 /100
PLATELET # BLD AUTO: 135 10E3/UL (ref 150–450)
POTASSIUM SERPL-SCNC: 3.7 MMOL/L (ref 3.4–5.3)
PROT SERPL-MCNC: 6.5 G/DL (ref 6.4–8.3)
RBC # BLD AUTO: 4.91 10E6/UL (ref 4.4–5.9)
SODIUM SERPL-SCNC: 138 MMOL/L (ref 136–145)
WBC # BLD AUTO: 7.1 10E3/UL (ref 4–11)

## 2023-09-07 PROCEDURE — 85025 COMPLETE CBC W/AUTO DIFF WBC: CPT | Mod: ZL

## 2023-09-07 PROCEDURE — 80197 ASSAY OF TACROLIMUS: CPT | Mod: ZL

## 2023-09-07 PROCEDURE — 36415 COLL VENOUS BLD VENIPUNCTURE: CPT | Mod: ZL

## 2023-09-07 PROCEDURE — 80053 COMPREHEN METABOLIC PANEL: CPT | Mod: ZL

## 2023-09-08 LAB
TACROLIMUS BLD-MCNC: 6.9 UG/L (ref 5–15)
TME LAST DOSE: NORMAL H
TME LAST DOSE: NORMAL H

## 2023-10-07 DIAGNOSIS — M10.071 ACUTE IDIOPATHIC GOUT OF RIGHT FOOT: ICD-10-CM

## 2023-10-07 DIAGNOSIS — M10.9 GOUT, UNSPECIFIED CAUSE, UNSPECIFIED CHRONICITY, UNSPECIFIED SITE: ICD-10-CM

## 2023-10-13 RX ORDER — ALLOPURINOL 100 MG/1
100 TABLET ORAL DAILY
Qty: 90 TABLET | Refills: 0 | Status: SHIPPED | OUTPATIENT
Start: 2023-10-13 | End: 2024-01-03

## 2023-10-13 NOTE — TELEPHONE ENCOUNTER
[facility] sent Rx request for the following:      Requested Prescriptions   Pending Prescriptions Disp Refills    allopurinol (ZYLOPRIM) 100 MG tablet [Pharmacy Med Name: ALLOPURINOL 100MG TABLETS] 90 tablet 11     Sig: TAKE 1 TABLET(100 MG) BY MOUTH DAILY       Gout Agents Protocol Failed - 10/7/2023  3:55 AM   Last Prescription Date:   9/29/22  Last Fill Qty/Refills:         90, R-11   Last Office Visit:              12/22/22   Future Office visit:           none     Routing to covering provider for refill consideration, as PCP/provider is out of clinic >48 hours or Pt is completely out of medication and provider is out of the clinic today.  Roopa Lopez RN on 10/13/2023 at 10:55 AM

## 2023-11-02 DIAGNOSIS — G62.9 POLYNEUROPATHY: ICD-10-CM

## 2023-11-07 RX ORDER — GABAPENTIN 300 MG/1
CAPSULE ORAL
Qty: 180 CAPSULE | Refills: 11 | Status: SHIPPED | OUTPATIENT
Start: 2023-11-07

## 2023-12-09 ENCOUNTER — HEALTH MAINTENANCE LETTER (OUTPATIENT)
Age: 73
End: 2023-12-09

## 2024-01-03 ENCOUNTER — OFFICE VISIT (OUTPATIENT)
Dept: FAMILY MEDICINE | Facility: OTHER | Age: 74
End: 2024-01-03
Attending: FAMILY MEDICINE
Payer: MEDICARE

## 2024-01-03 VITALS
HEART RATE: 92 BPM | RESPIRATION RATE: 16 BRPM | HEIGHT: 69 IN | BODY MASS INDEX: 29.03 KG/M2 | SYSTOLIC BLOOD PRESSURE: 122 MMHG | DIASTOLIC BLOOD PRESSURE: 84 MMHG | OXYGEN SATURATION: 97 % | TEMPERATURE: 97.2 F | WEIGHT: 196 LBS

## 2024-01-03 DIAGNOSIS — I10 ESSENTIAL HYPERTENSION: ICD-10-CM

## 2024-01-03 DIAGNOSIS — D69.6 THROMBOCYTOPENIA (H): ICD-10-CM

## 2024-01-03 DIAGNOSIS — E78.5 DYSLIPIDEMIA: ICD-10-CM

## 2024-01-03 DIAGNOSIS — Z85.05 HISTORY OF HEPATOCELLULAR CARCINOMA: ICD-10-CM

## 2024-01-03 DIAGNOSIS — Z94.4 LIVER REPLACED BY TRANSPLANT (H): ICD-10-CM

## 2024-01-03 DIAGNOSIS — M10.071 ACUTE IDIOPATHIC GOUT OF RIGHT FOOT: ICD-10-CM

## 2024-01-03 DIAGNOSIS — M10.9 GOUT, UNSPECIFIED CAUSE, UNSPECIFIED CHRONICITY, UNSPECIFIED SITE: ICD-10-CM

## 2024-01-03 DIAGNOSIS — N42.9 PROSTATE DISORDER: ICD-10-CM

## 2024-01-03 DIAGNOSIS — R73.03 PREDIABETES: ICD-10-CM

## 2024-01-03 DIAGNOSIS — L57.0 ACTINIC KERATOSIS: ICD-10-CM

## 2024-01-03 DIAGNOSIS — Z00.00 MEDICARE ANNUAL WELLNESS VISIT, SUBSEQUENT: Primary | ICD-10-CM

## 2024-01-03 DIAGNOSIS — N18.31 STAGE 3A CHRONIC KIDNEY DISEASE (H): ICD-10-CM

## 2024-01-03 DIAGNOSIS — Z01.89 LABORATORY PROCEDURE: ICD-10-CM

## 2024-01-03 DIAGNOSIS — G62.9 POLYNEUROPATHY: ICD-10-CM

## 2024-01-03 LAB
ALBUMIN SERPL BCG-MCNC: 4.4 G/DL (ref 3.5–5.2)
ALP SERPL-CCNC: 109 U/L (ref 40–150)
ALT SERPL W P-5'-P-CCNC: 34 U/L (ref 0–70)
ANION GAP SERPL CALCULATED.3IONS-SCNC: 11 MMOL/L (ref 7–15)
AST SERPL W P-5'-P-CCNC: 29 U/L (ref 0–45)
BASOPHILS # BLD AUTO: 0 10E3/UL (ref 0–0.2)
BASOPHILS NFR BLD AUTO: 0 %
BILIRUB SERPL-MCNC: 0.6 MG/DL
BUN SERPL-MCNC: 14.3 MG/DL (ref 8–23)
CALCIUM SERPL-MCNC: 9.1 MG/DL (ref 8.8–10.2)
CHLORIDE SERPL-SCNC: 103 MMOL/L (ref 98–107)
CHOLEST SERPL-MCNC: 128 MG/DL
CREAT SERPL-MCNC: 1.32 MG/DL (ref 0.67–1.17)
DEPRECATED HCO3 PLAS-SCNC: 23 MMOL/L (ref 22–29)
EGFRCR SERPLBLD CKD-EPI 2021: 56 ML/MIN/1.73M2
EOSINOPHIL # BLD AUTO: 0.1 10E3/UL (ref 0–0.7)
EOSINOPHIL NFR BLD AUTO: 2 %
ERYTHROCYTE [DISTWIDTH] IN BLOOD BY AUTOMATED COUNT: 13.2 % (ref 10–15)
FASTING STATUS PATIENT QL REPORTED: ABNORMAL
GLUCOSE SERPL-MCNC: 137 MG/DL (ref 70–99)
HBA1C MFR BLD: 5.3 % (ref 4–6.2)
HCT VFR BLD AUTO: 43.2 % (ref 40–53)
HDLC SERPL-MCNC: 49 MG/DL
HGB BLD-MCNC: 14.7 G/DL (ref 13.3–17.7)
IMM GRANULOCYTES # BLD: 0 10E3/UL
IMM GRANULOCYTES NFR BLD: 0 %
LDLC SERPL CALC-MCNC: 42 MG/DL
LYMPHOCYTES # BLD AUTO: 1 10E3/UL (ref 0.8–5.3)
LYMPHOCYTES NFR BLD AUTO: 17 %
MCH RBC QN AUTO: 29.3 PG (ref 26.5–33)
MCHC RBC AUTO-ENTMCNC: 34 G/DL (ref 31.5–36.5)
MCV RBC AUTO: 86 FL (ref 78–100)
MONOCYTES # BLD AUTO: 0.5 10E3/UL (ref 0–1.3)
MONOCYTES NFR BLD AUTO: 9 %
NEUTROPHILS # BLD AUTO: 4.4 10E3/UL (ref 1.6–8.3)
NEUTROPHILS NFR BLD AUTO: 72 %
NONHDLC SERPL-MCNC: 79 MG/DL
NRBC # BLD AUTO: 0 10E3/UL
NRBC BLD AUTO-RTO: 0 /100
PLATELET # BLD AUTO: 149 10E3/UL (ref 150–450)
POTASSIUM SERPL-SCNC: 4.2 MMOL/L (ref 3.4–5.3)
PROT SERPL-MCNC: 6.9 G/DL (ref 6.4–8.3)
PSA SERPL DL<=0.01 NG/ML-MCNC: 0.31 NG/ML (ref 0–6.5)
RBC # BLD AUTO: 5.01 10E6/UL (ref 4.4–5.9)
SODIUM SERPL-SCNC: 137 MMOL/L (ref 135–145)
TRIGL SERPL-MCNC: 183 MG/DL
TSH SERPL DL<=0.005 MIU/L-ACNC: 3.75 UIU/ML (ref 0.3–4.2)
URATE SERPL-MCNC: 6.3 MG/DL (ref 3.4–7)
WBC # BLD AUTO: 6.1 10E3/UL (ref 4–11)

## 2024-01-03 PROCEDURE — 80061 LIPID PANEL: CPT | Mod: ZL | Performed by: FAMILY MEDICINE

## 2024-01-03 PROCEDURE — 84550 ASSAY OF BLOOD/URIC ACID: CPT | Mod: ZL | Performed by: FAMILY MEDICINE

## 2024-01-03 PROCEDURE — 84443 ASSAY THYROID STIM HORMONE: CPT | Mod: ZL | Performed by: FAMILY MEDICINE

## 2024-01-03 PROCEDURE — 85004 AUTOMATED DIFF WBC COUNT: CPT | Mod: ZL | Performed by: FAMILY MEDICINE

## 2024-01-03 PROCEDURE — 99214 OFFICE O/P EST MOD 30 MIN: CPT | Mod: 25 | Performed by: FAMILY MEDICINE

## 2024-01-03 PROCEDURE — 80053 COMPREHEN METABOLIC PANEL: CPT | Mod: ZL | Performed by: FAMILY MEDICINE

## 2024-01-03 PROCEDURE — 17003 DESTRUCT PREMALG LES 2-14: CPT | Performed by: FAMILY MEDICINE

## 2024-01-03 PROCEDURE — G0463 HOSPITAL OUTPT CLINIC VISIT: HCPCS | Mod: 25

## 2024-01-03 PROCEDURE — 17000 DESTRUCT PREMALG LESION: CPT | Performed by: FAMILY MEDICINE

## 2024-01-03 PROCEDURE — 99000 SPECIMEN HANDLING OFFICE-LAB: CPT | Performed by: FAMILY MEDICINE

## 2024-01-03 PROCEDURE — 83036 HEMOGLOBIN GLYCOSYLATED A1C: CPT | Mod: ZL | Performed by: FAMILY MEDICINE

## 2024-01-03 PROCEDURE — 36415 COLL VENOUS BLD VENIPUNCTURE: CPT | Mod: ZL | Performed by: FAMILY MEDICINE

## 2024-01-03 PROCEDURE — G0463 HOSPITAL OUTPT CLINIC VISIT: HCPCS

## 2024-01-03 PROCEDURE — G0439 PPPS, SUBSEQ VISIT: HCPCS | Performed by: FAMILY MEDICINE

## 2024-01-03 PROCEDURE — 84153 ASSAY OF PSA TOTAL: CPT | Mod: ZL | Performed by: FAMILY MEDICINE

## 2024-01-03 RX ORDER — ATORVASTATIN CALCIUM 20 MG/1
20 TABLET, FILM COATED ORAL DAILY
Qty: 90 TABLET | Refills: 3 | Status: SHIPPED | OUTPATIENT
Start: 2024-01-03

## 2024-01-03 RX ORDER — CALCITRIOL 0.25 UG/1
0.25 CAPSULE, LIQUID FILLED ORAL
COMMUNITY
Start: 2023-07-07

## 2024-01-03 RX ORDER — ALLOPURINOL 100 MG/1
100 TABLET ORAL DAILY
Qty: 90 TABLET | Refills: 0 | Status: SHIPPED | OUTPATIENT
Start: 2024-01-03 | End: 2024-04-03

## 2024-01-03 ASSESSMENT — ENCOUNTER SYMPTOMS
COUGH: 0
ARTHRALGIAS: 1
DYSURIA: 0
CONSTIPATION: 0
HEMATOCHEZIA: 0
WEAKNESS: 0
EYE PAIN: 0
JOINT SWELLING: 0
FEVER: 0
MYALGIAS: 1
HEARTBURN: 0
SHORTNESS OF BREATH: 0
HEMATURIA: 0
FREQUENCY: 0
PARESTHESIAS: 0
NERVOUS/ANXIOUS: 0
PALPITATIONS: 0
DIARRHEA: 0
NAUSEA: 0
HEADACHES: 0
DIZZINESS: 1
SORE THROAT: 0
ABDOMINAL PAIN: 0
CHILLS: 0

## 2024-01-03 ASSESSMENT — PAIN SCALES - GENERAL: PAINLEVEL: SEVERE PAIN (6)

## 2024-01-03 ASSESSMENT — ACTIVITIES OF DAILY LIVING (ADL): CURRENT_FUNCTION: NO ASSISTANCE NEEDED

## 2024-01-03 NOTE — PROGRESS NOTES
"SUBJECTIVE:   Ramo is a 73 year old, presenting for the following:  Medicare Visit (annual)        1/3/2024     8:32 AM   Additional Questions   Roomed by Suri Mcduffie LPN       Are you in the first 12 months of your Medicare coverage?  No    Healthy Habits:     In general, how would you rate your overall health?  Good    Frequency of exercise:  None    Do you usually eat at least 4 servings of fruit and vegetables a day, include whole grains    & fiber and avoid regularly eating high fat or \"junk\" foods?  No    Taking medications regularly:  Yes    Medication side effects:  None    Ability to successfully perform activities of daily living:  No assistance needed    Home Safety:  No safety concerns identified    Hearing Impairment:  Difficulty following a conversation in a noisy restaurant or crowded room and difficulty understanding soft or whispered speech    In the past 6 months, have you been bothered by leaking of urine?  No    In general, how would you rate your overall mental or emotional health?  Good    Additional concerns today:  No      Today's PHQ-2 Score:       1/3/2024     8:25 AM   PHQ-2 ( 1999 [employer])   Q1: Little interest or pleasure in doing things 0   Q2: Feeling down, depressed or hopeless 0   PHQ-2 Score 0   Q1: Little interest or pleasure in doing things Not at all   Q2: Feeling down, depressed or hopeless Not at all   PHQ-2 Score 0           Have you ever done Advance Care Planning? (For example, a Health Directive, POLST, or a discussion with a medical provider or your loved ones about your wishes): Yes, advance care planning is on file.       Fall risk  Fallen 2 or more times in the past year?: No  Any fall with injury in the past year?: No    Cognitive Screening   1) Repeat 3 items (Leader, Season, Table)    2) Clock draw: NORMAL  3) 3 item recall: Recalls 2 objects   Results: NORMAL clock, 1-2 items recalled: COGNITIVE IMPAIRMENT LESS LIKELY    Mini-CogTM Copyright S Shaila. Licensed by " the author for use in [facility]; reprinted with permission (ashlee@[facility]). All rights reserved.      Do you have sleep apnea, excessive snoring or daytime drowsiness? : no    Reviewed and updated as needed this visit by clinical staff   Tobacco  Allergies  Meds  Problems  Med Hx  Surg Hx  Fam Hx          Reviewed and updated as needed this visit by Provider   Tobacco  Allergies  Meds  Problems  Med Hx  Surg Hx  Fam Hx         Social History     Tobacco Use    Smoking status: Never     Passive exposure: Past    Smokeless tobacco: Never   Substance Use Topics    Alcohol use: No     Comment: H/O abuse and treatment, sober for 2 yrs.              1/3/2024     8:25 AM   Alcohol Use   Prescreen: >3 drinks/day or >7 drinks/week? No     Do you have a current opioid prescription? No  Do you use any other controlled substances or medications that are not prescribed by a provider? None              Current providers sharing in care for this patient include:   Patient Care Team:  Jim Díaz MD as PCP - General (Family Medicine)  Jim Díaz MD as Assigned PCP  Tal Izquierdo MD as Assigned Neuroscience Provider  Chad Weiss MD as Assigned Musculoskeletal Provider  Jim Díaz MD as MD (Family Medicine)  Chad Weiss MD as MD (Orthopaedic Surgery)    The following health maintenance items are reviewed in Epic and correct as of today:  Health Maintenance   Topic Date Due    RSV VACCINE (Pregnancy & 60+) (1 - 1-dose 60+ series) Never done    HEPATITIS A IMMUNIZATION (3 of 3 - Hep A Twinrix risk 3-dose series) 03/21/2016    DTAP/TDAP/TD IMMUNIZATION (2 - Td or Tdap) 10/16/2022    COVID-19 Vaccine (7 - 2023-24 season) 01/03/2024    MICROALBUMIN  04/10/2024    A1C  07/03/2024    MEDICARE ANNUAL WELLNESS VISIT  01/03/2025    BMP  01/03/2025    LIPID  01/03/2025    FALL RISK ASSESSMENT  01/03/2025    HEMOGLOBIN  01/03/2025    ADVANCE CARE PLANNING  01/03/2029    COLORECTAL  "CANCER SCREENING  03/23/2032    PHQ-2 (once per calendar year)  Completed    INFLUENZA VACCINE  Completed    Pneumococcal Vaccine: 65+ Years  Completed    URINALYSIS  Completed    ZOSTER IMMUNIZATION  Completed    HEPATITIS B IMMUNIZATION  Completed    AORTIC ANEURYSM SCREENING (SYSTEM ASSIGNED)  Completed    HEPATITIS C SCREENING  Addressed    IPV IMMUNIZATION  Aged Out    HPV IMMUNIZATION  Aged Out    MENINGITIS IMMUNIZATION  Aged Out    RSV MONOCLONAL ANTIBODY  Aged Out    DIABETIC FOOT EXAM  Discontinued    EYE EXAM  Discontinued               Review of Systems   Constitutional:  Negative for chills and fever.   HENT:  Positive for hearing loss. Negative for congestion, ear pain and sore throat.    Eyes:  Negative for pain and visual disturbance.   Respiratory:  Negative for cough and shortness of breath.    Cardiovascular:  Negative for chest pain, palpitations and peripheral edema.   Gastrointestinal:  Negative for abdominal pain, constipation, diarrhea, heartburn, hematochezia and nausea.   Genitourinary:  Negative for dysuria, frequency, genital sores, hematuria, impotence, penile discharge and urgency.   Musculoskeletal:  Positive for arthralgias and myalgias. Negative for joint swelling.   Skin:  Negative for rash.   Neurological:  Positive for dizziness. Negative for weakness, headaches and paresthesias.   Psychiatric/Behavioral:  Negative for mood changes. The patient is not nervous/anxious.          OBJECTIVE:   /84   Pulse 92   Temp 97.2  F (36.2  C) (Temporal)   Resp 16   Ht 1.753 m (5' 9\")   Wt 88.9 kg (196 lb)   SpO2 97%   BMI 28.94 kg/m   Estimated body mass index is 28.94 kg/m  as calculated from the following:    Height as of this encounter: 1.753 m (5' 9\").    Weight as of this encounter: 88.9 kg (196 lb).  Physical Exam          ASSESSMENT / PLAN:   Ramo was seen today for medicare visit.    Diagnoses and all orders for this visit:    Medicare annual wellness visit, " "subsequent    Stage 3a chronic kidney disease (H)  -     CBC with Platelets & Differential; Future  -     Comprehensive metabolic panel; Future  -     Comprehensive metabolic panel  -     CBC with Platelets & Differential    Prediabetes  -     Hemoglobin A1c; Future  -     TSH with free T4 reflex; Future  -     TSH with free T4 reflex  -     Hemoglobin A1c    Gout, unspecified cause, unspecified chronicity, unspecified site  -     Uric acid; Future  -     allopurinol (ZYLOPRIM) 100 MG tablet; Take 1 tablet (100 mg) by mouth daily  -     Uric acid    Essential hypertension    Thrombocytopenia (H24)    Liver replaced by transplant (H)    History of hepatocellular carcinoma    Prostate disorder  -     PSA tumor marker; Future  -     PSA tumor marker    Polyneuropathy  -     TSH with free T4 reflex; Future  -     TSH with free T4 reflex    Dyslipidemia  -     Comprehensive metabolic panel; Future  -     Lipid Profile; Future  -     TSH with free T4 reflex; Future  -     atorvastatin (LIPITOR) 20 MG tablet; Take 1 tablet (20 mg) by mouth daily  -     TSH with free T4 reflex  -     Lipid Profile  -     Comprehensive metabolic panel    Acute idiopathic gout of right foot  -     allopurinol (ZYLOPRIM) 100 MG tablet; Take 1 tablet (100 mg) by mouth daily    Laboratory procedure  -     Specimen label; Future  -     Specimen label    Actinic keratosis  -     DESTRUCT PREMALIGNANT LESION, FIRST  -     DESTRUCT PREMALIGNANT LESION, 2-14        Patient has been advised of split billing requirements and indicates understanding: Yes      COUNSELING:  Reviewed preventive health counseling, as reflected in patient instructions       Regular exercise       Healthy diet/nutrition       Colon cancer screening       Prostate cancer screening      BMI:   Estimated body mass index is 28.94 kg/m  as calculated from the following:    Height as of this encounter: 1.753 m (5' 9\").    Weight as of this encounter: 88.9 kg (196 lb).         He " reports that he has never smoked. He has been exposed to tobacco smoke. He has never used smokeless tobacco.      Appropriate preventive services were discussed with this patient, including applicable screening as appropriate for fall prevention, nutrition, physical activity, Tobacco-use cessation, weight loss and cognition.  Checklist reviewing preventive services available has been given to the patient.    Reviewed patients plan of care and provided an AVS. The Basic Care Plan (routine screening as documented in Health Maintenance) for Ramo meets the Care Plan requirement. This Care Plan has been established and reviewed with the Patient.          Jim Díaz MD  [facility]    Identified Health Risks:  I have reviewed Opioid Use Disorder and Substance Use Disorder risk factors and made any needed referrals.

## 2024-01-03 NOTE — NURSING NOTE
"Chief Complaint   Patient presents with    Medicare Visit     annual       Initial /84   Pulse 92   Temp 97.2  F (36.2  C) (Temporal)   Resp 16   Ht 1.753 m (5' 9\")   Wt 88.9 kg (196 lb)   SpO2 97%   BMI 28.94 kg/m   Estimated body mass index is 28.94 kg/m  as calculated from the following:    Height as of this encounter: 1.753 m (5' 9\").    Weight as of this encounter: 88.9 kg (196 lb).  Medication Review: complete    The next two questions are to help us understand your food security.  If you are feeling you need any assistance in this area, we have resources available to support you today.          1/3/2024   SDOH- Food Insecurity   Within the past 12 months, did you worry that your food would run out before you got money to buy more? N   Within the past 12 months, did the food you bought just not last and you didn t have money to get more? N         Health Care Directive:  Patient has a Health Care Directive on file      Suri Mcduffie LPN      "

## 2024-03-25 ENCOUNTER — LAB (OUTPATIENT)
Dept: LAB | Facility: OTHER | Age: 74
End: 2024-03-25
Payer: MEDICARE

## 2024-03-25 DIAGNOSIS — Z00.00 ROUTINE GENERAL MEDICAL EXAMINATION AT A HEALTH CARE FACILITY: Primary | ICD-10-CM

## 2024-03-25 PROCEDURE — 99000 SPECIMEN HANDLING OFFICE-LAB: CPT

## 2024-03-25 PROCEDURE — 36415 COLL VENOUS BLD VENIPUNCTURE: CPT | Mod: ZL

## 2024-03-30 DIAGNOSIS — M10.071 ACUTE IDIOPATHIC GOUT OF RIGHT FOOT: ICD-10-CM

## 2024-03-30 DIAGNOSIS — M10.9 GOUT, UNSPECIFIED CAUSE, UNSPECIFIED CHRONICITY, UNSPECIFIED SITE: ICD-10-CM

## 2024-04-03 RX ORDER — ALLOPURINOL 100 MG/1
100 TABLET ORAL DAILY
Qty: 90 TABLET | Refills: 2 | Status: SHIPPED | OUTPATIENT
Start: 2024-04-03

## 2024-04-03 NOTE — TELEPHONE ENCOUNTER
[facility] sent Rx request for the following:      Requested Prescriptions   Pending Prescriptions Disp Refills    allopurinol (ZYLOPRIM) 100 MG tablet [Pharmacy Med Name: ALLOPURINOL 100MG TABLETS] 90 tablet 0     Sig: TAKE 1 TABLET(100 MG) BY MOUTH DAILY       Gout Agents Protocol Failed - 4/3/2024  9:24 AM        Failed - Has Uric Acid on file in past 12 months and value is less than 6     Recent Labs   Lab Test 01/03/24  0851   URIC 6.3     If level is 6mg/dL or greater, ok to refill one time and refer to provider.         Failed - Has GFR on file in past 12 months and most recent value is normal        Failed - Normal serum creatinine on file in the past 12 months     Recent Labs   Lab Test 01/03/24  0851   CR 1.32*        Last Prescription Date:   1/3/24  Last Fill Qty/Refills:         90, R-0    Last Office Visit:              1/3/24   Future Office visit:           None    Unable to complete prescription refill per RN Medication Refill Policy.     Santa Crowley RN .............. 4/3/2024  10:10 AM

## 2024-07-03 ENCOUNTER — LAB (OUTPATIENT)
Dept: LAB | Facility: OTHER | Age: 74
End: 2024-07-03
Attending: NURSE PRACTITIONER
Payer: MEDICARE

## 2024-07-03 DIAGNOSIS — Z79.899 NEED FOR PROPHYLACTIC CHEMOTHERAPY: ICD-10-CM

## 2024-07-03 DIAGNOSIS — Z94.4 TRANSPLANTED LIVER (H): ICD-10-CM

## 2024-07-03 LAB
ALBUMIN SERPL BCG-MCNC: 4.3 G/DL (ref 3.5–5.2)
ALP SERPL-CCNC: 118 U/L (ref 40–150)
ALT SERPL W P-5'-P-CCNC: 35 U/L (ref 0–70)
ANION GAP SERPL CALCULATED.3IONS-SCNC: 11 MMOL/L (ref 7–15)
AST SERPL W P-5'-P-CCNC: 32 U/L (ref 0–45)
BASOPHILS # BLD AUTO: 0 10E3/UL (ref 0–0.2)
BASOPHILS NFR BLD AUTO: 0 %
BILIRUB DIRECT SERPL-MCNC: <0.2 MG/DL (ref 0–0.3)
BILIRUB SERPL-MCNC: 0.7 MG/DL
BUN SERPL-MCNC: 12.5 MG/DL (ref 8–23)
CALCIUM SERPL-MCNC: 9.1 MG/DL (ref 8.8–10.2)
CHLORIDE SERPL-SCNC: 103 MMOL/L (ref 98–107)
CREAT SERPL-MCNC: 1.18 MG/DL (ref 0.67–1.17)
DEPRECATED HCO3 PLAS-SCNC: 24 MMOL/L (ref 22–29)
EGFRCR SERPLBLD CKD-EPI 2021: 64 ML/MIN/1.73M2
EOSINOPHIL # BLD AUTO: 0.1 10E3/UL (ref 0–0.7)
EOSINOPHIL NFR BLD AUTO: 2 %
ERYTHROCYTE [DISTWIDTH] IN BLOOD BY AUTOMATED COUNT: 13.2 % (ref 10–15)
GLUCOSE SERPL-MCNC: 140 MG/DL (ref 70–99)
HCT VFR BLD AUTO: 42.4 % (ref 40–53)
HGB BLD-MCNC: 14.8 G/DL (ref 13.3–17.7)
IMM GRANULOCYTES # BLD: 0 10E3/UL
IMM GRANULOCYTES NFR BLD: 0 %
LYMPHOCYTES # BLD AUTO: 1 10E3/UL (ref 0.8–5.3)
LYMPHOCYTES NFR BLD AUTO: 17 %
MCH RBC QN AUTO: 30.5 PG (ref 26.5–33)
MCHC RBC AUTO-ENTMCNC: 34.9 G/DL (ref 31.5–36.5)
MCV RBC AUTO: 87 FL (ref 78–100)
MONOCYTES # BLD AUTO: 0.6 10E3/UL (ref 0–1.3)
MONOCYTES NFR BLD AUTO: 9 %
NEUTROPHILS # BLD AUTO: 4.3 10E3/UL (ref 1.6–8.3)
NEUTROPHILS NFR BLD AUTO: 71 %
NRBC # BLD AUTO: 0 10E3/UL
NRBC BLD AUTO-RTO: 0 /100
PLATELET # BLD AUTO: 152 10E3/UL (ref 150–450)
POTASSIUM SERPL-SCNC: 4.1 MMOL/L (ref 3.4–5.3)
PROT SERPL-MCNC: 6.8 G/DL (ref 6.4–8.3)
RBC # BLD AUTO: 4.86 10E6/UL (ref 4.4–5.9)
SODIUM SERPL-SCNC: 138 MMOL/L (ref 135–145)
WBC # BLD AUTO: 6.1 10E3/UL (ref 4–11)

## 2024-07-03 PROCEDURE — 36415 COLL VENOUS BLD VENIPUNCTURE: CPT | Mod: ZL

## 2024-07-03 PROCEDURE — 85025 COMPLETE CBC W/AUTO DIFF WBC: CPT | Mod: ZL

## 2024-07-03 PROCEDURE — 82248 BILIRUBIN DIRECT: CPT | Mod: ZL

## 2024-07-03 PROCEDURE — 80053 COMPREHEN METABOLIC PANEL: CPT | Mod: ZL

## 2024-10-04 ENCOUNTER — LAB (OUTPATIENT)
Dept: LAB | Facility: OTHER | Age: 74
End: 2024-10-04
Payer: MEDICARE

## 2024-10-04 ENCOUNTER — IMMUNIZATION (OUTPATIENT)
Dept: FAMILY MEDICINE | Facility: OTHER | Age: 74
End: 2024-10-04
Payer: MEDICARE

## 2024-10-04 VITALS — TEMPERATURE: 98.6 F

## 2024-10-04 DIAGNOSIS — Z79.899 NEED FOR PROPHYLACTIC CHEMOTHERAPY: ICD-10-CM

## 2024-10-04 DIAGNOSIS — Z23 ENCOUNTER FOR IMMUNIZATION: Primary | ICD-10-CM

## 2024-10-04 DIAGNOSIS — Z94.4 TRANSPLANTED LIVER (H): ICD-10-CM

## 2024-10-04 LAB
ALBUMIN SERPL BCG-MCNC: 4.3 G/DL (ref 3.5–5.2)
ALP SERPL-CCNC: 114 U/L (ref 40–150)
ALT SERPL W P-5'-P-CCNC: 30 U/L (ref 0–70)
ANION GAP SERPL CALCULATED.3IONS-SCNC: 12 MMOL/L (ref 7–15)
AST SERPL W P-5'-P-CCNC: 31 U/L (ref 0–45)
BASOPHILS # BLD AUTO: 0 10E3/UL (ref 0–0.2)
BASOPHILS NFR BLD AUTO: 0 %
BILIRUB DIRECT SERPL-MCNC: <0.2 MG/DL (ref 0–0.3)
BILIRUB SERPL-MCNC: 0.7 MG/DL
BUN SERPL-MCNC: 10.7 MG/DL (ref 8–23)
CALCIUM SERPL-MCNC: 9.1 MG/DL (ref 8.8–10.4)
CHLORIDE SERPL-SCNC: 104 MMOL/L (ref 98–107)
CREAT SERPL-MCNC: 1.21 MG/DL (ref 0.67–1.17)
EGFRCR SERPLBLD CKD-EPI 2021: 62 ML/MIN/1.73M2
EOSINOPHIL # BLD AUTO: 0.2 10E3/UL (ref 0–0.7)
EOSINOPHIL NFR BLD AUTO: 3 %
ERYTHROCYTE [DISTWIDTH] IN BLOOD BY AUTOMATED COUNT: 12.8 % (ref 10–15)
GLUCOSE SERPL-MCNC: 147 MG/DL (ref 70–99)
HCO3 SERPL-SCNC: 23 MMOL/L (ref 22–29)
HCT VFR BLD AUTO: 43 % (ref 40–53)
HGB BLD-MCNC: 14.6 G/DL (ref 13.3–17.7)
IMM GRANULOCYTES # BLD: 0 10E3/UL
IMM GRANULOCYTES NFR BLD: 0 %
LYMPHOCYTES # BLD AUTO: 1.1 10E3/UL (ref 0.8–5.3)
LYMPHOCYTES NFR BLD AUTO: 20 %
MCH RBC QN AUTO: 30 PG (ref 26.5–33)
MCHC RBC AUTO-ENTMCNC: 34 G/DL (ref 31.5–36.5)
MCV RBC AUTO: 88 FL (ref 78–100)
MONOCYTES # BLD AUTO: 0.5 10E3/UL (ref 0–1.3)
MONOCYTES NFR BLD AUTO: 10 %
NEUTROPHILS # BLD AUTO: 3.7 10E3/UL (ref 1.6–8.3)
NEUTROPHILS NFR BLD AUTO: 66 %
NRBC # BLD AUTO: 0 10E3/UL
NRBC BLD AUTO-RTO: 0 /100
PLATELET # BLD AUTO: 160 10E3/UL (ref 150–450)
POTASSIUM SERPL-SCNC: 4.1 MMOL/L (ref 3.4–5.3)
PROT SERPL-MCNC: 6.8 G/DL (ref 6.4–8.3)
RBC # BLD AUTO: 4.87 10E6/UL (ref 4.4–5.9)
SODIUM SERPL-SCNC: 139 MMOL/L (ref 135–145)
WBC # BLD AUTO: 5.5 10E3/UL (ref 4–11)

## 2024-10-04 PROCEDURE — 82248 BILIRUBIN DIRECT: CPT | Mod: ZL

## 2024-10-04 PROCEDURE — 80053 COMPREHEN METABOLIC PANEL: CPT | Mod: ZL

## 2024-10-04 PROCEDURE — G0008 ADMIN INFLUENZA VIRUS VAC: HCPCS

## 2024-10-04 PROCEDURE — 90480 ADMN SARSCOV2 VAC 1/ONLY CMP: CPT

## 2024-10-04 PROCEDURE — 36415 COLL VENOUS BLD VENIPUNCTURE: CPT | Mod: ZL

## 2024-10-04 PROCEDURE — 85004 AUTOMATED DIFF WBC COUNT: CPT | Mod: ZL

## 2024-10-04 NOTE — PROGRESS NOTES
Prior to immunization administration, verified patients identity using patient s name and date of birth. Please see Immunization Activity for additional information.     Is the patient's temperature normal (100.5 or less)? Yes     Patient MEETS CRITERIA. PROCEED with vaccine administration.      Patient instructed to remain in clinic for 15 minutes afterwards, and to report any adverse reactions.      Link to Ancillary Visit Immunization Standing Orders SmartSet     Screening performed by Max Choudhary RN on 10/4/2024 at 8:03 AM.

## 2024-11-08 ENCOUNTER — OFFICE VISIT (OUTPATIENT)
Dept: PEDIATRICS | Facility: OTHER | Age: 74
End: 2024-11-08
Attending: INTERNAL MEDICINE
Payer: MEDICARE

## 2024-11-08 VITALS
HEART RATE: 86 BPM | OXYGEN SATURATION: 97 % | DIASTOLIC BLOOD PRESSURE: 80 MMHG | RESPIRATION RATE: 16 BRPM | HEIGHT: 69 IN | TEMPERATURE: 97.7 F | SYSTOLIC BLOOD PRESSURE: 128 MMHG | BODY MASS INDEX: 28.85 KG/M2 | WEIGHT: 194.8 LBS

## 2024-11-08 DIAGNOSIS — F10.20 ALCOHOLISM (H): ICD-10-CM

## 2024-11-08 DIAGNOSIS — G62.9 POLYNEUROPATHY: ICD-10-CM

## 2024-11-08 DIAGNOSIS — E66.3 OVERWEIGHT: ICD-10-CM

## 2024-11-08 DIAGNOSIS — M10.9 GOUT, UNSPECIFIED CAUSE, UNSPECIFIED CHRONICITY, UNSPECIFIED SITE: ICD-10-CM

## 2024-11-08 DIAGNOSIS — E87.1 HYPONATREMIA: ICD-10-CM

## 2024-11-08 DIAGNOSIS — Z94.4 LIVER REPLACED BY TRANSPLANT (H): ICD-10-CM

## 2024-11-08 DIAGNOSIS — G62.1 ALCOHOL-INDUCED POLYNEUROPATHY (H): ICD-10-CM

## 2024-11-08 DIAGNOSIS — Z76.89 ENCOUNTER TO ESTABLISH CARE: Primary | ICD-10-CM

## 2024-11-08 DIAGNOSIS — M10.071 ACUTE IDIOPATHIC GOUT OF RIGHT FOOT: ICD-10-CM

## 2024-11-08 DIAGNOSIS — I85.10 ESOPHAGEAL VARICES IN CIRRHOSIS (H): ICD-10-CM

## 2024-11-08 DIAGNOSIS — E87.1 CHRONIC HYPONATREMIA: ICD-10-CM

## 2024-11-08 DIAGNOSIS — E78.5 DYSLIPIDEMIA: ICD-10-CM

## 2024-11-08 DIAGNOSIS — C22.0 HCC (HEPATOCELLULAR CARCINOMA) (H): ICD-10-CM

## 2024-11-08 DIAGNOSIS — Z87.19 PERSONAL HISTORY OF CIRRHOSIS: ICD-10-CM

## 2024-11-08 DIAGNOSIS — Z94.4 S/P LIVER TRANSPLANT (H): ICD-10-CM

## 2024-11-08 DIAGNOSIS — K70.30 ALCOHOLIC CIRRHOSIS OF LIVER WITHOUT ASCITES (H): ICD-10-CM

## 2024-11-08 DIAGNOSIS — N18.31 STAGE 3A CHRONIC KIDNEY DISEASE (H): ICD-10-CM

## 2024-11-08 DIAGNOSIS — R73.03 PREDIABETES: ICD-10-CM

## 2024-11-08 DIAGNOSIS — K74.60 ESOPHAGEAL VARICES IN CIRRHOSIS (H): ICD-10-CM

## 2024-11-08 DIAGNOSIS — G40.A01 EPILEPTIC PETIT MAL STATUS (H): ICD-10-CM

## 2024-11-08 DIAGNOSIS — M51.360 DEGENERATION OF INTERVERTEBRAL DISC OF LUMBAR REGION WITH DISCOGENIC BACK PAIN: ICD-10-CM

## 2024-11-08 PROBLEM — R73.01 IFG (IMPAIRED FASTING GLUCOSE): Status: RESOLVED | Noted: 2020-05-11 | Resolved: 2024-11-08

## 2024-11-08 PROBLEM — J10.1 INFLUENZA A: Status: RESOLVED | Noted: 2018-01-15 | Resolved: 2024-11-08

## 2024-11-08 PROBLEM — N17.9 ACUTE RENAL FAILURE (H): Status: RESOLVED | Noted: 2019-05-05 | Resolved: 2024-11-08

## 2024-11-08 LAB
CREAT UR-MCNC: 118.5 MG/DL
MICROALBUMIN UR-MCNC: 48.5 MG/L
MICROALBUMIN/CREAT UR: 40.93 MG/G CR (ref 0–17)

## 2024-11-08 PROCEDURE — 82043 UR ALBUMIN QUANTITATIVE: CPT | Mod: ZL | Performed by: INTERNAL MEDICINE

## 2024-11-08 PROCEDURE — 90677 PCV20 VACCINE IM: CPT

## 2024-11-08 PROCEDURE — G0463 HOSPITAL OUTPT CLINIC VISIT: HCPCS | Mod: 25

## 2024-11-08 RX ORDER — GABAPENTIN 300 MG/1
CAPSULE ORAL
Qty: 180 CAPSULE | Refills: 11 | Status: SHIPPED | OUTPATIENT
Start: 2024-11-08

## 2024-11-08 RX ORDER — ALLOPURINOL 100 MG/1
100 TABLET ORAL DAILY
Qty: 90 TABLET | Refills: 4 | Status: SHIPPED | OUTPATIENT
Start: 2024-11-08

## 2024-11-08 ASSESSMENT — PAIN SCALES - GENERAL: PAINLEVEL_OUTOF10: SEVERE PAIN (6)

## 2024-11-08 NOTE — NURSING NOTE
"Chief Complaint   Patient presents with    Back Pain     Low back pain from a fall 2019 fell on ice       Initial /80   Pulse 86   Temp 97.7  F (36.5  C) (Tympanic)   Resp 16   Ht 1.753 m (5' 9\")   Wt 88.4 kg (194 lb 12.8 oz)   SpO2 97%   BMI 28.77 kg/m   Estimated body mass index is 28.77 kg/m  as calculated from the following:    Height as of this encounter: 1.753 m (5' 9\").    Weight as of this encounter: 88.4 kg (194 lb 12.8 oz).  Medication Review: complete    The next two questions are to help us understand your food security.  If you are feeling you need any assistance in this area, we have resources available to support you today.          1/3/2024   SDOH- Food Insecurity   Within the past 12 months, did you worry that your food would run out before you got money to buy more? N    Within the past 12 months, did the food you bought just not last and you didn t have money to get more? N        Patient-reported         Health Care Directive:  Patient has a Health Care Directive on file      Norma J. Gosselin, LPN      "

## 2024-11-08 NOTE — PROGRESS NOTES
Assessment & Plan   1. Encounter to establish care (Primary)    2. Gout, unspecified cause, unspecified chronicity, unspecified site  3. Acute idiopathic gout of right foot  At goal, no change.  - allopurinol (ZYLOPRIM) 100 MG tablet; Take 1 tablet (100 mg) by mouth daily.  Dispense: 90 tablet; Refill: 4    4. Degeneration of intervertebral disc of lumbar region with discogenic back pain  Overall his back symptoms do not sound majorly changed.  Recommend chandler chi and yoga.  Consider return to PT OT if symptoms persist or worsen.    5. S/P liver transplant (H)  6. HCC (hepatocellular carcinoma) (H)  7. Alcoholic cirrhosis of liver without ascites (H)  He follows at the [facility]    8. Dyslipidemia  He wants his statin filled at the [facility]    9. Polyneuropathy  At goal, no change.  - gabapentin (NEURONTIN) 300 MG capsule; TAKE 1 CAPSULE(300 MG) BY MOUTH TWICE DAILY  Dispense: 180 capsule; Refill: 11    10. Alcohol-induced polyneuropathy (H)  11. Esophageal varices in cirrhosis (H)    12. Epileptic petit mal status (H)  13. Chronic hyponatremia  14. Hyponatremia  He reports that his seizures were secondary to significant hyponatremia which has resolved.    15. Personal history of cirrhosis  16. Liver replaced by transplant due to alcoholic cirrhosis and HCC  17. Alcoholism (H), in remission    18. Prediabetes  Recent A1c has been within normal limits.    19. Stage 3a chronic kidney disease (H)  Due to check microalbumin  - Albumin Random Urine Quantitative with Creat Ratio; Future    20. Overweight      Patient Instructions    -- Consider trying chandler chi or yoga (at the [facility])    Find Local Classes   * Preventing falls   * Preventing and managing diabetes   * Managing chronic conditions and pain   * Mental Health    Examples of classes:   -- Diabetes prevention program (Pre-Diabetes or at risk for diabetes)   -- Living well with Diabetes   -- Living well with Chronic Conditions   -- Living well with Chronic Pain    "-- Stay Active and Independent for Life (SAIL)   -- Parminder Nolan lAmeida: Moving for better balance   -- Older Adult Mental Health First Aid   -- Walk with ease free walking program    How do I sign up?  Stop by the [facility] check-out desk for a Arianne referral  Call Elder [place] [phone]  Contact Giannisaleem directly via: [email or web address] or [phone]        Return in about 1 year (around 11/8/2025), or if symptoms worsen or fail to improve, for med management.    Signed, Herbert Pichardo MD, FAAP, FACP  Internal Medicine & Pediatrics    Subjective   Ramo Lockwood is a 74 year old male who presents for Back Pain (Low back pain from a fall 2019 fell on ice).  He tells me he is really here to establish primary care.  Previous primary care physician was Dr. Díaz.  His back is not really bothering him in any more or less than it usually does.  He had a liver transplant at [facility] in 2019 for cirrhosis and hepatocellular carcinoma.  He reports doing very well since then.  He follows closely with [facility] for lab work and surveillance.    Objective   Vitals: /80   Pulse 86   Temp 97.7  F (36.5  C) (Tympanic)   Resp 16   Ht 1.753 m (5' 9\")   Wt 88.4 kg (194 lb 12.8 oz)   SpO2 97%   BMI 28.77 kg/m        Review and Analysis of Data   I personally reviewed the following:  External notes: Yes [facility]  Results: Yes local and remote lab  Use of an independent historian: No  Independent review of a test performed by another physician: No  Discussion of management with another physician: No  Moderate risk of morbidity from additional diagnostic testing and/or treatment.    "

## 2024-11-08 NOTE — PATIENT INSTRUCTIONS
-- Consider trying chandler chi or yoga (at the [facility])    Find Local Classes   * Preventing falls   * Preventing and managing diabetes   * Managing chronic conditions and pain   * Mental Health    Examples of classes:   -- Diabetes prevention program (Pre-Diabetes or at risk for diabetes)   -- Living well with Diabetes   -- Living well with Chronic Conditions   -- Living well with Chronic Pain   -- Stay Active and Independent for Life (SAIL)   -- Chandler Ji Juan [place]: Moving for better balance   -- Older Adult Mental Health First Aid   -- Walk with ease free walking program    How do I sign up?  Stop by the [facility] check-out desk for a Arianne referral  Call Elder [place] [phone]  Contact Arianne directly via: [email or web address] or [phone]

## 2024-11-23 ENCOUNTER — HEALTH MAINTENANCE LETTER (OUTPATIENT)
Age: 74
End: 2024-11-23

## 2025-01-10 ENCOUNTER — LAB (OUTPATIENT)
Dept: LAB | Facility: OTHER | Age: 75
End: 2025-01-10
Payer: MEDICARE

## 2025-01-10 DIAGNOSIS — Z94.4 TRANSPLANTED LIVER (H): ICD-10-CM

## 2025-01-10 DIAGNOSIS — Z79.899 NEED FOR PROPHYLACTIC CHEMOTHERAPY: ICD-10-CM

## 2025-01-10 LAB
ALBUMIN SERPL BCG-MCNC: 4.2 G/DL (ref 3.5–5.2)
ALP SERPL-CCNC: 117 U/L (ref 40–150)
ALT SERPL W P-5'-P-CCNC: 32 U/L (ref 0–70)
ANION GAP SERPL CALCULATED.3IONS-SCNC: 11 MMOL/L (ref 7–15)
AST SERPL W P-5'-P-CCNC: 29 U/L (ref 0–45)
BASOPHILS # BLD AUTO: 0 10E3/UL (ref 0–0.2)
BASOPHILS NFR BLD AUTO: 0 %
BILIRUB DIRECT SERPL-MCNC: <0.2 MG/DL (ref 0–0.3)
BILIRUB SERPL-MCNC: 0.7 MG/DL
BUN SERPL-MCNC: 10.9 MG/DL (ref 8–23)
CALCIUM SERPL-MCNC: 9 MG/DL (ref 8.8–10.4)
CHLORIDE SERPL-SCNC: 106 MMOL/L (ref 98–107)
CREAT SERPL-MCNC: 1.28 MG/DL (ref 0.67–1.17)
EGFRCR SERPLBLD CKD-EPI 2021: 58 ML/MIN/1.73M2
EOSINOPHIL # BLD AUTO: 0.1 10E3/UL (ref 0–0.7)
EOSINOPHIL NFR BLD AUTO: 2 %
ERYTHROCYTE [DISTWIDTH] IN BLOOD BY AUTOMATED COUNT: 13.3 % (ref 10–15)
GLUCOSE SERPL-MCNC: 126 MG/DL (ref 70–99)
HCO3 SERPL-SCNC: 23 MMOL/L (ref 22–29)
HCT VFR BLD AUTO: 42.7 % (ref 40–53)
HGB BLD-MCNC: 14.5 G/DL (ref 13.3–17.7)
IMM GRANULOCYTES # BLD: 0 10E3/UL
IMM GRANULOCYTES NFR BLD: 0 %
LYMPHOCYTES # BLD AUTO: 1.1 10E3/UL (ref 0.8–5.3)
LYMPHOCYTES NFR BLD AUTO: 19 %
MCH RBC QN AUTO: 30.1 PG (ref 26.5–33)
MCHC RBC AUTO-ENTMCNC: 34 G/DL (ref 31.5–36.5)
MCV RBC AUTO: 89 FL (ref 78–100)
MONOCYTES # BLD AUTO: 0.5 10E3/UL (ref 0–1.3)
MONOCYTES NFR BLD AUTO: 8 %
NEUTROPHILS # BLD AUTO: 3.9 10E3/UL (ref 1.6–8.3)
NEUTROPHILS NFR BLD AUTO: 69 %
NRBC # BLD AUTO: 0 10E3/UL
NRBC BLD AUTO-RTO: 0 /100
PLATELET # BLD AUTO: 163 10E3/UL (ref 150–450)
POTASSIUM SERPL-SCNC: 4.3 MMOL/L (ref 3.4–5.3)
PROT SERPL-MCNC: 6.7 G/DL (ref 6.4–8.3)
RBC # BLD AUTO: 4.81 10E6/UL (ref 4.4–5.9)
SODIUM SERPL-SCNC: 140 MMOL/L (ref 135–145)
WBC # BLD AUTO: 5.6 10E3/UL (ref 4–11)

## 2025-01-10 PROCEDURE — 82435 ASSAY OF BLOOD CHLORIDE: CPT | Mod: ZL

## 2025-01-10 PROCEDURE — 85004 AUTOMATED DIFF WBC COUNT: CPT | Mod: ZL

## 2025-01-10 PROCEDURE — 82040 ASSAY OF SERUM ALBUMIN: CPT | Mod: ZL

## 2025-01-10 PROCEDURE — 85014 HEMATOCRIT: CPT | Mod: ZL

## 2025-01-10 PROCEDURE — 80053 COMPREHEN METABOLIC PANEL: CPT | Mod: ZL

## 2025-01-10 PROCEDURE — 82248 BILIRUBIN DIRECT: CPT | Mod: ZL

## 2025-01-10 PROCEDURE — 36415 COLL VENOUS BLD VENIPUNCTURE: CPT | Mod: ZL

## 2025-01-26 DIAGNOSIS — G62.9 POLYNEUROPATHY: ICD-10-CM

## 2025-01-28 RX ORDER — GABAPENTIN 300 MG/1
CAPSULE ORAL
Qty: 180 CAPSULE | Refills: 11 | OUTPATIENT
Start: 2025-01-28

## 2025-01-28 NOTE — TELEPHONE ENCOUNTER
[facility] sent Rx request for the following:      Requested Prescriptions   Refused Prescriptions Disp Refills    gabapentin (NEURONTIN) 300 MG capsule [Pharmacy Med Name: GABAPENTIN 300MG CAPSULES] 180 capsule 11     Sig: TAKE 1 CAPSULE(300 MG) BY MOUTH TWICE DAILY     Last Prescription Date:   11/08/24  Last Fill Qty/Refills:         180, R-11    Last Office Visit:              11/08/24 ([place])   Future Office visit:            None    Unable to complete prescription refill per RN Medication Refill Policy.   Refills on file until 11/08/25.  Dodie Brink RN on 1/28/2025 at 8:49 AM

## 2025-02-09 ENCOUNTER — HEALTH MAINTENANCE LETTER (OUTPATIENT)
Age: 75
End: 2025-02-09

## 2025-03-30 ENCOUNTER — HEALTH MAINTENANCE LETTER (OUTPATIENT)
Age: 75
End: 2025-03-30

## 2025-07-08 ENCOUNTER — LAB (OUTPATIENT)
Dept: LAB | Facility: OTHER | Age: 75
End: 2025-07-08
Payer: MEDICARE

## 2025-07-08 DIAGNOSIS — Z01.89 EXAMINATION FOR, LABORATORY: Primary | ICD-10-CM

## 2025-07-08 PROCEDURE — 99000 SPECIMEN HANDLING OFFICE-LAB: CPT

## 2025-07-08 PROCEDURE — 36415 COLL VENOUS BLD VENIPUNCTURE: CPT | Mod: ZL

## (undated) DEVICE — DRAPE STERI TOWEL LG 1010

## (undated) DEVICE — DRAPE MAYO STAND 23X54 8337

## (undated) DEVICE — DRSG GAUZE 4X4" TRAY 6939

## (undated) DEVICE — BLADE CARPAL TUNNEL ENDO 81010-1

## (undated) DEVICE — PREP CHLORAPREP 26ML TINTED ORANGE  260815

## (undated) DEVICE — COVER LIGHT HANDLE LT-F02

## (undated) DEVICE — GLOVE PROTEXIS POWDER FREE SMT 8.5 2D72PT85X

## (undated) DEVICE — GLOVE SENSICARE 8.5 MSG1085 LATEX FREE

## (undated) DEVICE — BNDG ELASTIC 2"X5YDS UNSTERILE 6611-20

## (undated) DEVICE — PACK MAJOR EXTREMITY SOP15MEFCA

## (undated) DEVICE — CAST PADDING-STERILE 2"

## (undated) DEVICE — DRAPE EXTREMITY W/ARMBOARD 29405

## (undated) DEVICE — TOURNIQUET SGL BLADDER 18"X4" RED 5921-218-135

## (undated) DEVICE — SU ETHILON 4-0 FS-2 18" 662H

## (undated) DEVICE — DRSG XEROFORM 1X8"

## (undated) RX ORDER — ONDANSETRON 2 MG/ML
INJECTION INTRAMUSCULAR; INTRAVENOUS
Status: DISPENSED
Start: 2023-01-13

## (undated) RX ORDER — LIDOCAINE HYDROCHLORIDE 10 MG/ML
INJECTION, SOLUTION EPIDURAL; INFILTRATION; INTRACAUDAL; PERINEURAL
Status: DISPENSED
Start: 2023-01-13

## (undated) RX ORDER — PROPOFOL 10 MG/ML
INJECTION, EMULSION INTRAVENOUS
Status: DISPENSED
Start: 2023-01-13

## (undated) RX ORDER — FENTANYL CITRATE 50 UG/ML
INJECTION, SOLUTION INTRAMUSCULAR; INTRAVENOUS
Status: DISPENSED
Start: 2023-01-13

## (undated) RX ORDER — BUPIVACAINE HYDROCHLORIDE 2.5 MG/ML
INJECTION, SOLUTION EPIDURAL; INFILTRATION; INTRACAUDAL
Status: DISPENSED
Start: 2023-01-13